# Patient Record
Sex: MALE | Race: OTHER | NOT HISPANIC OR LATINO | URBAN - METROPOLITAN AREA
[De-identification: names, ages, dates, MRNs, and addresses within clinical notes are randomized per-mention and may not be internally consistent; named-entity substitution may affect disease eponyms.]

---

## 2020-01-09 ENCOUNTER — INPATIENT (INPATIENT)
Facility: HOSPITAL | Age: 75
LOS: 11 days | Discharge: HOME | DRG: 870 | End: 2020-01-21
Attending: STUDENT IN AN ORGANIZED HEALTH CARE EDUCATION/TRAINING PROGRAM | Admitting: STUDENT IN AN ORGANIZED HEALTH CARE EDUCATION/TRAINING PROGRAM
Payer: SELF-PAY

## 2020-01-09 VITALS
SYSTOLIC BLOOD PRESSURE: 240 MMHG | TEMPERATURE: 97 F | OXYGEN SATURATION: 89 % | HEART RATE: 79 BPM | DIASTOLIC BLOOD PRESSURE: 140 MMHG | WEIGHT: 119.93 LBS | HEIGHT: 64 IN | RESPIRATION RATE: 36 BRPM

## 2020-01-09 DIAGNOSIS — R56.9 UNSPECIFIED CONVULSIONS: ICD-10-CM

## 2020-01-09 LAB
ALBUMIN SERPL ELPH-MCNC: 3.8 G/DL — SIGNIFICANT CHANGE UP (ref 3.4–5)
ALP SERPL-CCNC: 69 U/L — SIGNIFICANT CHANGE UP (ref 40–120)
ALT FLD-CCNC: 31 U/L — SIGNIFICANT CHANGE UP (ref 12–42)
ANION GAP SERPL CALC-SCNC: 17 MMOL/L — SIGNIFICANT CHANGE UP (ref 5–17)
ANION GAP SERPL CALC-SCNC: 17 MMOL/L — SIGNIFICANT CHANGE UP (ref 5–17)
ANION GAP SERPL CALC-SCNC: 6 MMOL/L — LOW (ref 9–16)
APPEARANCE UR: CLEAR — SIGNIFICANT CHANGE UP
APTT BLD: 32 SEC — SIGNIFICANT CHANGE UP (ref 27.5–36.3)
AST SERPL-CCNC: 55 U/L — HIGH (ref 15–37)
BACTERIA # UR AUTO: PRESENT /HPF
BASOPHILS # BLD AUTO: 0.03 K/UL — SIGNIFICANT CHANGE UP (ref 0–0.2)
BASOPHILS NFR BLD AUTO: 0.3 % — SIGNIFICANT CHANGE UP (ref 0–2)
BILIRUB SERPL-MCNC: 0.5 MG/DL — SIGNIFICANT CHANGE UP (ref 0.2–1.2)
BILIRUB UR-MCNC: NEGATIVE — SIGNIFICANT CHANGE UP
BUN SERPL-MCNC: 8 MG/DL — SIGNIFICANT CHANGE UP (ref 7–23)
BUN SERPL-MCNC: 9 MG/DL — SIGNIFICANT CHANGE UP (ref 7–23)
BUN SERPL-MCNC: 9 MG/DL — SIGNIFICANT CHANGE UP (ref 7–23)
CALCIUM SERPL-MCNC: 7.9 MG/DL — LOW (ref 8.4–10.5)
CALCIUM SERPL-MCNC: 8.2 MG/DL — LOW (ref 8.5–10.5)
CALCIUM SERPL-MCNC: 8.6 MG/DL — SIGNIFICANT CHANGE UP (ref 8.4–10.5)
CHLORIDE SERPL-SCNC: 91 MMOL/L — LOW (ref 96–108)
CHLORIDE SERPL-SCNC: 92 MMOL/L — LOW (ref 96–108)
CHLORIDE SERPL-SCNC: 93 MMOL/L — LOW (ref 96–108)
CK MB BLD-MCNC: 2.69 % — SIGNIFICANT CHANGE UP
CK MB CFR SERPL CALC: 8.8 NG/ML — HIGH (ref 0.5–3.6)
CK SERPL-CCNC: 327 U/L — HIGH (ref 39–308)
CO2 SERPL-SCNC: 19 MMOL/L — LOW (ref 22–31)
CO2 SERPL-SCNC: 20 MMOL/L — LOW (ref 22–31)
CO2 SERPL-SCNC: 29 MMOL/L — SIGNIFICANT CHANGE UP (ref 22–31)
COLOR SPEC: YELLOW — SIGNIFICANT CHANGE UP
CREAT SERPL-MCNC: 0.6 MG/DL — SIGNIFICANT CHANGE UP (ref 0.5–1.3)
CREAT SERPL-MCNC: 0.76 MG/DL — SIGNIFICANT CHANGE UP (ref 0.5–1.3)
CREAT SERPL-MCNC: 0.81 MG/DL — SIGNIFICANT CHANGE UP (ref 0.5–1.3)
DIFF PNL FLD: ABNORMAL
EOSINOPHIL # BLD AUTO: 0 K/UL — SIGNIFICANT CHANGE UP (ref 0–0.5)
EOSINOPHIL NFR BLD AUTO: 0 % — SIGNIFICANT CHANGE UP (ref 0–6)
FLU A RESULT: SIGNIFICANT CHANGE UP
FLU A RESULT: SIGNIFICANT CHANGE UP
FLUAV AG NPH QL: SIGNIFICANT CHANGE UP
FLUBV AG NPH QL: SIGNIFICANT CHANGE UP
GAS PNL BLDA: SIGNIFICANT CHANGE UP
GAS PNL BLDV: SIGNIFICANT CHANGE UP
GLUCOSE BLDC GLUCOMTR-MCNC: 198 MG/DL — HIGH (ref 70–99)
GLUCOSE SERPL-MCNC: 127 MG/DL — HIGH (ref 70–99)
GLUCOSE SERPL-MCNC: 198 MG/DL — HIGH (ref 70–99)
GLUCOSE SERPL-MCNC: 201 MG/DL — HIGH (ref 70–99)
GLUCOSE UR QL: 500
HCT VFR BLD CALC: 45.8 % — SIGNIFICANT CHANGE UP (ref 39–50)
HGB BLD-MCNC: 15 G/DL — SIGNIFICANT CHANGE UP (ref 13–17)
IMM GRANULOCYTES NFR BLD AUTO: 0.3 % — SIGNIFICANT CHANGE UP (ref 0–1.5)
INR BLD: 1.16 — SIGNIFICANT CHANGE UP (ref 0.88–1.16)
KETONES UR-MCNC: NEGATIVE — SIGNIFICANT CHANGE UP
LACTATE SERPL-SCNC: 1.6 MMOL/L — SIGNIFICANT CHANGE UP (ref 0.4–2)
LACTATE SERPL-SCNC: 5.6 MMOL/L — CRITICAL HIGH (ref 0.5–2)
LEUKOCYTE ESTERASE UR-ACNC: NEGATIVE — SIGNIFICANT CHANGE UP
LYMPHOCYTES # BLD AUTO: 0.58 K/UL — LOW (ref 1–3.3)
LYMPHOCYTES # BLD AUTO: 5.7 % — LOW (ref 13–44)
MCHC RBC-ENTMCNC: 29.8 PG — SIGNIFICANT CHANGE UP (ref 27–34)
MCHC RBC-ENTMCNC: 32.8 GM/DL — SIGNIFICANT CHANGE UP (ref 32–36)
MCV RBC AUTO: 91.1 FL — SIGNIFICANT CHANGE UP (ref 80–100)
MONOCYTES # BLD AUTO: 1.03 K/UL — HIGH (ref 0–0.9)
MONOCYTES NFR BLD AUTO: 10.1 % — SIGNIFICANT CHANGE UP (ref 2–14)
NEUTROPHILS # BLD AUTO: 8.5 K/UL — HIGH (ref 1.8–7.4)
NEUTROPHILS NFR BLD AUTO: 83.6 % — HIGH (ref 43–77)
NITRITE UR-MCNC: NEGATIVE — SIGNIFICANT CHANGE UP
NRBC # BLD: 0 /100 WBCS — SIGNIFICANT CHANGE UP (ref 0–0)
NT-PROBNP SERPL-SCNC: 486 PG/ML — HIGH
PCO2 BLDA: 73 MMHG — CRITICAL HIGH (ref 35–48)
PH BLDA: 7.26 — LOW (ref 7.35–7.45)
PH UR: 6.5 — SIGNIFICANT CHANGE UP (ref 5–8)
PLATELET # BLD AUTO: 171 K/UL — SIGNIFICANT CHANGE UP (ref 150–400)
PO2 BLDA: 269 MMHG — HIGH (ref 83–108)
POTASSIUM SERPL-MCNC: 3.9 MMOL/L — SIGNIFICANT CHANGE UP (ref 3.5–5.3)
POTASSIUM SERPL-MCNC: 5 MMOL/L — SIGNIFICANT CHANGE UP (ref 3.5–5.3)
POTASSIUM SERPL-MCNC: SIGNIFICANT CHANGE UP MMOL/L (ref 3.5–5.3)
POTASSIUM SERPL-SCNC: 3.9 MMOL/L — SIGNIFICANT CHANGE UP (ref 3.5–5.3)
POTASSIUM SERPL-SCNC: 5 MMOL/L — SIGNIFICANT CHANGE UP (ref 3.5–5.3)
POTASSIUM SERPL-SCNC: SIGNIFICANT CHANGE UP MMOL/L (ref 3.5–5.3)
PROT SERPL-MCNC: 8.4 G/DL — HIGH (ref 6.4–8.2)
PROT UR-MCNC: 30 MG/DL
PROTHROM AB SERPL-ACNC: 12.8 SEC — SIGNIFICANT CHANGE UP (ref 10–12.9)
RBC # BLD: 5.03 M/UL — SIGNIFICANT CHANGE UP (ref 4.2–5.8)
RBC # FLD: 13.6 % — SIGNIFICANT CHANGE UP (ref 10.3–14.5)
RBC CASTS # UR COMP ASSIST: > 10 /HPF
RSV RESULT: DETECTED
RSV RNA RESP QL NAA+PROBE: DETECTED
SAO2 % BLDA: 99 % — SIGNIFICANT CHANGE UP (ref 95–100)
SODIUM SERPL-SCNC: 126 MMOL/L — LOW (ref 132–145)
SODIUM SERPL-SCNC: 128 MMOL/L — LOW (ref 135–145)
SODIUM SERPL-SCNC: 130 MMOL/L — LOW (ref 135–145)
SP GR SPEC: 1.02 — SIGNIFICANT CHANGE UP (ref 1–1.03)
TROPONIN I SERPL-MCNC: <0.017 NG/ML — LOW (ref 0.02–0.06)
UROBILINOGEN FLD QL: 0.2 E.U./DL — SIGNIFICANT CHANGE UP
WBC # BLD: 10.17 K/UL — SIGNIFICANT CHANGE UP (ref 3.8–10.5)
WBC # FLD AUTO: 10.17 K/UL — SIGNIFICANT CHANGE UP (ref 3.8–10.5)
WBC UR QL: < 5 /HPF — SIGNIFICANT CHANGE UP

## 2020-01-09 PROCEDURE — 99291 CRITICAL CARE FIRST HOUR: CPT | Mod: 25

## 2020-01-09 PROCEDURE — 71045 X-RAY EXAM CHEST 1 VIEW: CPT | Mod: 26,77

## 2020-01-09 PROCEDURE — 93010 ELECTROCARDIOGRAM REPORT: CPT | Mod: 59

## 2020-01-09 PROCEDURE — 71045 X-RAY EXAM CHEST 1 VIEW: CPT | Mod: 26

## 2020-01-09 PROCEDURE — 99291 CRITICAL CARE FIRST HOUR: CPT

## 2020-01-09 PROCEDURE — 99292 CRITICAL CARE ADDL 30 MIN: CPT | Mod: 25

## 2020-01-09 PROCEDURE — 31500 INSERT EMERGENCY AIRWAY: CPT

## 2020-01-09 RX ORDER — PROPOFOL 10 MG/ML
50 INJECTION, EMULSION INTRAVENOUS ONCE
Refills: 0 | Status: COMPLETED | OUTPATIENT
Start: 2020-01-09 | End: 2020-01-09

## 2020-01-09 RX ORDER — PROPOFOL 10 MG/ML
5 INJECTION, EMULSION INTRAVENOUS
Qty: 1000 | Refills: 0 | Status: DISCONTINUED | OUTPATIENT
Start: 2020-01-09 | End: 2020-01-10

## 2020-01-09 RX ORDER — SODIUM CHLORIDE 9 MG/ML
1700 INJECTION INTRAMUSCULAR; INTRAVENOUS; SUBCUTANEOUS ONCE
Refills: 0 | Status: COMPLETED | OUTPATIENT
Start: 2020-01-09 | End: 2020-01-09

## 2020-01-09 RX ORDER — CHLORHEXIDINE GLUCONATE 213 G/1000ML
15 SOLUTION TOPICAL EVERY 12 HOURS
Refills: 0 | Status: DISCONTINUED | OUTPATIENT
Start: 2020-01-09 | End: 2020-01-12

## 2020-01-09 RX ORDER — SUCCINYLCHOLINE CHLORIDE 100 MG/5ML
80 SYRINGE (ML) INTRAVENOUS ONCE
Refills: 0 | Status: COMPLETED | OUTPATIENT
Start: 2020-01-09 | End: 2020-01-09

## 2020-01-09 RX ORDER — IPRATROPIUM/ALBUTEROL SULFATE 18-103MCG
3 AEROSOL WITH ADAPTER (GRAM) INHALATION EVERY 4 HOURS
Refills: 0 | Status: DISCONTINUED | OUTPATIENT
Start: 2020-01-09 | End: 2020-01-21

## 2020-01-09 RX ORDER — SODIUM CHLORIDE 9 MG/ML
1000 INJECTION INTRAMUSCULAR; INTRAVENOUS; SUBCUTANEOUS
Refills: 0 | Status: DISCONTINUED | OUTPATIENT
Start: 2020-01-09 | End: 2020-01-10

## 2020-01-09 RX ORDER — CHLORHEXIDINE GLUCONATE 213 G/1000ML
1 SOLUTION TOPICAL
Refills: 0 | Status: DISCONTINUED | OUTPATIENT
Start: 2020-01-09 | End: 2020-01-09

## 2020-01-09 RX ORDER — ETOMIDATE 2 MG/ML
20 INJECTION INTRAVENOUS ONCE
Refills: 0 | Status: COMPLETED | OUTPATIENT
Start: 2020-01-09 | End: 2020-01-09

## 2020-01-09 RX ORDER — SODIUM CHLORIDE 9 MG/ML
1000 INJECTION, SOLUTION INTRAVENOUS
Refills: 0 | Status: DISCONTINUED | OUTPATIENT
Start: 2020-01-09 | End: 2020-01-10

## 2020-01-09 RX ORDER — MIDAZOLAM HYDROCHLORIDE 1 MG/ML
0.02 INJECTION, SOLUTION INTRAMUSCULAR; INTRAVENOUS
Qty: 100 | Refills: 0 | Status: DISCONTINUED | OUTPATIENT
Start: 2020-01-09 | End: 2020-01-10

## 2020-01-09 RX ORDER — HEPARIN SODIUM 5000 [USP'U]/ML
5000 INJECTION INTRAVENOUS; SUBCUTANEOUS EVERY 12 HOURS
Refills: 0 | Status: DISCONTINUED | OUTPATIENT
Start: 2020-01-09 | End: 2020-01-10

## 2020-01-09 RX ORDER — FENTANYL CITRATE 50 UG/ML
50 INJECTION INTRAVENOUS ONCE
Refills: 0 | Status: DISCONTINUED | OUTPATIENT
Start: 2020-01-09 | End: 2020-01-09

## 2020-01-09 RX ORDER — CHLORHEXIDINE GLUCONATE 213 G/1000ML
1 SOLUTION TOPICAL DAILY
Refills: 0 | Status: DISCONTINUED | OUTPATIENT
Start: 2020-01-09 | End: 2020-01-16

## 2020-01-09 RX ORDER — MIDAZOLAM HYDROCHLORIDE 1 MG/ML
2 INJECTION, SOLUTION INTRAMUSCULAR; INTRAVENOUS ONCE
Refills: 0 | Status: DISCONTINUED | OUTPATIENT
Start: 2020-01-09 | End: 2020-01-09

## 2020-01-09 RX ORDER — FENTANYL CITRATE 50 UG/ML
1 INJECTION INTRAVENOUS
Qty: 2500 | Refills: 0 | Status: DISCONTINUED | OUTPATIENT
Start: 2020-01-09 | End: 2020-01-09

## 2020-01-09 RX ORDER — CEFTRIAXONE 500 MG/1
1000 INJECTION, POWDER, FOR SOLUTION INTRAMUSCULAR; INTRAVENOUS ONCE
Refills: 0 | Status: COMPLETED | OUTPATIENT
Start: 2020-01-09 | End: 2020-01-09

## 2020-01-09 RX ORDER — AZITHROMYCIN 500 MG/1
500 TABLET, FILM COATED ORAL ONCE
Refills: 0 | Status: COMPLETED | OUTPATIENT
Start: 2020-01-09 | End: 2020-01-09

## 2020-01-09 RX ORDER — MAGNESIUM SULFATE 500 MG/ML
2 VIAL (ML) INJECTION ONCE
Refills: 0 | Status: COMPLETED | OUTPATIENT
Start: 2020-01-09 | End: 2020-01-09

## 2020-01-09 RX ORDER — IPRATROPIUM/ALBUTEROL SULFATE 18-103MCG
3 AEROSOL WITH ADAPTER (GRAM) INHALATION
Refills: 0 | Status: COMPLETED | OUTPATIENT
Start: 2020-01-09 | End: 2020-01-09

## 2020-01-09 RX ORDER — AZITHROMYCIN 500 MG/1
500 TABLET, FILM COATED ORAL EVERY 24 HOURS
Refills: 0 | Status: DISCONTINUED | OUTPATIENT
Start: 2020-01-10 | End: 2020-01-14

## 2020-01-09 RX ADMIN — AZITHROMYCIN 255 MILLIGRAM(S): 500 TABLET, FILM COATED ORAL at 13:39

## 2020-01-09 RX ADMIN — PROPOFOL 50 MILLIGRAM(S): 10 INJECTION, EMULSION INTRAVENOUS at 13:30

## 2020-01-09 RX ADMIN — Medication 80 MILLIGRAM(S): at 13:01

## 2020-01-09 RX ADMIN — CEFTRIAXONE 100 MILLIGRAM(S): 500 INJECTION, POWDER, FOR SOLUTION INTRAMUSCULAR; INTRAVENOUS at 12:41

## 2020-01-09 RX ADMIN — PROPOFOL 50 MILLIGRAM(S): 10 INJECTION, EMULSION INTRAVENOUS at 13:13

## 2020-01-09 RX ADMIN — Medication 3 MILLILITER(S): at 12:40

## 2020-01-09 RX ADMIN — FENTANYL CITRATE 5.44 MICROGRAM(S)/KG/HR: 50 INJECTION INTRAVENOUS at 13:56

## 2020-01-09 RX ADMIN — MIDAZOLAM HYDROCHLORIDE 2 MILLIGRAM(S): 1 INJECTION, SOLUTION INTRAMUSCULAR; INTRAVENOUS at 21:32

## 2020-01-09 RX ADMIN — MIDAZOLAM HYDROCHLORIDE 1 MG/KG/HR: 1 INJECTION, SOLUTION INTRAMUSCULAR; INTRAVENOUS at 23:21

## 2020-01-09 RX ADMIN — Medication 50 GRAM(S): at 13:39

## 2020-01-09 RX ADMIN — FENTANYL CITRATE 50 MICROGRAM(S): 50 INJECTION INTRAVENOUS at 13:06

## 2020-01-09 RX ADMIN — SODIUM CHLORIDE 1700 MILLILITER(S): 9 INJECTION INTRAMUSCULAR; INTRAVENOUS; SUBCUTANEOUS at 12:40

## 2020-01-09 RX ADMIN — PROPOFOL 1.63 MICROGRAM(S)/KG/MIN: 10 INJECTION, EMULSION INTRAVENOUS at 18:40

## 2020-01-09 RX ADMIN — Medication 3 MILLILITER(S): at 12:59

## 2020-01-09 RX ADMIN — Medication 125 MILLIGRAM(S): at 12:51

## 2020-01-09 RX ADMIN — Medication 3 MILLILITER(S): at 20:25

## 2020-01-09 RX ADMIN — PROPOFOL 50 MILLIGRAM(S): 10 INJECTION, EMULSION INTRAVENOUS at 15:30

## 2020-01-09 RX ADMIN — SODIUM CHLORIDE 80 MILLILITER(S): 9 INJECTION INTRAMUSCULAR; INTRAVENOUS; SUBCUTANEOUS at 18:40

## 2020-01-09 RX ADMIN — PROPOFOL 1.63 MICROGRAM(S)/KG/MIN: 10 INJECTION, EMULSION INTRAVENOUS at 13:57

## 2020-01-09 RX ADMIN — Medication 3 MILLILITER(S): at 12:50

## 2020-01-09 RX ADMIN — PROPOFOL 50 MILLIGRAM(S): 10 INJECTION, EMULSION INTRAVENOUS at 15:50

## 2020-01-09 RX ADMIN — ETOMIDATE 20 MILLIGRAM(S): 2 INJECTION INTRAVENOUS at 13:00

## 2020-01-09 RX ADMIN — SODIUM CHLORIDE 2000 MILLILITER(S): 9 INJECTION, SOLUTION INTRAVENOUS at 22:45

## 2020-01-09 NOTE — ED PROVIDER NOTE - IMAGING STUDIES QUESTION 2 - PERFORMED INDEPENDENT VISUALIZATION
UTI precautions:  Wipe front to back and avoid constipation.  Avoid caffeine.  Drink 1 gallon of water daily  Void every 3-4 hrs  Expel urine from vagina post void  Void soon after urge arises  No dryer sheets or harsh detergents with the undergarments  No bubble baths  Void before and after intercourse  Avoid hot tub use  Avoid tight fitting clothes and panty hose  Cranberry supplement w/ 36 mg of PAC-helps to block bacteria from attaching to the bacteria wall  Probiotic- [employer] Ultra 25 Billion CFU Probiotic Complex, Multi Strain.  The Multi Strain is specifically the one that is important as the greater variety of strains is better.     Yes

## 2020-01-09 NOTE — H&P ADULT - ATTENDING COMMENTS
Sedation increased  and I time decreased and RR decreased in order to increase E time with evidence of air trapping. continue steroids and bronchodilators.

## 2020-01-09 NOTE — ED PROVIDER NOTE - CLINICAL SUMMARY MEDICAL DECISION MAKING FREE TEXT BOX
hypercapnic respiratory failure due to RSV pneumonia, trial of BIPAP with improvement in hypoxia but not ventilation, requiring intubation for impending respiratory failure, labs noted with hyponatremia, mild, likely due to volume depletion/sepsis.  Given IV abx and sedatives in ED.  Accepted for admission to MICU DR CALDWELL.

## 2020-01-09 NOTE — ED ADULT NURSE NOTE - OBJECTIVE STATEMENT
73 yo M, no pertinent PMHx, no allergies, no home medications BIBEMS c/o SOB, wheezing, difficulty breathing in the setting of flu-like symptoms x 5 days. Pt c/o cough, body aches. Per wife pt is not on anti-coagulants, beta-blockers, anti-hypertensive drugs. Pt smokes marijuana regularly. Pt states he has not seen a PMD in years. Pt's wife admits that he used a vaporizer for marijuana once recently, but has been using a vicks vaporizer since onset of symptoms.

## 2020-01-09 NOTE — ED PROVIDER NOTE - CRITICAL CARE PROVIDED
consult w/ pt's family directly relating to pts condition/consultation with other physicians/conducted a detailed discussion of DNR status/direct patient care (not related to procedure)/documentation/additional history taking

## 2020-01-09 NOTE — PATIENT PROFILE ADULT - FUNCTIONAL SCREEN CURRENT LEVEL: EATING, MLM
Eye Protection Verbiage: Before proceeding with the stage, a plastic scleral shield was inserted. The globe was anesthetized with a few drops of 1% lidocaine with 1:100,000 epinephrine. Then, an appropriate sized scleral shield was chosen and coated with lacrilube ointment. The shield was gently inserted and left in place for the duration of each stage. After the stage was completed, the shield was gently removed. 4 = completely dependent

## 2020-01-09 NOTE — H&P ADULT - HISTORY OF PRESENT ILLNESS
75 yo M, no pertinent PMHx, no allergies, no home medications BIBEMS c/o SOB, wheezing, difficulty breathing in the setting of flu-like symptoms x 5 days. Pt c/o cough, body aches. Per wife pt is not on anti-coagulants, beta-blockers, anti-hypertensive drugs. Pt smokes marijuana regularly. Pt states he has not seen a PMD in years. Pt's wife admits that he used a vaporizer for marijuana once recently, but has been using a vicks vaporizer since onset of symptoms. Patient intubated and admitted to the MICU for Hypercapnic respiratory failure due to RSV pneumonia, s/p trial of BIPAP with improvement in hypoxia but not ventilation, requiring intubation for impending respiratory failure, labs noted with hyponatremia, mild, likely due to volume depletion/sepsis. Given IV abx and sedatives in ED.

## 2020-01-09 NOTE — ED ADULT NURSE REASSESSMENT NOTE - NS ED NURSE REASSESS COMMENT FT1
Patient breathing tachypneic and labored upon arrival.  Intubation started at 1300 - 20 Of Etomidate at 1300 and 20 of Succ at 1301.  Sedation s/p intubation with 50mg propofol at 1306 1315 and 1330.  Patient BP becoming controlled now that breathing is unlabored.

## 2020-01-09 NOTE — ED ADULT TRIAGE NOTE - CHIEF COMPLAINT QUOTE
Patient arrives via EMS notification complaining of shortness of breath/difficulty breathing, with no known past medical history; as per EMS, patient was found to be hypoxic in field, 89% on room air; patient arrives to ED hypertensive and in acute respiratory distress; patient clinically upgraded, provider at bedside to applied BiPap machine; patient is awake and alert, able to follow commands

## 2020-01-09 NOTE — H&P ADULT - ASSESSMENT
73 yo M, no pertinent PMHx, no allergies, no home medications BIBEMS for shortness of beath, found to be in hypercapnic respiratory failure 2/2 RSV in context of possible COPD and chronic smoking history.      PULMONARY:  #Acute hypercapnic respiratory failure 2/2 RSV  Patient presented SOB, wheezing, RSV+ in setting of possible COPD and chronic smoker.  s/p solumedrol 125mg, NO infiltrate seen on chest xray  - c/w solumedrol 20mg IVP q12  - c/w Azithromycin 500 for antiinflammatory affects  - c/w Duonebs  - c/w Titrate vent settings to ABG    #possible COPD  Chest xray demonstrated enlarged lungs along with extensive smoking history  - c/w manangment as above    NEURO:  #Intubated  Patient biting on ET tube  - c/w Propofol drip  - c/w Midazolam drip    HEMODYNAMICS:  #hypovolemic hyponatremia  -Na now 130  -NS @ 80cc/hr    ID  #RSV positive  - management as above    #metabolic acidosis  -with rising gap from 6-->17  -lactate 5.6    CARDIOVASCULAR: DARIO    RENAL: DARIO    GI/FEN: DARIO    DISPO - continue intensive level of care in CCM/MICU service    LINES: peripheral access, holliday    PROPHYLACTIC MEASURE  F: NS 80cc/hr  E: replete K>4, Mg>2  N: NPO  VTE Prophylaxis: heparin subq  C: full code  D: MICU

## 2020-01-09 NOTE — H&P ADULT - NSHPPHYSICALEXAM_GEN_ALL_CORE
VITAL SIGNS:  T(C): 37.4 (01-09-20 @ 22:39), Max: 37.4 (01-09-20 @ 22:39)  T(F): 99.4 (01-09-20 @ 22:39), Max: 99.4 (01-09-20 @ 22:39)  HR: 72 (01-10-20 @ 00:00) (52 - 80)  BP: 124/59 (01-10-20 @ 00:00) (109/58 - 240/140)  BP(mean): 85 (01-10-20 @ 00:00) (81 - 104)  RR: 14 (01-10-20 @ 00:00) (14 - 36)  SpO2: 99% (01-10-20 @ 00:00) (89% - 100%)  Wt(kg): --    PHYSICAL EXAM:    Constitutional: WDWN thin gentleman intubated and resting comfortably in bed; NAD  Head: NC/AT  Eyes: PERRL, EOMI, anicteric sclera  ENT: no nasal discharge; uvula midline, no oropharyngeal erythema or exudates; MMM  Neck: supple; no JVD or thyromegaly  Respiratory: decreased breath sounds B/L; no W/R/R, no retractions  Cardiac: +S1/S2; RRR; no M/R/G; PMI non-displaced  Gastrointestinal: abdomen soft, NT/ND; no rebound or guarding; +BSx4  Back: spine midline, no bony tenderness or step-offs; no CVAT B/L  Extremities: WWP, no clubbing or cyanosis; no peripheral edema  Musculoskeletal: unable to assess  Vascular: 2+ radial, femoral, DP/PT pulses B/L  Dermatologic: skin warm, dry and intact; no rashes, wounds, or scars  Lymphatic: no submandibular or cervical LAD  Neurologic: intubated

## 2020-01-10 LAB
ANION GAP SERPL CALC-SCNC: 12 MMOL/L — SIGNIFICANT CHANGE UP (ref 5–17)
ANION GAP SERPL CALC-SCNC: 13 MMOL/L — SIGNIFICANT CHANGE UP (ref 5–17)
BASE EXCESS BLDA CALC-SCNC: 0.9 MMOL/L — SIGNIFICANT CHANGE UP (ref -2–3)
BASOPHILS # BLD AUTO: 0.01 K/UL — SIGNIFICANT CHANGE UP (ref 0–0.2)
BASOPHILS NFR BLD AUTO: 0.1 % — SIGNIFICANT CHANGE UP (ref 0–2)
BUN SERPL-MCNC: 10 MG/DL — SIGNIFICANT CHANGE UP (ref 7–23)
BUN SERPL-MCNC: 8 MG/DL — SIGNIFICANT CHANGE UP (ref 7–23)
CALCIUM SERPL-MCNC: 8.1 MG/DL — LOW (ref 8.4–10.5)
CALCIUM SERPL-MCNC: 8.1 MG/DL — LOW (ref 8.4–10.5)
CHLORIDE SERPL-SCNC: 98 MMOL/L — SIGNIFICANT CHANGE UP (ref 96–108)
CHLORIDE SERPL-SCNC: 98 MMOL/L — SIGNIFICANT CHANGE UP (ref 96–108)
CO2 SERPL-SCNC: 24 MMOL/L — SIGNIFICANT CHANGE UP (ref 22–31)
CO2 SERPL-SCNC: 25 MMOL/L — SIGNIFICANT CHANGE UP (ref 22–31)
CREAT SERPL-MCNC: 0.83 MG/DL — SIGNIFICANT CHANGE UP (ref 0.5–1.3)
CREAT SERPL-MCNC: 0.97 MG/DL — SIGNIFICANT CHANGE UP (ref 0.5–1.3)
EOSINOPHIL # BLD AUTO: 0 K/UL — SIGNIFICANT CHANGE UP (ref 0–0.5)
EOSINOPHIL NFR BLD AUTO: 0 % — SIGNIFICANT CHANGE UP (ref 0–6)
GLUCOSE BLDC GLUCOMTR-MCNC: 112 MG/DL — HIGH (ref 70–99)
GLUCOSE BLDC GLUCOMTR-MCNC: 113 MG/DL — HIGH (ref 70–99)
GLUCOSE BLDC GLUCOMTR-MCNC: 135 MG/DL — HIGH (ref 70–99)
GLUCOSE BLDC GLUCOMTR-MCNC: 156 MG/DL — HIGH (ref 70–99)
GLUCOSE BLDC GLUCOMTR-MCNC: 161 MG/DL — HIGH (ref 70–99)
GLUCOSE SERPL-MCNC: 116 MG/DL — HIGH (ref 70–99)
GLUCOSE SERPL-MCNC: 146 MG/DL — HIGH (ref 70–99)
HCO3 BLDA-SCNC: 26 MMOL/L — SIGNIFICANT CHANGE UP (ref 21–28)
HCT VFR BLD CALC: 38.4 % — LOW (ref 39–50)
HGB BLD-MCNC: 12.6 G/DL — LOW (ref 13–17)
IMM GRANULOCYTES NFR BLD AUTO: 0.4 % — SIGNIFICANT CHANGE UP (ref 0–1.5)
LACTATE SERPL-SCNC: 2.1 MMOL/L — HIGH (ref 0.5–2)
LACTATE SERPL-SCNC: 3.4 MMOL/L — HIGH (ref 0.5–2)
LACTATE SERPL-SCNC: 5 MMOL/L — CRITICAL HIGH (ref 0.5–2)
LYMPHOCYTES # BLD AUTO: 0.82 K/UL — LOW (ref 1–3.3)
LYMPHOCYTES # BLD AUTO: 9.7 % — LOW (ref 13–44)
MAGNESIUM SERPL-MCNC: 2.4 MG/DL — SIGNIFICANT CHANGE UP (ref 1.6–2.6)
MCHC RBC-ENTMCNC: 30.1 PG — SIGNIFICANT CHANGE UP (ref 27–34)
MCHC RBC-ENTMCNC: 32.8 GM/DL — SIGNIFICANT CHANGE UP (ref 32–36)
MCV RBC AUTO: 91.6 FL — SIGNIFICANT CHANGE UP (ref 80–100)
MONOCYTES # BLD AUTO: 1.37 K/UL — HIGH (ref 0–0.9)
MONOCYTES NFR BLD AUTO: 16.2 % — HIGH (ref 2–14)
NEUTROPHILS # BLD AUTO: 6.21 K/UL — SIGNIFICANT CHANGE UP (ref 1.8–7.4)
NEUTROPHILS NFR BLD AUTO: 73.6 % — SIGNIFICANT CHANGE UP (ref 43–77)
NRBC # BLD: 0 /100 WBCS — SIGNIFICANT CHANGE UP (ref 0–0)
PCO2 BLDA: 44 MMHG — SIGNIFICANT CHANGE UP (ref 35–48)
PH BLDA: 7.39 — SIGNIFICANT CHANGE UP (ref 7.35–7.45)
PHOSPHATE SERPL-MCNC: 3.6 MG/DL — SIGNIFICANT CHANGE UP (ref 2.5–4.5)
PLATELET # BLD AUTO: 157 K/UL — SIGNIFICANT CHANGE UP (ref 150–400)
PO2 BLDA: 131 MMHG — HIGH (ref 83–108)
POTASSIUM SERPL-MCNC: 3.9 MMOL/L — SIGNIFICANT CHANGE UP (ref 3.5–5.3)
POTASSIUM SERPL-MCNC: 3.9 MMOL/L — SIGNIFICANT CHANGE UP (ref 3.5–5.3)
POTASSIUM SERPL-SCNC: 3.9 MMOL/L — SIGNIFICANT CHANGE UP (ref 3.5–5.3)
POTASSIUM SERPL-SCNC: 3.9 MMOL/L — SIGNIFICANT CHANGE UP (ref 3.5–5.3)
RBC # BLD: 4.19 M/UL — LOW (ref 4.2–5.8)
RBC # FLD: 13.9 % — SIGNIFICANT CHANGE UP (ref 10.3–14.5)
SAO2 % BLDA: 99 % — SIGNIFICANT CHANGE UP (ref 95–100)
SODIUM SERPL-SCNC: 134 MMOL/L — LOW (ref 135–145)
SODIUM SERPL-SCNC: 136 MMOL/L — SIGNIFICANT CHANGE UP (ref 135–145)
WBC # BLD: 8.44 K/UL — SIGNIFICANT CHANGE UP (ref 3.8–10.5)
WBC # FLD AUTO: 8.44 K/UL — SIGNIFICANT CHANGE UP (ref 3.8–10.5)

## 2020-01-10 PROCEDURE — 99291 CRITICAL CARE FIRST HOUR: CPT

## 2020-01-10 PROCEDURE — 93306 TTE W/DOPPLER COMPLETE: CPT | Mod: 26

## 2020-01-10 PROCEDURE — 71045 X-RAY EXAM CHEST 1 VIEW: CPT | Mod: 26

## 2020-01-10 RX ORDER — ACETAMINOPHEN 500 MG
650 TABLET ORAL EVERY 6 HOURS
Refills: 0 | Status: DISCONTINUED | OUTPATIENT
Start: 2020-01-10 | End: 2020-01-14

## 2020-01-10 RX ORDER — FENTANYL CITRATE 50 UG/ML
50 INJECTION INTRAVENOUS ONCE
Refills: 0 | Status: DISCONTINUED | OUTPATIENT
Start: 2020-01-10 | End: 2020-01-10

## 2020-01-10 RX ORDER — FENTANYL CITRATE 50 UG/ML
1.5 INJECTION INTRAVENOUS
Qty: 2500 | Refills: 0 | Status: DISCONTINUED | OUTPATIENT
Start: 2020-01-10 | End: 2020-01-13

## 2020-01-10 RX ORDER — SODIUM CHLORIDE 9 MG/ML
1000 INJECTION, SOLUTION INTRAVENOUS
Refills: 0 | Status: DISCONTINUED | OUTPATIENT
Start: 2020-01-10 | End: 2020-01-10

## 2020-01-10 RX ORDER — DEXTROSE 50 % IN WATER 50 %
12.5 SYRINGE (ML) INTRAVENOUS ONCE
Refills: 0 | Status: DISCONTINUED | OUTPATIENT
Start: 2020-01-10 | End: 2020-01-21

## 2020-01-10 RX ORDER — PROPOFOL 10 MG/ML
5 INJECTION, EMULSION INTRAVENOUS
Qty: 1000 | Refills: 0 | Status: DISCONTINUED | OUTPATIENT
Start: 2020-01-10 | End: 2020-01-13

## 2020-01-10 RX ORDER — DEXTROSE 50 % IN WATER 50 %
15 SYRINGE (ML) INTRAVENOUS ONCE
Refills: 0 | Status: DISCONTINUED | OUTPATIENT
Start: 2020-01-10 | End: 2020-01-21

## 2020-01-10 RX ORDER — INSULIN LISPRO 100/ML
VIAL (ML) SUBCUTANEOUS EVERY 6 HOURS
Refills: 0 | Status: DISCONTINUED | OUTPATIENT
Start: 2020-01-10 | End: 2020-01-21

## 2020-01-10 RX ORDER — GLUCAGON INJECTION, SOLUTION 0.5 MG/.1ML
1 INJECTION, SOLUTION SUBCUTANEOUS ONCE
Refills: 0 | Status: DISCONTINUED | OUTPATIENT
Start: 2020-01-10 | End: 2020-01-21

## 2020-01-10 RX ORDER — ENOXAPARIN SODIUM 100 MG/ML
30 INJECTION SUBCUTANEOUS EVERY 24 HOURS
Refills: 0 | Status: DISCONTINUED | OUTPATIENT
Start: 2020-01-10 | End: 2020-01-21

## 2020-01-10 RX ORDER — PANTOPRAZOLE SODIUM 20 MG/1
40 TABLET, DELAYED RELEASE ORAL DAILY
Refills: 0 | Status: DISCONTINUED | OUTPATIENT
Start: 2020-01-10 | End: 2020-01-14

## 2020-01-10 RX ORDER — SODIUM CHLORIDE 9 MG/ML
1000 INJECTION, SOLUTION INTRAVENOUS
Refills: 0 | Status: DISCONTINUED | OUTPATIENT
Start: 2020-01-10 | End: 2020-01-21

## 2020-01-10 RX ORDER — SODIUM CHLORIDE 9 MG/ML
1000 INJECTION, SOLUTION INTRAVENOUS
Refills: 0 | Status: DISCONTINUED | OUTPATIENT
Start: 2020-01-10 | End: 2020-01-13

## 2020-01-10 RX ORDER — DEXTROSE 50 % IN WATER 50 %
25 SYRINGE (ML) INTRAVENOUS ONCE
Refills: 0 | Status: DISCONTINUED | OUTPATIENT
Start: 2020-01-10 | End: 2020-01-21

## 2020-01-10 RX ORDER — NOREPINEPHRINE BITARTRATE/D5W 8 MG/250ML
0.05 PLASTIC BAG, INJECTION (ML) INTRAVENOUS
Qty: 8 | Refills: 0 | Status: DISCONTINUED | OUTPATIENT
Start: 2020-01-10 | End: 2020-01-13

## 2020-01-10 RX ADMIN — ENOXAPARIN SODIUM 30 MILLIGRAM(S): 100 INJECTION SUBCUTANEOUS at 17:46

## 2020-01-10 RX ADMIN — Medication 3 MILLILITER(S): at 10:41

## 2020-01-10 RX ADMIN — SODIUM CHLORIDE 100 MILLILITER(S): 9 INJECTION, SOLUTION INTRAVENOUS at 09:12

## 2020-01-10 RX ADMIN — Medication 20 MILLIGRAM(S): at 05:41

## 2020-01-10 RX ADMIN — FENTANYL CITRATE 50 MICROGRAM(S): 50 INJECTION INTRAVENOUS at 08:51

## 2020-01-10 RX ADMIN — Medication 2: at 12:48

## 2020-01-10 RX ADMIN — HEPARIN SODIUM 5000 UNIT(S): 5000 INJECTION INTRAVENOUS; SUBCUTANEOUS at 05:41

## 2020-01-10 RX ADMIN — CHLORHEXIDINE GLUCONATE 15 MILLILITER(S): 213 SOLUTION TOPICAL at 17:46

## 2020-01-10 RX ADMIN — Medication 40 MILLIGRAM(S): at 17:47

## 2020-01-10 RX ADMIN — CHLORHEXIDINE GLUCONATE 15 MILLILITER(S): 213 SOLUTION TOPICAL at 05:41

## 2020-01-10 RX ADMIN — PANTOPRAZOLE SODIUM 40 MILLIGRAM(S): 20 TABLET, DELAYED RELEASE ORAL at 12:48

## 2020-01-10 RX ADMIN — Medication 3 MILLILITER(S): at 17:20

## 2020-01-10 RX ADMIN — AZITHROMYCIN 255 MILLIGRAM(S): 500 TABLET, FILM COATED ORAL at 09:03

## 2020-01-10 RX ADMIN — CHLORHEXIDINE GLUCONATE 1 APPLICATION(S): 213 SOLUTION TOPICAL at 12:48

## 2020-01-10 RX ADMIN — SODIUM CHLORIDE 80 MILLILITER(S): 9 INJECTION INTRAMUSCULAR; INTRAVENOUS; SUBCUTANEOUS at 05:43

## 2020-01-10 RX ADMIN — FENTANYL CITRATE 7.5 MICROGRAM(S)/KG/HR: 50 INJECTION INTRAVENOUS at 15:48

## 2020-01-10 RX ADMIN — FENTANYL CITRATE 50 MICROGRAM(S): 50 INJECTION INTRAVENOUS at 12:38

## 2020-01-10 RX ADMIN — Medication 20 MILLIGRAM(S): at 09:03

## 2020-01-10 RX ADMIN — SODIUM CHLORIDE 50 MILLILITER(S): 9 INJECTION, SOLUTION INTRAVENOUS at 17:47

## 2020-01-10 RX ADMIN — FENTANYL CITRATE 50 MICROGRAM(S): 50 INJECTION INTRAVENOUS at 11:23

## 2020-01-10 RX ADMIN — PROPOFOL 1.63 MICROGRAM(S)/KG/MIN: 10 INJECTION, EMULSION INTRAVENOUS at 09:34

## 2020-01-10 RX ADMIN — Medication 3 MILLILITER(S): at 21:27

## 2020-01-10 RX ADMIN — FENTANYL CITRATE 50 MICROGRAM(S): 50 INJECTION INTRAVENOUS at 12:43

## 2020-01-10 RX ADMIN — Medication 3 MILLILITER(S): at 01:19

## 2020-01-10 RX ADMIN — FENTANYL CITRATE 50 MICROGRAM(S): 50 INJECTION INTRAVENOUS at 12:42

## 2020-01-10 RX ADMIN — Medication 3 MILLILITER(S): at 06:06

## 2020-01-10 RX ADMIN — Medication 3 MILLILITER(S): at 13:35

## 2020-01-10 RX ADMIN — PROPOFOL 1.63 MICROGRAM(S)/KG/MIN: 10 INJECTION, EMULSION INTRAVENOUS at 00:55

## 2020-01-10 NOTE — PROGRESS NOTE ADULT - ASSESSMENT
75 yo M, no pertinent PMHx, no allergies, no home medications BIBEMS for shortness of beath, found to be in hypercapnic respiratory failure 2/2 RSV in context of possible COPD and chronic smoking history.      PULMONARY:  #Acute hypercapnic respiratory failure 2/2 RSV  Patient presented SOB, wheezing, RSV+ in setting of possible COPD and chronic smoker.  s/p solumedrol 125mg, NO infiltrate seen on chest xray  - c/w solumedrol 40mg IVP q12  - c/w Azithromycin 500 for antiinflammatory affects  - c/w Duonebs  - c/w Titrate vent settings to ABG    #possible COPD  Chest xray demonstrated enlarged lungs along with extensive smoking history  - c/w manangment as above    NEURO:  #Intubated  Patient biting on ET tube  - c/w Propofol drip  - c/w fentanyl drip    HEMODYNAMICS:  #hypovolemic hyponatremia  -Na now 130  -LR @ 50cc/hr    ID:  #RSV positive  - management as above    #metabolic acidosis  -with rising gap from 6-->17  -lactate 5.6    CARDIOVASCULAR: DARIO    RENAL: DARIO    GI/FEN:  -Jevitty 1.2 @ 45ml/hr  -prostat    DISPO - continue intensive level of care in CCM/MICU service    LINES: peripheral access, holliday    PROPHYLACTIC MEASURE  F: NS 80cc/hr  E: replete K>4, Mg>2  N: NPO  VTE Prophylaxis: Lovenox 30mg  C: full code  D: MICU

## 2020-01-10 NOTE — DIETITIAN INITIAL EVALUATION ADULT. - ENTERAL
At current rate of propofol, recommend starting Jevity 1.2 Yohan @ 34mL/hr x 24hrs plus ProStat BID (200 kcal, 30g protein) via NGT. Provides: 816ml TV, 1443kcal, 75g pro, 659mL free H2O, 82% RDI, 1.5g/kg ABW protein, 22.9 npc/kg ABW. Recommend starting at 20mL/hr and advancing by [id number] to goal as tolerated. Additional free H2O per team. Monitor for s/s intolerance; maintain aspiration precautions at all times. Please follow VBF protocol.

## 2020-01-10 NOTE — DIETITIAN INITIAL EVALUATION ADULT. - ENERGY NEEDS
Height 64"; .2#; #; 85%IBW  BMI 18.9  ABW used to estimate needs as pt is <IBW. Needs adjusted for ventilator, RSV PNA

## 2020-01-10 NOTE — DIETITIAN INITIAL EVALUATION ADULT. - OTHER INFO
75 yo/male with no significant PMHx presented with SOB and wheezing. Patient intubated and admitted to the MICU for hypercapnic respiratory failure due to RSV pneumonia. Pt seen in room and discussed during MICU rounds. Pt intubated on VC/AC mode, sedated on propofol @ 10mL/hr (264kcal/day from lipids). Also sedated on versed, though plan to change this to fentanyl per team. MAP 78- not on pressors. NPO with plan to place dobhoff and start TF. Pt's wife at bedside, who endorsed that he is usually a very picky eater and has not had a great appetite as of late. He has lost ~10-15# over past few months. NFPE significant for moderate protein-calorie malnutrition. NKFA. Marvin 13, skin intact pressure-wise. Will continue to follow per RD protocol.

## 2020-01-10 NOTE — DIETITIAN INITIAL EVALUATION ADULT. - FACTORS AFF FOOD INTAKE
increased work of breathing/other (specify)/persistent lack of appetite/[place]az[place]ism/ethnic/cultural/personal food preferences

## 2020-01-10 NOTE — DIETITIAN INITIAL EVALUATION ADULT. - ADD RECOMMEND
1. Please see EN recs above *discussed w/team 2. Monitor lytes and replete prn. POC BG q6hrs 3. Pain and bowel regimens per team 4. Recommend MVI

## 2020-01-10 NOTE — PROGRESS NOTE ADULT - SUBJECTIVE AND OBJECTIVE BOX
INTERVAL HPI/OVERNIGHT EVENTS:    SUBJECTIVE: Patient seen and examined at bedside.     CONSTITUTIONAL: No weakness, fevers or chills  EYES/ENT: No visual changes;  No vertigo or throat pain   NECK: No pain or stiffness  RESPIRATORY: No cough, wheezing, hemoptysis; No shortness of breath  CARDIOVASCULAR: No chest pain or palpitations  GASTROINTESTINAL: No abdominal or epigastric pain. No nausea, vomiting, or hematemesis; No diarrhea or constipation. No melena or hematochezia.  GENITOURINARY: No dysuria, frequency or hematuria  NEUROLOGICAL: No numbness or weakness  SKIN: No itching, rashes    OBJECTIVE:    VITAL SIGNS:  ICU Vital Signs Last 24 Hrs  T(C): 37.2 (10 Nawaf 2020 01:02), Max: 37.4 (2020 22:39)  T(F): 98.9 (10 Nawaf 2020 01:02), Max: 99.4 (2020 22:39)  HR: 74 (10 Nawaf 2020 06:02) (52 - 80)  BP: 123/62 (10 Nawaf 2020 05:00) (109/58 - 240/140)  BP(mean): 92 (10 Nawaf 2020 05:00) (81 - 104)  ABP: --  ABP(mean): --  RR: 16 (10 Nawaf 2020 05:00) (14 - 36)  SpO2: 92% (10 Nawaf 2020 06:02) (89% - 100%)    Mode: AC/ CMV (Assist Control/ Continuous Mandatory Ventilation), RR (machine): 14, TV (machine): 500, FiO2: 30, PEEP: 5, ITime: 0.8, MAP: 13, PIP: 34     @ 07:01  -  10 @ 06:46  --------------------------------------------------------  IN: 2149 mL / OUT: 1550 mL / NET: 599 mL      CAPILLARY BLOOD GLUCOSE      POCT Blood Glucose.: 113 mg/dL (10 Nawaf 2020 06:19)      PHYSICAL EXAM:    General: NAD  HEENT: NC/AT; PERRL, clear conjunctiva  Neck: supple  Respiratory: CTA b/l  Cardiovascular: +S1/S2; RRR  Abdomen: soft, NT/ND; +BS x4  Extremities: WWP, 2+ peripheral pulses b/l; no LE edema  Skin: normal color and turgor; no rash  Neurological:    MEDICATIONS:  MEDICATIONS  (STANDING):  albuterol/ipratropium for Nebulization 3 milliLiter(s) Nebulizer every 4 hours  azithromycin  IVPB 500 milliGRAM(s) IV Intermittent every 24 hours  chlorhexidine 0.12% Liquid 15 milliLiter(s) Oral Mucosa every 12 hours  chlorhexidine 2% Cloths 1 Application(s) Topical daily  heparin  Injectable 5000 Unit(s) SubCutaneous every 12 hours  lactated ringers. 1000 milliLiter(s) (2000 mL/Hr) IV Continuous <Continuous>  methylPREDNISolone sodium succinate Injectable 20 milliGRAM(s) IV Push every 12 hours  midazolam Infusion 0.02 mG/kG/Hr (1 mL/Hr) IV Continuous <Continuous>  propofol Infusion 5 MICROgram(s)/kG/Min (1.632 mL/Hr) IV Continuous <Continuous>  sodium chloride 0.9%. 1000 milliLiter(s) (80 mL/Hr) IV Continuous <Continuous>    MEDICATIONS  (PRN):      ALLERGIES:  Allergies    No Known Allergies    Intolerances        LABS:                        15.0   10.17 )-----------( 171      ( 2020 12:42 )             45.8     01-09    130<L>  |  93<L>  |  9   ----------------------------<  127<H>  3.9   |  20<L>  |  0.76    Ca    8.6      2020 21:44    TPro  8.4<H>  /  Alb  3.8  /  TBili  0.5  /  DBili  x   /  AST  55<H>  /  ALT  31  /  AlkPhos  69  01-09    PT/INR - ( 2020 12:42 )   PT: 12.8 sec;   INR: 1.16          PTT - ( 2020 12:42 )  PTT:32.0 sec  Urinalysis Basic - ( 2020 13:13 )    Color: Yellow / Appearance: Clear / S.025 / pH: x  Gluc: x / Ketone: NEGATIVE  / Bili: NEGATIVE / Urobili: 0.2 E.U./dL   Blood: x / Protein: 30 mg/dL / Nitrite: NEGATIVE   Leuk Esterase: NEGATIVE / RBC: > 10 /HPF / WBC < 5 /HPF   Sq Epi: x / Non Sq Epi: x / Bacteria: Present /HPF        RADIOLOGY & ADDITIONAL TESTS: Reviewed. INTERVAL HPI/OVERNIGHT EVENTS: .Increased steroids to 40 q12.  Added fentanyl to sync with vent.  Subsequently hypotensive, started levophed.  Goal to improve ventilation, OK with using peripheral pressors.  Exchanged sump for dobhoff and started feeds.  ABG WNL.  Lactate resolved.  LR @ 50cc/hr.  TTE hyperdynamic with Grade I diastolic dysfunction. Jevity 1.2 @45ml/hr +1prostat and fluids at rate 50cc/hr of LR    SUBJECTIVE: Patient seen and examined at bedside.     OBJECTIVE:    VITAL SIGNS:  ICU Vital Signs Last 24 Hrs  T(C): 37.2 (10 Nawaf 2020 01:02), Max: 37.4 (2020 22:39)  T(F): 98.9 (10 Nawaf 2020 01:02), Max: 99.4 (2020 22:39)  HR: 74 (10 Nawaf 2020 06:02) (52 - 80)  BP: 123/62 (10 Nawaf 2020 05:00) (109/58 - 240/140)  BP(mean): 92 (10 Nawaf 2020 05:00) (81 - 104)  ABP: --  ABP(mean): --  RR: 16 (10 Nawaf 2020 05:00) (14 - 36)  SpO2: 92% (10 Nawaf 2020 06:02) (89% - 100%)    Mode: AC/ CMV (Assist Control/ Continuous Mandatory Ventilation), RR (machine): 14, TV (machine): 500, FiO2: 30, PEEP: 5, ITime: 0.8, MAP: 13, PIP: 34     @ 07:01  -  -10 @ 06:46  --------------------------------------------------------  IN: 2149 mL / OUT: 1550 mL / NET: 599 mL      CAPILLARY BLOOD GLUCOSE      POCT Blood Glucose.: 113 mg/dL (10 Nawaf 2020 06:19)      PHYSICAL EXAM:    	Constitutional: WDWN thin gentleman intubated and resting comfortably in bed; NAD  	Head: NC/AT  	Eyes: PERRL, EOMI, anicteric sclera  	ENT: no nasal discharge; uvula midline, no oropharyngeal erythema or exudates; MMM  	Neck: supple; no JVD or thyromegaly  	Respiratory: decreased breath sounds B/L; no W/R/R, no retractions  	Cardiac: +S1/S2; RRR; no M/R/G; PMI non-displaced  	Gastrointestinal: abdomen soft, NT/ND; no rebound or guarding; +BSx4  	Back: spine midline, no bony tenderness or step-offs; no CVAT B/L  	Extremities: WWP, no clubbing or cyanosis; no peripheral edema  	Musculoskeletal: unable to assess  	Vascular: 2+ radial, femoral, DP/PT pulses B/L  	Dermatologic: skin warm, dry and intact; no rashes, wounds, or scars  	Lymphatic: no submandibular or cervical LAD  Neurologic:     MEDICATIONS:  MEDICATIONS  (STANDING):  albuterol/ipratropium for Nebulization 3 milliLiter(s) Nebulizer every 4 hours  azithromycin  IVPB 500 milliGRAM(s) IV Intermittent every 24 hours  chlorhexidine 0.12% Liquid 15 milliLiter(s) Oral Mucosa every 12 hours  chlorhexidine 2% Cloths 1 Application(s) Topical daily  heparin  Injectable 5000 Unit(s) SubCutaneous every 12 hours  lactated ringers. 1000 milliLiter(s) (2000 mL/Hr) IV Continuous <Continuous>  methylPREDNISolone sodium succinate Injectable 20 milliGRAM(s) IV Push every 12 hours  midazolam Infusion 0.02 mG/kG/Hr (1 mL/Hr) IV Continuous <Continuous>  propofol Infusion 5 MICROgram(s)/kG/Min (1.632 mL/Hr) IV Continuous <Continuous>  sodium chloride 0.9%. 1000 milliLiter(s) (80 mL/Hr) IV Continuous <Continuous>    MEDICATIONS  (PRN):      ALLERGIES:  Allergies    No Known Allergies    Intolerances        LABS:                        15.0   10.17 )-----------( 171      ( 2020 12:42 )             45.8     01-09    130<L>  |  93<L>  |  9   ----------------------------<  127<H>  3.9   |  20<L>  |  0.76    Ca    8.6      2020 21:44    TPro  8.4<H>  /  Alb  3.8  /  TBili  0.5  /  DBili  x   /  AST  55<H>  /  ALT  31  /  AlkPhos  69  01-09    PT/INR - ( 2020 12:42 )   PT: 12.8 sec;   INR: 1.16          PTT - ( 2020 12:42 )  PTT:32.0 sec  Urinalysis Basic - ( 2020 13:13 )    Color: Yellow / Appearance: Clear / S.025 / pH: x  Gluc: x / Ketone: NEGATIVE  / Bili: NEGATIVE / Urobili: 0.2 E.U./dL   Blood: x / Protein: 30 mg/dL / Nitrite: NEGATIVE   Leuk Esterase: NEGATIVE / RBC: > 10 /HPF / WBC < 5 /HPF   Sq Epi: x / Non Sq Epi: x / Bacteria: Present /HPF        RADIOLOGY & ADDITIONAL TESTS: Reviewed.

## 2020-01-11 LAB
ANION GAP SERPL CALC-SCNC: 13 MMOL/L — SIGNIFICANT CHANGE UP (ref 5–17)
BUN SERPL-MCNC: 13 MG/DL — SIGNIFICANT CHANGE UP (ref 7–23)
CALCIUM SERPL-MCNC: 8.4 MG/DL — SIGNIFICANT CHANGE UP (ref 8.4–10.5)
CHLORIDE SERPL-SCNC: 100 MMOL/L — SIGNIFICANT CHANGE UP (ref 96–108)
CO2 SERPL-SCNC: 24 MMOL/L — SIGNIFICANT CHANGE UP (ref 22–31)
CREAT SERPL-MCNC: 0.87 MG/DL — SIGNIFICANT CHANGE UP (ref 0.5–1.3)
CULTURE RESULTS: NO GROWTH — SIGNIFICANT CHANGE UP
GLUCOSE BLDC GLUCOMTR-MCNC: 127 MG/DL — HIGH (ref 70–99)
GLUCOSE BLDC GLUCOMTR-MCNC: 133 MG/DL — HIGH (ref 70–99)
GLUCOSE BLDC GLUCOMTR-MCNC: 165 MG/DL — HIGH (ref 70–99)
GLUCOSE BLDC GLUCOMTR-MCNC: 88 MG/DL — SIGNIFICANT CHANGE UP (ref 70–99)
GLUCOSE SERPL-MCNC: 150 MG/DL — HIGH (ref 70–99)
HCT VFR BLD CALC: 37.9 % — LOW (ref 39–50)
HGB BLD-MCNC: 12.3 G/DL — LOW (ref 13–17)
MAGNESIUM SERPL-MCNC: 2.8 MG/DL — HIGH (ref 1.6–2.6)
MCHC RBC-ENTMCNC: 30.1 PG — SIGNIFICANT CHANGE UP (ref 27–34)
MCHC RBC-ENTMCNC: 32.5 GM/DL — SIGNIFICANT CHANGE UP (ref 32–36)
MCV RBC AUTO: 92.7 FL — SIGNIFICANT CHANGE UP (ref 80–100)
NRBC # BLD: 0 /100 WBCS — SIGNIFICANT CHANGE UP (ref 0–0)
PLATELET # BLD AUTO: 161 K/UL — SIGNIFICANT CHANGE UP (ref 150–400)
POTASSIUM SERPL-MCNC: 3.9 MMOL/L — SIGNIFICANT CHANGE UP (ref 3.5–5.3)
POTASSIUM SERPL-SCNC: 3.9 MMOL/L — SIGNIFICANT CHANGE UP (ref 3.5–5.3)
RBC # BLD: 4.09 M/UL — LOW (ref 4.2–5.8)
RBC # FLD: 14.4 % — SIGNIFICANT CHANGE UP (ref 10.3–14.5)
SODIUM SERPL-SCNC: 137 MMOL/L — SIGNIFICANT CHANGE UP (ref 135–145)
SPECIMEN SOURCE: SIGNIFICANT CHANGE UP
WBC # BLD: 9 K/UL — SIGNIFICANT CHANGE UP (ref 3.8–10.5)
WBC # FLD AUTO: 9 K/UL — SIGNIFICANT CHANGE UP (ref 3.8–10.5)

## 2020-01-11 PROCEDURE — 99291 CRITICAL CARE FIRST HOUR: CPT

## 2020-01-11 RX ORDER — POLYETHYLENE GLYCOL 3350 17 G/17G
17 POWDER, FOR SOLUTION ORAL DAILY
Refills: 0 | Status: DISCONTINUED | OUTPATIENT
Start: 2020-01-11 | End: 2020-01-16

## 2020-01-11 RX ADMIN — SODIUM CHLORIDE 50 MILLILITER(S): 9 INJECTION, SOLUTION INTRAVENOUS at 13:00

## 2020-01-11 RX ADMIN — FENTANYL CITRATE 7.5 MICROGRAM(S)/KG/HR: 50 INJECTION INTRAVENOUS at 22:23

## 2020-01-11 RX ADMIN — Medication 3 MILLILITER(S): at 09:31

## 2020-01-11 RX ADMIN — PROPOFOL 1.5 MICROGRAM(S)/KG/MIN: 10 INJECTION, EMULSION INTRAVENOUS at 13:00

## 2020-01-11 RX ADMIN — POLYETHYLENE GLYCOL 3350 17 GRAM(S): 17 POWDER, FOR SOLUTION ORAL at 17:41

## 2020-01-11 RX ADMIN — AZITHROMYCIN 255 MILLIGRAM(S): 500 TABLET, FILM COATED ORAL at 12:17

## 2020-01-11 RX ADMIN — Medication 2: at 12:16

## 2020-01-11 RX ADMIN — Medication 40 MILLIGRAM(S): at 07:03

## 2020-01-11 RX ADMIN — Medication 3 MILLILITER(S): at 21:12

## 2020-01-11 RX ADMIN — Medication 2: at 00:55

## 2020-01-11 RX ADMIN — PANTOPRAZOLE SODIUM 40 MILLIGRAM(S): 20 TABLET, DELAYED RELEASE ORAL at 12:16

## 2020-01-11 RX ADMIN — Medication 3 MILLILITER(S): at 06:09

## 2020-01-11 RX ADMIN — Medication 3 MILLILITER(S): at 17:56

## 2020-01-11 RX ADMIN — CHLORHEXIDINE GLUCONATE 1 APPLICATION(S): 213 SOLUTION TOPICAL at 12:08

## 2020-01-11 RX ADMIN — PROPOFOL 1.5 MICROGRAM(S)/KG/MIN: 10 INJECTION, EMULSION INTRAVENOUS at 19:54

## 2020-01-11 RX ADMIN — CHLORHEXIDINE GLUCONATE 15 MILLILITER(S): 213 SOLUTION TOPICAL at 07:03

## 2020-01-11 RX ADMIN — ENOXAPARIN SODIUM 30 MILLIGRAM(S): 100 INJECTION SUBCUTANEOUS at 17:41

## 2020-01-11 RX ADMIN — CHLORHEXIDINE GLUCONATE 15 MILLILITER(S): 213 SOLUTION TOPICAL at 17:41

## 2020-01-11 RX ADMIN — Medication 3 MILLILITER(S): at 14:08

## 2020-01-11 NOTE — PROGRESS NOTE ADULT - ASSESSMENT
73 yo M, no pertinent PMHx, no allergies, no home medications BIBEMS for shortness of beath, found to be in hypercapnic respiratory failure 2/2 RSV in context of possible COPD and chronic smoking history.      PULMONARY:  #Acute hypercapnic respiratory failure 2/2 RSV  Patient presented SOB, wheezing, RSV+ in setting of possible COPD and chronic smoker.  s/p solumedrol 125mg, NO infiltrate seen on chest xray  - c/w solumedrol 40mg IVP qd  - c/w Azithromycin 500 for antiinflammatory affects  - c/w Duonebs  - c/w Titrate vent settings to ABG    #possible COPD  Chest xray demonstrated enlarged lungs along with extensive smoking history  - c/w manangment as above    NEURO:  #Intubated  Patient biting on ET tube  - c/w Propofol drip  - c/w fentanyl drip  - c/w levophed for pressure support    HEMODYNAMICS:  #hypovolemic hyponatremia  -Na now 130  -LR @ 50cc/hr    ID:  #RSV positive  - management as above    #metabolic acidosis  -with rising gap from 6-->17  -lactate 5.6    CARDIOVASCULAR: DARIO    RENAL: DARIO    GI/FEN:  -Jevitty 1.2 @ 45ml/hr  -prostat    DISPO - continue intensive level of care in CCM/MICU service    LINES: peripheral access, holliday    PROPHYLACTIC MEASURE  F: NS 80cc/hr  E: replete K>4, Mg>2  N: NPO  VTE Prophylaxis: Lovenox 30mg  C: full code  D: MICU

## 2020-01-11 NOTE — PROGRESS NOTE ADULT - SUBJECTIVE AND OBJECTIVE BOX
INTERVAL HPI/OVERNIGHT EVENTS: stable, no acute events  1/11: Failed SBT.  Steroids decreased to qd. Goal to improve ventilation with propofol and fentanyl synergistically, Aida OK with using peripheral pressors to keep this sedation.     SUBJECTIVE: Patient seen and examined at bedside. Intubated unable to participate in interview.    OBJECTIVE:    VITAL SIGNS:  ICU Vital Signs Last 24 Hrs  T(C): 36.6 (11 Jan 2020 19:02), Max: 37.4 (11 Jan 2020 14:17)  T(F): 97.9 (11 Jan 2020 19:02), Max: 99.3 (11 Jan 2020 14:17)  HR: 49 (11 Jan 2020 20:23) (48 - 104)  BP: 106/54 (11 Jan 2020 20:00) (82/48 - 173/110)  BP(mean): 72 (11 Jan 2020 20:00) (58 - 150)  ABP: --  ABP(mean): --  RR: 26 (11 Jan 2020 20:00) (11 - 35)  SpO2: 100% (11 Jan 2020 20:23) (92% - 100%)    Mode: AC/ CMV (Assist Control/ Continuous Mandatory Ventilation), RR (machine): 12, TV (machine): 480, FiO2: 40, PEEP: 5, ITime: 0.8, MAP: 9.5, PIP: 32    01-10 @ 07:01 - 01-11 @ 07:00  --------------------------------------------------------  IN: 2395.5 mL / OUT: 1510 mL / NET: 885.5 mL    01-11 @ 07:01 - 01-11 @ 22:02  --------------------------------------------------------  IN: 1518 mL / OUT: 1280 mL / NET: 238 mL      CAPILLARY BLOOD GLUCOSE      POCT Blood Glucose.: 88 mg/dL (11 Jan 2020 16:09)      PHYSICAL EXAM:    Constitutional: WDWN thin gentleman intubated and resting comfortably in bed; NAD  	Head: NC/AT  	Eyes: PERRL, EOMI, anicteric sclera  	ENT: no nasal discharge; uvula midline, no oropharyngeal erythema or exudates; MMM  	Neck: supple; no JVD or thyromegaly  	Respiratory: decreased breath sounds B/L; no W/R/R, no retractions  	Cardiac: +S1/S2; RRR; no M/R/G; PMI non-displaced  	Gastrointestinal: abdomen soft, NT/ND; no rebound or guarding; +BSx4  	Back: spine midline, no bony tenderness or step-offs; no CVAT B/L  	Extremities: WWP, no clubbing or cyanosis; no peripheral edema  	Musculoskeletal: unable to assess  	Vascular: 2+ radial, femoral, DP/PT pulses B/L  	Dermatologic: skin warm, dry and intact; no rashes, wounds, or scars  Lymphatic: no submandibular or cervical LAD  Neurologic: Patient     MEDICATIONS:  MEDICATIONS  (STANDING):  albuterol/ipratropium for Nebulization 3 milliLiter(s) Nebulizer every 4 hours  azithromycin  IVPB 500 milliGRAM(s) IV Intermittent every 24 hours  chlorhexidine 0.12% Liquid 15 milliLiter(s) Oral Mucosa every 12 hours  chlorhexidine 2% Cloths 1 Application(s) Topical daily  dextrose 5%. 1000 milliLiter(s) (50 mL/Hr) IV Continuous <Continuous>  dextrose 50% Injectable 12.5 Gram(s) IV Push once  dextrose 50% Injectable 25 Gram(s) IV Push once  dextrose 50% Injectable 25 Gram(s) IV Push once  enoxaparin Injectable 30 milliGRAM(s) SubCutaneous every 24 hours  fentaNYL   Infusion. 1.5 MICROgram(s)/kG/Hr (7.5 mL/Hr) IV Continuous <Continuous>  insulin lispro (HumaLOG) corrective regimen sliding scale   SubCutaneous every 6 hours  lactated ringers. 1000 milliLiter(s) (50 mL/Hr) IV Continuous <Continuous>  norepinephrine Infusion 0.05 MICROgram(s)/kG/Min (4.688 mL/Hr) IV Continuous <Continuous>  pantoprazole  Injectable 40 milliGRAM(s) IV Push daily  polyethylene glycol 3350 17 Gram(s) Oral daily  propofol Infusion 5 MICROgram(s)/kG/Min (1.5 mL/Hr) IV Continuous <Continuous>    MEDICATIONS  (PRN):  acetaminophen  Suppository .. 650 milliGRAM(s) Rectal every 6 hours PRN Temp greater or equal to 38C (100.4F)  dextrose 40% Gel 15 Gram(s) Oral once PRN Blood Glucose LESS THAN 70 milliGRAM(s)/deciliter  glucagon  Injectable 1 milliGRAM(s) IntraMuscular once PRN Glucose LESS THAN 70 milligrams/deciliter      ALLERGIES:  Allergies    No Known Allergies    Intolerances        LABS:                        12.3   9.00  )-----------( 161      ( 11 Jan 2020 07:00 )             37.9     01-11    137  |  100  |  13  ----------------------------<  150<H>  3.9   |  24  |  0.87    Ca    8.4      11 Jan 2020 07:00  Phos  3.6     01-10  Mg     2.8     01-11            RADIOLOGY & ADDITIONAL TESTS: Reviewed. INTERVAL HPI/OVERNIGHT EVENTS: stable, no acute events  1/11: Failed SBT.  Steroids decreased to qd. Goal to improve ventilation with propofol and fentanyl synergistically, Aida OK with using peripheral pressors to keep this sedation.     SUBJECTIVE: Patient seen and examined at bedside. Intubated unable to participate in interview.    OBJECTIVE:    VITAL SIGNS:  ICU Vital Signs Last 24 Hrs  T(C): 36.6 (11 Jan 2020 19:02), Max: 37.4 (11 Jan 2020 14:17)  T(F): 97.9 (11 Jan 2020 19:02), Max: 99.3 (11 Jan 2020 14:17)  HR: 49 (11 Jan 2020 20:23) (48 - 104)  BP: 106/54 (11 Jan 2020 20:00) (82/48 - 173/110)  BP(mean): 72 (11 Jan 2020 20:00) (58 - 150)  ABP: --  ABP(mean): --  RR: 26 (11 Jan 2020 20:00) (11 - 35)  SpO2: 100% (11 Jan 2020 20:23) (92% - 100%)    Mode: AC/ CMV (Assist Control/ Continuous Mandatory Ventilation), RR (machine): 12, TV (machine): 480, FiO2: 40, PEEP: 5, ITime: 0.8, MAP: 9.5, PIP: 32    01-10 @ 07:01 - 01-11 @ 07:00  --------------------------------------------------------  IN: 2395.5 mL / OUT: 1510 mL / NET: 885.5 mL    01-11 @ 07:01 - 01-11 @ 22:02  --------------------------------------------------------  IN: 1518 mL / OUT: 1280 mL / NET: 238 mL      CAPILLARY BLOOD GLUCOSE      POCT Blood Glucose.: 88 mg/dL (11 Jan 2020 16:09)      PHYSICAL EXAM:    Constitutional: WDWN thin gentleman intubated and resting comfortably in bed; NAD  	Head: NC/AT  	Eyes: PERRL, EOMI, anicteric sclera  	ENT: no nasal discharge; uvula midline, no oropharyngeal erythema or exudates; MMM  	Neck: supple; no JVD or thyromegaly  	Respiratory: decreased breath sounds B/L; no W/R/R, no retractions  	Cardiac: +S1/S2; RRR; no M/R/G; PMI non-displaced  	Gastrointestinal: abdomen soft, NT/ND; no rebound or guarding; +BSx4  	Back: spine midline, no bony tenderness or step-offs; no CVAT B/L  	Extremities: WWP, no clubbing or cyanosis; no peripheral edema  	Musculoskeletal: unable to assess  	Vascular: 2+ radial, femoral, DP/PT pulses B/L  	Dermatologic: skin warm, dry and intact; no rashes, wounds, or scars  Lymphatic: no submandibular or cervical LAD  Neurologic: Patient intubated    MEDICATIONS:  MEDICATIONS  (STANDING):  albuterol/ipratropium for Nebulization 3 milliLiter(s) Nebulizer every 4 hours  azithromycin  IVPB 500 milliGRAM(s) IV Intermittent every 24 hours  chlorhexidine 0.12% Liquid 15 milliLiter(s) Oral Mucosa every 12 hours  chlorhexidine 2% Cloths 1 Application(s) Topical daily  dextrose 5%. 1000 milliLiter(s) (50 mL/Hr) IV Continuous <Continuous>  dextrose 50% Injectable 12.5 Gram(s) IV Push once  dextrose 50% Injectable 25 Gram(s) IV Push once  dextrose 50% Injectable 25 Gram(s) IV Push once  enoxaparin Injectable 30 milliGRAM(s) SubCutaneous every 24 hours  fentaNYL   Infusion. 1.5 MICROgram(s)/kG/Hr (7.5 mL/Hr) IV Continuous <Continuous>  insulin lispro (HumaLOG) corrective regimen sliding scale   SubCutaneous every 6 hours  lactated ringers. 1000 milliLiter(s) (50 mL/Hr) IV Continuous <Continuous>  norepinephrine Infusion 0.05 MICROgram(s)/kG/Min (4.688 mL/Hr) IV Continuous <Continuous>  pantoprazole  Injectable 40 milliGRAM(s) IV Push daily  polyethylene glycol 3350 17 Gram(s) Oral daily  propofol Infusion 5 MICROgram(s)/kG/Min (1.5 mL/Hr) IV Continuous <Continuous>    MEDICATIONS  (PRN):  acetaminophen  Suppository .. 650 milliGRAM(s) Rectal every 6 hours PRN Temp greater or equal to 38C (100.4F)  dextrose 40% Gel 15 Gram(s) Oral once PRN Blood Glucose LESS THAN 70 milliGRAM(s)/deciliter  glucagon  Injectable 1 milliGRAM(s) IntraMuscular once PRN Glucose LESS THAN 70 milligrams/deciliter      ALLERGIES:  Allergies    No Known Allergies    Intolerances        LABS:                        12.3   9.00  )-----------( 161      ( 11 Jan 2020 07:00 )             37.9     01-11    137  |  100  |  13  ----------------------------<  150<H>  3.9   |  24  |  0.87    Ca    8.4      11 Jan 2020 07:00  Phos  3.6     01-10  Mg     2.8     01-11            RADIOLOGY & ADDITIONAL TESTS: Reviewed.

## 2020-01-12 LAB
ANION GAP SERPL CALC-SCNC: 9 MMOL/L — SIGNIFICANT CHANGE UP (ref 5–17)
BASOPHILS # BLD AUTO: 0 K/UL — SIGNIFICANT CHANGE UP (ref 0–0.2)
BASOPHILS NFR BLD AUTO: 0 % — SIGNIFICANT CHANGE UP (ref 0–2)
BUN SERPL-MCNC: 17 MG/DL — SIGNIFICANT CHANGE UP (ref 7–23)
CALCIUM SERPL-MCNC: 8.6 MG/DL — SIGNIFICANT CHANGE UP (ref 8.4–10.5)
CHLORIDE SERPL-SCNC: 100 MMOL/L — SIGNIFICANT CHANGE UP (ref 96–108)
CO2 SERPL-SCNC: 30 MMOL/L — SIGNIFICANT CHANGE UP (ref 22–31)
CREAT SERPL-MCNC: 0.84 MG/DL — SIGNIFICANT CHANGE UP (ref 0.5–1.3)
EOSINOPHIL # BLD AUTO: 0 K/UL — SIGNIFICANT CHANGE UP (ref 0–0.5)
EOSINOPHIL NFR BLD AUTO: 0 % — SIGNIFICANT CHANGE UP (ref 0–6)
GLUCOSE BLDC GLUCOMTR-MCNC: 139 MG/DL — HIGH (ref 70–99)
GLUCOSE BLDC GLUCOMTR-MCNC: 253 MG/DL — HIGH (ref 70–99)
GLUCOSE BLDC GLUCOMTR-MCNC: 78 MG/DL — SIGNIFICANT CHANGE UP (ref 70–99)
GLUCOSE BLDC GLUCOMTR-MCNC: 99 MG/DL — SIGNIFICANT CHANGE UP (ref 70–99)
GLUCOSE SERPL-MCNC: 159 MG/DL — HIGH (ref 70–99)
HCT VFR BLD CALC: 34.2 % — LOW (ref 39–50)
HGB BLD-MCNC: 11.1 G/DL — LOW (ref 13–17)
IMM GRANULOCYTES NFR BLD AUTO: 0.5 % — SIGNIFICANT CHANGE UP (ref 0–1.5)
LYMPHOCYTES # BLD AUTO: 1.22 K/UL — SIGNIFICANT CHANGE UP (ref 1–3.3)
LYMPHOCYTES # BLD AUTO: 16.5 % — SIGNIFICANT CHANGE UP (ref 13–44)
MAGNESIUM SERPL-MCNC: 2.7 MG/DL — HIGH (ref 1.6–2.6)
MCHC RBC-ENTMCNC: 30.1 PG — SIGNIFICANT CHANGE UP (ref 27–34)
MCHC RBC-ENTMCNC: 32.5 GM/DL — SIGNIFICANT CHANGE UP (ref 32–36)
MCV RBC AUTO: 92.7 FL — SIGNIFICANT CHANGE UP (ref 80–100)
MONOCYTES # BLD AUTO: 0.56 K/UL — SIGNIFICANT CHANGE UP (ref 0–0.9)
MONOCYTES NFR BLD AUTO: 7.6 % — SIGNIFICANT CHANGE UP (ref 2–14)
NEUTROPHILS # BLD AUTO: 5.59 K/UL — SIGNIFICANT CHANGE UP (ref 1.8–7.4)
NEUTROPHILS NFR BLD AUTO: 75.4 % — SIGNIFICANT CHANGE UP (ref 43–77)
NRBC # BLD: 0 /100 WBCS — SIGNIFICANT CHANGE UP (ref 0–0)
PLATELET # BLD AUTO: 157 K/UL — SIGNIFICANT CHANGE UP (ref 150–400)
POTASSIUM SERPL-MCNC: 4.1 MMOL/L — SIGNIFICANT CHANGE UP (ref 3.5–5.3)
POTASSIUM SERPL-SCNC: 4.1 MMOL/L — SIGNIFICANT CHANGE UP (ref 3.5–5.3)
RBC # BLD: 3.69 M/UL — LOW (ref 4.2–5.8)
RBC # FLD: 14.6 % — HIGH (ref 10.3–14.5)
SODIUM SERPL-SCNC: 139 MMOL/L — SIGNIFICANT CHANGE UP (ref 135–145)
WBC # BLD: 7.41 K/UL — SIGNIFICANT CHANGE UP (ref 3.8–10.5)
WBC # FLD AUTO: 7.41 K/UL — SIGNIFICANT CHANGE UP (ref 3.8–10.5)

## 2020-01-12 PROCEDURE — 99291 CRITICAL CARE FIRST HOUR: CPT

## 2020-01-12 PROCEDURE — 70450 CT HEAD/BRAIN W/O DYE: CPT | Mod: 26

## 2020-01-12 PROCEDURE — 71045 X-RAY EXAM CHEST 1 VIEW: CPT | Mod: 26

## 2020-01-12 RX ORDER — CHLORHEXIDINE GLUCONATE 213 G/1000ML
15 SOLUTION TOPICAL EVERY 12 HOURS
Refills: 0 | Status: DISCONTINUED | OUTPATIENT
Start: 2020-01-12 | End: 2020-01-15

## 2020-01-12 RX ORDER — METOCLOPRAMIDE HCL 10 MG
5 TABLET ORAL EVERY 8 HOURS
Refills: 0 | Status: DISCONTINUED | OUTPATIENT
Start: 2020-01-12 | End: 2020-01-15

## 2020-01-12 RX ADMIN — Medication 3 MILLILITER(S): at 05:46

## 2020-01-12 RX ADMIN — PANTOPRAZOLE SODIUM 40 MILLIGRAM(S): 20 TABLET, DELAYED RELEASE ORAL at 12:04

## 2020-01-12 RX ADMIN — PROPOFOL 1.5 MICROGRAM(S)/KG/MIN: 10 INJECTION, EMULSION INTRAVENOUS at 06:32

## 2020-01-12 RX ADMIN — Medication 4.69 MICROGRAM(S)/KG/MIN: at 02:32

## 2020-01-12 RX ADMIN — SODIUM CHLORIDE 50 MILLILITER(S): 9 INJECTION, SOLUTION INTRAVENOUS at 17:16

## 2020-01-12 RX ADMIN — CHLORHEXIDINE GLUCONATE 15 MILLILITER(S): 213 SOLUTION TOPICAL at 17:14

## 2020-01-12 RX ADMIN — ENOXAPARIN SODIUM 30 MILLIGRAM(S): 100 INJECTION SUBCUTANEOUS at 17:15

## 2020-01-12 RX ADMIN — CHLORHEXIDINE GLUCONATE 15 MILLILITER(S): 213 SOLUTION TOPICAL at 06:33

## 2020-01-12 RX ADMIN — POLYETHYLENE GLYCOL 3350 17 GRAM(S): 17 POWDER, FOR SOLUTION ORAL at 12:04

## 2020-01-12 RX ADMIN — Medication 3 MILLILITER(S): at 13:02

## 2020-01-12 RX ADMIN — PROPOFOL 1.5 MICROGRAM(S)/KG/MIN: 10 INJECTION, EMULSION INTRAVENOUS at 02:32

## 2020-01-12 RX ADMIN — SODIUM CHLORIDE 50 MILLILITER(S): 9 INJECTION, SOLUTION INTRAVENOUS at 02:32

## 2020-01-12 RX ADMIN — Medication 3 MILLILITER(S): at 21:52

## 2020-01-12 RX ADMIN — CHLORHEXIDINE GLUCONATE 1 APPLICATION(S): 213 SOLUTION TOPICAL at 12:13

## 2020-01-12 RX ADMIN — Medication 40 MILLIGRAM(S): at 06:32

## 2020-01-12 RX ADMIN — Medication 6: at 12:12

## 2020-01-12 RX ADMIN — PROPOFOL 1.5 MICROGRAM(S)/KG/MIN: 10 INJECTION, EMULSION INTRAVENOUS at 22:37

## 2020-01-12 RX ADMIN — AZITHROMYCIN 255 MILLIGRAM(S): 500 TABLET, FILM COATED ORAL at 10:27

## 2020-01-12 RX ADMIN — Medication 3 MILLILITER(S): at 01:56

## 2020-01-12 RX ADMIN — Medication 3 MILLILITER(S): at 17:27

## 2020-01-12 RX ADMIN — Medication 3 MILLILITER(S): at 09:31

## 2020-01-12 NOTE — PROGRESS NOTE ADULT - ASSESSMENT
73 yo M, no pertinent PMHx, no allergies, no home medications BIBEMS for shortness of beath, found to be in hypercapnic respiratory failure 2/2 RSV in context of possible COPD and chronic smoking history.      PULMONARY:  #Acute hypercapnic respiratory failure 2/2 RSV  Patient presented SOB, wheezing, RSV+ in setting of possible COPD and chronic smoker.  s/p solumedrol 125mg, NO infiltrate seen on chest xray  - c/w solumedrol 40mg IVP qd  - c/w Azithromycin 500 for antiinflammatory affects  - c/w Duonebs  - c/w Titrate vent settings to ABG    #possible COPD  Chest xray demonstrated enlarged lungs along with extensive smoking history  - c/w manangment as above    NEURO:  #Intubated  Patient biting on ET tube  - c/w Propofol drip  - c/w fentanyl drip  - c/w levophed for pressure support    HEMODYNAMICS:  #hypovolemic hyponatremia  -Na now 130  -LR @ 50cc/hr    ID:  #RSV positive  - management as above    #metabolic acidosis  -with rising gap from 6-->17  -lactate 5.6    CARDIOVASCULAR: DARIO    RENAL: DARIO    GI/FEN:  -Jevitty 1.2 @ 45ml/hr  -prostat    DISPO - continue intensive level of care in CCM/MICU service    LINES: peripheral access, holliday    PROPHYLACTIC MEASURE  F: NS 80cc/hr  E: replete K>4, Mg>2  N: NPO  VTE Prophylaxis: Lovenox 30mg  C: full code  D: MICU 75 yo M, no pertinent PMHx, no allergies, no home medications BIBEMS for shortness of beath, found to be in hypercapnic respiratory failure 2/2 RSV in context of possible COPD and chronic smoking history.      PULMONARY:  #Acute hypercapnic respiratory failure 2/2 RSV  Patient presented SOB, wheezing, RSV+ in setting of possible COPD and chronic smoker.  s/p solumedrol 125mg, NO infiltrate seen on chest xray  - c/w solumedrol 40mg IVP qd  - c/w Azithromycin 500 for antiinflammatory affects  - c/w Duonebs  - c/w Titrate vent settings to ABG    #possible COPD  Chest xray demonstrated enlarged lungs along with extensive smoking history  - c/w manangment as above    NEURO:  #Intubated  Patient biting on ET tube  - c/w Propofol drip  - c/w fentanyl drip  - c/w levophed for pressure support    #AMS- despite sedation holiday, pt remains confused and have vertical gaze preference suspicious for brain stem pathology  -f/u CT head  -f/u VEEG      #R/o stroke- see plan above     HEMODYNAMICS:  #hypovolemic hyponatremia  -Na now 130  -LR @ 50cc/hr    ID:  #RSV positive  - management as above    #Metabolic acidosis  -with rising gap from 6-->17  -lactate 5.6    CARDIOVASCULAR: DARIO    RENAL: DARIO    GI/FEN:  -Jevitty 1.2 @ 45ml/hr  -prostat    DISPO - continue intensive level of care in CCM/MICU service    LINES: peripheral access, holliday    PROPHYLACTIC MEASURE  F: NS 80cc/hr  E: replete K>4, Mg>2  N: NPO  VTE Prophylaxis: Lovenox 30mg  C: full code  D: MICU 73 yo M, no pertinent PMHx, no allergies, no home medications BIBEMS for shortness of beath, found to be in hypercapnic respiratory failure 2/2 RSV in context of possible COPD and chronic smoking history.      PULMONARY:  #Acute hypercapnic respiratory failure 2/2 RSV  Patient presented SOB, wheezing, RSV+ in setting of possible COPD and chronic smoker.  s/p solumedrol 125mg, NO infiltrate seen on chest xray  - c/w solumedrol 40mg IVP qd  - c/w Azithromycin 500 for antiinflammatory affects  - c/w Duonebs  - c/w Titrate vent settings to ABG    #possible COPD  Chest xray demonstrated enlarged lungs along with extensive smoking history  - c/w manangment as above    NEURO:  #Intubated  Patient biting on ET tube  - c/w Propofol drip  - c/w fentanyl drip  - c/w levophed for pressure support    #AMS- despite sedation holiday, pt remains confused and has vertical gaze preference suspicious for brain stem pathology  -f/u CT head  -f/u VEEG      #R/o stroke- see plan above     HEMODYNAMICS:  #hypovolemic hyponatremia  -Na now 130  -LR @ 50cc/hr    ID:  #RSV positive  - management as above    #Metabolic acidosis  -with rising gap from 6-->17  -lactate 5.6    CARDIOVASCULAR: DARIO    RENAL: DARIO    GI/FEN:  -Jevitty 1.2 @ 45ml/hr  -prostat    DISPO - continue intensive level of care in CCM/MICU service    LINES: peripheral access, holliday    PROPHYLACTIC MEASURE  F: NS 80cc/hr  E: replete K>4, Mg>2  N: NPO  VTE Prophylaxis: Lovenox 30mg  C: full code  D: MICU

## 2020-01-12 NOTE — PROGRESS NOTE ADULT - SUBJECTIVE AND OBJECTIVE BOX
**Incomplete Note**  INTERVAL HPI/OVERNIGHT EVENTS:  Patient was seen and examined at bedside. As per nurse and patient, no o/n events, patient resting comfortably. No complaints at this time. Patient denies: fever, chills, dizziness, weakness, HA, Changes in vision, CP, palpitations, SOB, cough, N/V/D/C, dysuria, changes in bowel movements, LE edema. ROS otherwise negative.    VITAL SIGNS:  T(F): 98.4 (01-12-20 @ 06:50)  HR: 42 (01-12-20 @ 06:00)  BP: 112/56 (01-12-20 @ 06:00)  RR: 23 (01-12-20 @ 06:00)  SpO2: 100% (01-12-20 @ 06:00)  Wt(kg): --      01-11-20 @ 07:01  -  01-12-20 @ 07:00  --------------------------------------------------------  IN: 2593.5 mL / OUT: 2215 mL / NET: 378.5 mL        PHYSICAL EXAM:    Constitutional: WDWN resting comfortably in bed; NAD  HEENT: NC/AT, PERRL, EOMI, anicteric sclera, no nasal discharge; uvula midline, no oropharyngeal erythema or exudates; MMM  Neck: supple; no JVD or thyromegaly  Respiratory: CTA B/L; no W/R/R, no retractions  Cardiac: +S1/S2; RRR; no M/R/G; PMI non-displaced  Gastrointestinal: soft, NT/ND; no rebound or guarding; +BSx4  Genitourinary: normal external genitalia  Back: spine midline, no bony tenderness or step-offs; no CVAT B/L  Extremities: WWP, no clubbing or cyanosis; no peripheral edema  Musculoskeletal: NROM x4; no joint swelling, tenderness or erythema  Vascular: 2+ radial, DP/PT pulses B/L  Dermatologic: skin warm, dry and intact; no rashes, wounds, or scars  Lymphatic: no submandibular or cervical LAD  Neurologic: AAOx3; CNII-XII grossly intact; no focal deficits  Psychiatric: affect and characteristics of appearance, verbalizations, behaviors are appropriate, denies SI/HI/AH/VH    MEDICATIONS  (STANDING):  albuterol/ipratropium for Nebulization 3 milliLiter(s) Nebulizer every 4 hours  azithromycin  IVPB 500 milliGRAM(s) IV Intermittent every 24 hours  chlorhexidine 0.12% Liquid 15 milliLiter(s) Oral Mucosa every 12 hours  chlorhexidine 2% Cloths 1 Application(s) Topical daily  dextrose 5%. 1000 milliLiter(s) (50 mL/Hr) IV Continuous <Continuous>  dextrose 50% Injectable 12.5 Gram(s) IV Push once  dextrose 50% Injectable 25 Gram(s) IV Push once  dextrose 50% Injectable 25 Gram(s) IV Push once  enoxaparin Injectable 30 milliGRAM(s) SubCutaneous every 24 hours  fentaNYL   Infusion. 1.5 MICROgram(s)/kG/Hr (7.5 mL/Hr) IV Continuous <Continuous>  insulin lispro (HumaLOG) corrective regimen sliding scale   SubCutaneous every 6 hours  lactated ringers. 1000 milliLiter(s) (50 mL/Hr) IV Continuous <Continuous>  methylPREDNISolone sodium succinate Injectable 40 milliGRAM(s) IV Push daily  norepinephrine Infusion 0.05 MICROgram(s)/kG/Min (4.688 mL/Hr) IV Continuous <Continuous>  pantoprazole  Injectable 40 milliGRAM(s) IV Push daily  polyethylene glycol 3350 17 Gram(s) Oral daily  propofol Infusion 5 MICROgram(s)/kG/Min (1.5 mL/Hr) IV Continuous <Continuous>    MEDICATIONS  (PRN):  acetaminophen  Suppository .. 650 milliGRAM(s) Rectal every 6 hours PRN Temp greater or equal to 38C (100.4F)  dextrose 40% Gel 15 Gram(s) Oral once PRN Blood Glucose LESS THAN 70 milliGRAM(s)/deciliter  glucagon  Injectable 1 milliGRAM(s) IntraMuscular once PRN Glucose LESS THAN 70 milligrams/deciliter      Allergies    No Known Allergies    Intolerances        LABS:                        11.1   7.41  )-----------( 157      ( 12 Jan 2020 06:41 )             34.2     01-11    137  |  100  |  13  ----------------------------<  150<H>  3.9   |  24  |  0.87    Ca    8.4      11 Jan 2020 07:00  Mg     2.8     01-11            RADIOLOGY & ADDITIONAL TESTS:  Reviewed INTERVAL HPI/OVERNIGHT EVENTS:  Pt seen and examined at bedside, as per nurse no over night events. Cannot obtain ROS d/t intubation/neurological status of pt      VITAL SIGNS:  T(F): 98.4 (01-12-20 @ 06:50)  HR: 42 (01-12-20 @ 06:00)  BP: 112/56 (01-12-20 @ 06:00)  RR: 23 (01-12-20 @ 06:00)  SpO2: 100% (01-12-20 @ 06:00)  Wt(kg): --      01-11-20 @ 07:01  -  01-12-20 @ 07:00  --------------------------------------------------------  IN: 2593.5 mL / OUT: 2215 mL / NET: 378.5 mL        PHYSICAL EXAM:    Constitutional: WDWN thin gentleman intubated and resting comfortably in bed; NAD  Head: NC/AT  Eyes: PERRL, EOMI, anicteric sclera  ENT: no nasal discharge; uvula midline, no oropharyngeal erythema or exudates; MMM  Neck: supple; no JVD or thyromegaly  Respiratory: decreased breath sounds B/L; no W/R/R, no retractions  Cardiac: +S1/S2; RRR; no M/R/G; PMI non-displaced  Gastrointestinal: abdomen soft, NT/ND; no rebound or guarding; +BSx4  Extremities: WWP, no clubbing or cyanosis; no peripheral edema  Musculoskeletal: unable to assess	  Vascular: 2+ radial, femoral, DP/PT pulses B/L  Dermatologic: skin warm, dry and intact; no rashes, wounds, or scars  Lymphatic: no submandibular or cervical LAD  Neurologic: Patient intubated    MEDICATIONS  (STANDING):  albuterol/ipratropium for Nebulization 3 milliLiter(s) Nebulizer every 4 hours  azithromycin  IVPB 500 milliGRAM(s) IV Intermittent every 24 hours  chlorhexidine 0.12% Liquid 15 milliLiter(s) Oral Mucosa every 12 hours  chlorhexidine 2% Cloths 1 Application(s) Topical daily  dextrose 5%. 1000 milliLiter(s) (50 mL/Hr) IV Continuous <Continuous>  dextrose 50% Injectable 12.5 Gram(s) IV Push once  dextrose 50% Injectable 25 Gram(s) IV Push once  dextrose 50% Injectable 25 Gram(s) IV Push once  enoxaparin Injectable 30 milliGRAM(s) SubCutaneous every 24 hours  fentaNYL   Infusion. 1.5 MICROgram(s)/kG/Hr (7.5 mL/Hr) IV Continuous <Continuous>  insulin lispro (HumaLOG) corrective regimen sliding scale   SubCutaneous every 6 hours  lactated ringers. 1000 milliLiter(s) (50 mL/Hr) IV Continuous <Continuous>  methylPREDNISolone sodium succinate Injectable 40 milliGRAM(s) IV Push daily  norepinephrine Infusion 0.05 MICROgram(s)/kG/Min (4.688 mL/Hr) IV Continuous <Continuous>  pantoprazole  Injectable 40 milliGRAM(s) IV Push daily  polyethylene glycol 3350 17 Gram(s) Oral daily  propofol Infusion 5 MICROgram(s)/kG/Min (1.5 mL/Hr) IV Continuous <Continuous>    MEDICATIONS  (PRN):  acetaminophen  Suppository .. 650 milliGRAM(s) Rectal every 6 hours PRN Temp greater or equal to 38C (100.4F)  dextrose 40% Gel 15 Gram(s) Oral once PRN Blood Glucose LESS THAN 70 milliGRAM(s)/deciliter  glucagon  Injectable 1 milliGRAM(s) IntraMuscular once PRN Glucose LESS THAN 70 milligrams/deciliter      Allergies    No Known Allergies    Intolerances        LABS:                        11.1   7.41  )-----------( 157      ( 12 Jan 2020 06:41 )             34.2     01-11    137  |  100  |  13  ----------------------------<  150<H>  3.9   |  24  |  0.87    Ca    8.4      11 Jan 2020 07:00  Mg     2.8     01-11            RADIOLOGY & ADDITIONAL TESTS:  Reviewed

## 2020-01-13 DIAGNOSIS — J44.9 CHRONIC OBSTRUCTIVE PULMONARY DISEASE, UNSPECIFIED: ICD-10-CM

## 2020-01-13 LAB
ANION GAP SERPL CALC-SCNC: 10 MMOL/L — SIGNIFICANT CHANGE UP (ref 5–17)
BUN SERPL-MCNC: 18 MG/DL — SIGNIFICANT CHANGE UP (ref 7–23)
CALCIUM SERPL-MCNC: 8.5 MG/DL — SIGNIFICANT CHANGE UP (ref 8.4–10.5)
CHLORIDE SERPL-SCNC: 100 MMOL/L — SIGNIFICANT CHANGE UP (ref 96–108)
CO2 SERPL-SCNC: 32 MMOL/L — HIGH (ref 22–31)
CREAT SERPL-MCNC: 0.85 MG/DL — SIGNIFICANT CHANGE UP (ref 0.5–1.3)
GLUCOSE BLDC GLUCOMTR-MCNC: 101 MG/DL — HIGH (ref 70–99)
GLUCOSE BLDC GLUCOMTR-MCNC: 112 MG/DL — HIGH (ref 70–99)
GLUCOSE BLDC GLUCOMTR-MCNC: 113 MG/DL — HIGH (ref 70–99)
GLUCOSE BLDC GLUCOMTR-MCNC: 89 MG/DL — SIGNIFICANT CHANGE UP (ref 70–99)
GLUCOSE SERPL-MCNC: 95 MG/DL — SIGNIFICANT CHANGE UP (ref 70–99)
GRAM STN FLD: SIGNIFICANT CHANGE UP
HCT VFR BLD CALC: 32.7 % — LOW (ref 39–50)
HCT VFR BLD CALC: 33.9 % — LOW (ref 39–50)
HGB BLD-MCNC: 10.3 G/DL — LOW (ref 13–17)
HGB BLD-MCNC: 10.6 G/DL — LOW (ref 13–17)
MAGNESIUM SERPL-MCNC: 2.6 MG/DL — SIGNIFICANT CHANGE UP (ref 1.6–2.6)
MCHC RBC-ENTMCNC: 29.7 PG — SIGNIFICANT CHANGE UP (ref 27–34)
MCHC RBC-ENTMCNC: 29.8 PG — SIGNIFICANT CHANGE UP (ref 27–34)
MCHC RBC-ENTMCNC: 31.3 GM/DL — LOW (ref 32–36)
MCHC RBC-ENTMCNC: 31.5 GM/DL — LOW (ref 32–36)
MCV RBC AUTO: 94.5 FL — SIGNIFICANT CHANGE UP (ref 80–100)
MCV RBC AUTO: 95 FL — SIGNIFICANT CHANGE UP (ref 80–100)
NRBC # BLD: 0 /100 WBCS — SIGNIFICANT CHANGE UP (ref 0–0)
NRBC # BLD: 0 /100 WBCS — SIGNIFICANT CHANGE UP (ref 0–0)
PHOSPHATE SERPL-MCNC: 2.9 MG/DL — SIGNIFICANT CHANGE UP (ref 2.5–4.5)
PLATELET # BLD AUTO: 157 K/UL — SIGNIFICANT CHANGE UP (ref 150–400)
PLATELET # BLD AUTO: 164 K/UL — SIGNIFICANT CHANGE UP (ref 150–400)
POTASSIUM SERPL-MCNC: 3.9 MMOL/L — SIGNIFICANT CHANGE UP (ref 3.5–5.3)
POTASSIUM SERPL-SCNC: 3.9 MMOL/L — SIGNIFICANT CHANGE UP (ref 3.5–5.3)
RBC # BLD: 3.46 M/UL — LOW (ref 4.2–5.8)
RBC # BLD: 3.57 M/UL — LOW (ref 4.2–5.8)
RBC # FLD: 14.4 % — SIGNIFICANT CHANGE UP (ref 10.3–14.5)
RBC # FLD: 14.6 % — HIGH (ref 10.3–14.5)
SODIUM SERPL-SCNC: 142 MMOL/L — SIGNIFICANT CHANGE UP (ref 135–145)
SPECIMEN SOURCE: SIGNIFICANT CHANGE UP
WBC # BLD: 7.03 K/UL — SIGNIFICANT CHANGE UP (ref 3.8–10.5)
WBC # BLD: 7.4 K/UL — SIGNIFICANT CHANGE UP (ref 3.8–10.5)
WBC # FLD AUTO: 7.03 K/UL — SIGNIFICANT CHANGE UP (ref 3.8–10.5)
WBC # FLD AUTO: 7.4 K/UL — SIGNIFICANT CHANGE UP (ref 3.8–10.5)

## 2020-01-13 PROCEDURE — 99291 CRITICAL CARE FIRST HOUR: CPT

## 2020-01-13 PROCEDURE — 99223 1ST HOSP IP/OBS HIGH 75: CPT | Mod: GC

## 2020-01-13 PROCEDURE — 71045 X-RAY EXAM CHEST 1 VIEW: CPT | Mod: 26,76

## 2020-01-13 RX ORDER — PROPOFOL 10 MG/ML
5 INJECTION, EMULSION INTRAVENOUS
Qty: 1000 | Refills: 0 | Status: DISCONTINUED | OUTPATIENT
Start: 2020-01-13 | End: 2020-01-14

## 2020-01-13 RX ORDER — DEXMEDETOMIDINE HYDROCHLORIDE IN 0.9% SODIUM CHLORIDE 4 UG/ML
0.2 INJECTION INTRAVENOUS
Qty: 200 | Refills: 0 | Status: DISCONTINUED | OUTPATIENT
Start: 2020-01-13 | End: 2020-01-13

## 2020-01-13 RX ORDER — FENTANYL CITRATE 50 UG/ML
1.5 INJECTION INTRAVENOUS
Qty: 2500 | Refills: 0 | Status: DISCONTINUED | OUTPATIENT
Start: 2020-01-13 | End: 2020-01-13

## 2020-01-13 RX ORDER — SODIUM CHLORIDE 9 MG/ML
500 INJECTION, SOLUTION INTRAVENOUS ONCE
Refills: 0 | Status: COMPLETED | OUTPATIENT
Start: 2020-01-13 | End: 2020-01-13

## 2020-01-13 RX ORDER — MIDAZOLAM HYDROCHLORIDE 1 MG/ML
5 INJECTION, SOLUTION INTRAMUSCULAR; INTRAVENOUS ONCE
Refills: 0 | Status: DISCONTINUED | OUTPATIENT
Start: 2020-01-13 | End: 2020-01-13

## 2020-01-13 RX ORDER — DEXMEDETOMIDINE HYDROCHLORIDE IN 0.9% SODIUM CHLORIDE 4 UG/ML
0.2 INJECTION INTRAVENOUS
Qty: 200 | Refills: 0 | Status: DISCONTINUED | OUTPATIENT
Start: 2020-01-13 | End: 2020-01-14

## 2020-01-13 RX ADMIN — PANTOPRAZOLE SODIUM 40 MILLIGRAM(S): 20 TABLET, DELAYED RELEASE ORAL at 12:04

## 2020-01-13 RX ADMIN — Medication 3 MILLILITER(S): at 15:44

## 2020-01-13 RX ADMIN — Medication 1 APPLICATION(S): at 22:44

## 2020-01-13 RX ADMIN — Medication 40 MILLIGRAM(S): at 03:13

## 2020-01-13 RX ADMIN — Medication 3 MILLILITER(S): at 10:08

## 2020-01-13 RX ADMIN — DEXMEDETOMIDINE HYDROCHLORIDE IN 0.9% SODIUM CHLORIDE 2.5 MICROGRAM(S)/KG/HR: 4 INJECTION INTRAVENOUS at 18:06

## 2020-01-13 RX ADMIN — Medication 3 MILLILITER(S): at 18:05

## 2020-01-13 RX ADMIN — Medication 3 MILLILITER(S): at 22:50

## 2020-01-13 RX ADMIN — Medication 3 MILLILITER(S): at 05:43

## 2020-01-13 RX ADMIN — Medication 3 MILLILITER(S): at 01:26

## 2020-01-13 RX ADMIN — CHLORHEXIDINE GLUCONATE 15 MILLILITER(S): 213 SOLUTION TOPICAL at 22:44

## 2020-01-13 RX ADMIN — PROPOFOL 1.5 MICROGRAM(S)/KG/MIN: 10 INJECTION, EMULSION INTRAVENOUS at 18:04

## 2020-01-13 RX ADMIN — Medication 1 APPLICATION(S): at 14:34

## 2020-01-13 RX ADMIN — AZITHROMYCIN 255 MILLIGRAM(S): 500 TABLET, FILM COATED ORAL at 09:24

## 2020-01-13 RX ADMIN — Medication 5 MILLIGRAM(S): at 01:23

## 2020-01-13 RX ADMIN — DEXMEDETOMIDINE HYDROCHLORIDE IN 0.9% SODIUM CHLORIDE 2.5 MICROGRAM(S)/KG/HR: 4 INJECTION INTRAVENOUS at 10:10

## 2020-01-13 RX ADMIN — DEXMEDETOMIDINE HYDROCHLORIDE IN 0.9% SODIUM CHLORIDE 2.5 MICROGRAM(S)/KG/HR: 4 INJECTION INTRAVENOUS at 14:35

## 2020-01-13 RX ADMIN — CHLORHEXIDINE GLUCONATE 15 MILLILITER(S): 213 SOLUTION TOPICAL at 10:10

## 2020-01-13 RX ADMIN — Medication 5 MILLIGRAM(S): at 19:53

## 2020-01-13 RX ADMIN — SODIUM CHLORIDE 2000 MILLILITER(S): 9 INJECTION, SOLUTION INTRAVENOUS at 18:05

## 2020-01-13 RX ADMIN — POLYETHYLENE GLYCOL 3350 17 GRAM(S): 17 POWDER, FOR SOLUTION ORAL at 12:04

## 2020-01-13 RX ADMIN — ENOXAPARIN SODIUM 30 MILLIGRAM(S): 100 INJECTION SUBCUTANEOUS at 18:06

## 2020-01-13 RX ADMIN — Medication 5 MILLIGRAM(S): at 10:22

## 2020-01-13 NOTE — CONSULT NOTE ADULT - ATTENDING COMMENTS
No wheezing on exam, still pooling nice volumes on minimal PS 8/5 with good saturation, but on and off anxious.  Would continue PST and check ABG 30 min later. Would start anxiolytics to increase comfort level.  Extubation should be to NIV.

## 2020-01-13 NOTE — PROGRESS NOTE ADULT - ASSESSMENT
73 yo M, no pertinent PMHx, no allergies, no home medications BIBEMS for shortness of beath, found to be in hypercapnic respiratory failure 2/2 RSV in context of possible COPD and chronic smoking history.      PULMONARY:  #Acute hypercapnic respiratory failure 2/2 RSV  Patient presented SOB, wheezing, RSV+ in setting of possible COPD and chronic smoker.  s/p solumedrol 125mg, NO infiltrate seen on chest xray  - c/w solumedrol 40mg IVP qd  - c/w Azithromycin 500 for antiinflammatory affects  - c/w Duonebs  - c/w Titrate vent settings to ABG    #possible COPD  Chest xray demonstrated enlarged lungs along with extensive smoking history  - c/w manangment as above    NEURO:  #Intubated  Patient biting on ET tube  - c/w Propofol drip  - c/w fentanyl drip  - c/w precedex    #AMS- despite sedation holiday, pt remains confused and has vertical gaze preference suspicious for brain stem pathology  -f/u CT head  -f/u VEEG      #R/o stroke- see plan above     HEMODYNAMICS:  #hypovolemic hyponatremia  -Na now 130  -LR @ 50cc/hr    ID:  #RSV positive  - management as above    #Metabolic acidosis  -with rising gap from 6-->17  -lactate 5.6    CARDIOVASCULAR: DARIO    RENAL: DARIO    GI/FEN:  -Jevitty 1.2 @ 45ml/hr  -prostat    DISPO - continue intensive level of care in CCM/MICU service    LINES: peripheral access, holliday    PROPHYLACTIC MEASURE  F: NS 80cc/hr  E: replete K>4, Mg>2  N: NPO  VTE Prophylaxis: Lovenox 30mg  C: full code  D: MICU

## 2020-01-13 NOTE — CONSULT NOTE ADULT - PROBLEM SELECTOR RECOMMENDATION 9
Unclear history, but patient likely has obstructive lung disease - most likely COPD. Agree that RSV contributed to his exacerbation, and to continue with his current treatment of systemic steroids and bronchodilators. On pressure support at bedside patient was able to follow commands (even with sedation), have good tidal volume, and was not tachypneic. On examination, he had adequate breath sounds with only some wheezing. Course of intubation may be somewhat prolonged due to nature of viral infection on diseased lung, but suspect that he is near extubation. For now, can continue with current dose of steroids and duonebs at same frequency.    Recommend  - Daily CPAP trial - would try in conjunction with gradual titration of sedation (rather than abrupt cessation) since he was able to follow commands at bedside with current dose of propofol and precedex.  - Duonebs q4 standing  - Solumedrol daily at current dose  - If extubating, will need to be extubated to BiPAP

## 2020-01-13 NOTE — PROCEDURE NOTE - NSINFORMCONSENT_GEN_A_CORE
Benefits, risks, and possible complications of procedure explained to patient/caregiver who verbalized understanding and gave verbal consent. family/patient

## 2020-01-13 NOTE — CONSULT NOTE ADULT - ASSESSMENT
73 yo M with no pertinent medical history presenting for possible obstructive lung disease exacerbation in the setting of RSV infection. Consult for further pulmonary pathology management

## 2020-01-13 NOTE — EEG REPORT - NS EEG TEXT BOX
[facility] Department of Neurology  Inpatient Continuous video-Electroencephalography Report    Patient Name:	JACIEL MAYO    :	1945  MRN:	[id number]    Study Start Date/Time:  2020, 3:17:45 AM  Study End Date/Time:	    Referred by: Inge Carreno MD    Brief Clinical History:  JACIEL MAYO is a 74 year old Male w/ AMS    Diagnosis Code:   R56.9 convulsions/seizure  CPT: 64110 EEG with video 12-26h    Pertinent Medications on Initiation  Propofol and fentanyl drip     Acquisition Details:  Electroencephalography was acquired using a minimum of 21 channels on an [employer] system v 8.5.1 with electrode placement according to the standard International 10-20 system following ACNS (American Clinical Neurophysiology Society) guidelines for Long-Term Video EEG monitoring.  Anterior temporal T1 and T2 electrodes were utilized whenever possible.   The XLTEK automated spike & seizure detections were all reviewed in detail, in addition to extensive portions of raw EEG.    Day 1: 2020 @ 3:17:45 AM to morning @ 07:00 am  Background:  continuous, with predominantly delta>theta.frequencies.  Symmetry:  No persistent asymmetries of voltage or frequency.  Posterior Dominant Rhythm:  No clear PDR   Organization: Rudimentary.  Voltage:  Normal (20+ uV)  Variability: Yes. 						  Reactivity: Yes.  N2 sleep: Rudimentary but likely N2 transients present.  Spontaneous Activity:  No epileptiform discharges.  Periodic/rhythmic activity:  None.  Events:  No electrographic seizures or significant clinical events.  Provocations:  Hyperventilation and Photic stimulation: was not performed.    Daily Summary:    Moderate generalized  slowing suggestive of a similar degree of diffuse or multifocal  dysfunction.  No epileptiform activity and no significant clinical events occurred.    Read by:  Celia Squires MD

## 2020-01-13 NOTE — PROGRESS NOTE ADULT - SUBJECTIVE AND OBJECTIVE BOX
INTERVAL HPI/OVERNIGHT EVENTS: O/N: called EEG, Irvin tech to place EEG leads overnight. Per attending started [id number] TID for >300cc tube feed residuals. This morning tube feeds were resumed but patient pulled NGT, NGT was replaced pending xray confirmation. failed CPAP trial; On prop and precedex for sedation. Gave 500 cc bolus LR for decreased UOP. VEEG on.      SUBJECTIVE: Patient seen and examined at bedside. Patient sucking on tube, poor respiratory effort. Unable to participate in exam as patient is intubated.    OBJECTIVE:    VITAL SIGNS:  ICU Vital Signs Last 24 Hrs  T(C): 37.3 (13 Jan 2020 13:00), Max: 37.4 (13 Jan 2020 09:19)  T(F): 99.2 (13 Jan 2020 13:00), Max: 99.3 (13 Jan 2020 09:19)  HR: 71 (13 Jan 2020 16:48) (46 - 102)  BP: 91/53 (13 Jan 2020 16:00) (87/51 - 198/74)  BP(mean): 65 (13 Jan 2020 16:00) (59 - 118)  ABP: --  ABP(mean): --  RR: 25 (13 Jan 2020 16:48) (10 - 33)  SpO2: 98% (13 Jan 2020 16:48) (94% - 100%)    Mode: CPAP with PS, FiO2: 40, PEEP: 5, PS: 8, MAP: 8, PIP: 14    01-12 @ 07:01 - 01-13 @ 07:00  --------------------------------------------------------  IN: 2232 mL / OUT: 1355 mL / NET: 877 mL    01-13 @ 07:01 - 01-13 @ 17:43  --------------------------------------------------------  IN: 692.8 mL / OUT: 280 mL / NET: 412.8 mL      CAPILLARY BLOOD GLUCOSE      POCT Blood Glucose.: 112 mg/dL (13 Jan 2020 16:50)      PHYSICAL EXAM:  Constitutional: WDWN thin gentleman intubated and resting comfortably in bed; NAD  Head: NC/AT  Eyes: PERRL, EOMI, anicteric sclera  ENT: no nasal discharge; uvula midline, no oropharyngeal erythema or exudates; MMM  Neck: supple; no JVD or thyromegaly  Respiratory: decreased breath sounds B/L; no W/R/R, no retractions  Cardiac: +S1/S2; RRR; no M/R/G; PMI non-displaced  Gastrointestinal: abdomen soft, NT/ND; no rebound or guarding; +BSx4  Extremities: WWP, no clubbing or cyanosis; no peripheral edema  Musculoskeletal: unable to assess	  Vascular: 2+ radial, femoral, DP/PT pulses B/L  Dermatologic: skin warm, dry and intact; no rashes, wounds, or scars  Lymphatic: no submandibular or cervical LAD  Neurologic: Patient intubated    MEDICATIONS:  MEDICATIONS  (STANDING):  albuterol/ipratropium for Nebulization 3 milliLiter(s) Nebulizer every 4 hours  azithromycin  IVPB 500 milliGRAM(s) IV Intermittent every 24 hours  chlorhexidine 0.12% Liquid 15 milliLiter(s) Oral Mucosa every 12 hours  chlorhexidine 2% Cloths 1 Application(s) Topical daily  dexMEDEtomidine Infusion 0.2 MICROgram(s)/kG/Hr (2.5 mL/Hr) IV Continuous <Continuous>  dextrose 5%. 1000 milliLiter(s) (50 mL/Hr) IV Continuous <Continuous>  dextrose 50% Injectable 12.5 Gram(s) IV Push once  dextrose 50% Injectable 25 Gram(s) IV Push once  dextrose 50% Injectable 25 Gram(s) IV Push once  enoxaparin Injectable 30 milliGRAM(s) SubCutaneous every 24 hours  insulin lispro (HumaLOG) corrective regimen sliding scale   SubCutaneous every 6 hours  lactated ringers Bolus 500 milliLiter(s) IV Bolus once  methylPREDNISolone sodium succinate Injectable 40 milliGRAM(s) IV Push daily  metoclopramide 5 milliGRAM(s) Oral every 8 hours  pantoprazole  Injectable 40 milliGRAM(s) IV Push daily  petrolatum Ophthalmic Ointment 1 Application(s) Both EYES three times a day  polyethylene glycol 3350 17 Gram(s) Oral daily  propofol Infusion 5 MICROgram(s)/kG/Min (1.5 mL/Hr) IV Continuous <Continuous>    MEDICATIONS  (PRN):  acetaminophen  Suppository .. 650 milliGRAM(s) Rectal every 6 hours PRN Temp greater or equal to 38C (100.4F)  dextrose 40% Gel 15 Gram(s) Oral once PRN Blood Glucose LESS THAN 70 milliGRAM(s)/deciliter  glucagon  Injectable 1 milliGRAM(s) IntraMuscular once PRN Glucose LESS THAN 70 milligrams/deciliter      ALLERGIES:  Allergies    No Known Allergies    Intolerances        LABS:                        10.6   7.40  )-----------( 157      ( 13 Jan 2020 06:34 )             33.9     01-13    142  |  100  |  18  ----------------------------<  95  3.9   |  32<H>  |  0.85    Ca    8.5      13 Jan 2020 06:34  Phos  2.9     01-13  Mg     2.6     01-13            RADIOLOGY & ADDITIONAL TESTS: Reviewed.

## 2020-01-14 LAB
ANION GAP SERPL CALC-SCNC: 8 MMOL/L — SIGNIFICANT CHANGE UP (ref 5–17)
BASOPHILS # BLD AUTO: 0.02 K/UL — SIGNIFICANT CHANGE UP (ref 0–0.2)
BASOPHILS NFR BLD AUTO: 0.3 % — SIGNIFICANT CHANGE UP (ref 0–2)
BUN SERPL-MCNC: 20 MG/DL — SIGNIFICANT CHANGE UP (ref 7–23)
CALCIUM SERPL-MCNC: 8.6 MG/DL — SIGNIFICANT CHANGE UP (ref 8.4–10.5)
CHLORIDE SERPL-SCNC: 98 MMOL/L — SIGNIFICANT CHANGE UP (ref 96–108)
CO2 SERPL-SCNC: 32 MMOL/L — HIGH (ref 22–31)
CREAT SERPL-MCNC: 0.82 MG/DL — SIGNIFICANT CHANGE UP (ref 0.5–1.3)
CULTURE RESULTS: SIGNIFICANT CHANGE UP
CULTURE RESULTS: SIGNIFICANT CHANGE UP
EOSINOPHIL # BLD AUTO: 0.16 K/UL — SIGNIFICANT CHANGE UP (ref 0–0.5)
EOSINOPHIL NFR BLD AUTO: 2.5 % — SIGNIFICANT CHANGE UP (ref 0–6)
GLUCOSE BLDC GLUCOMTR-MCNC: 120 MG/DL — HIGH (ref 70–99)
GLUCOSE BLDC GLUCOMTR-MCNC: 131 MG/DL — HIGH (ref 70–99)
GLUCOSE BLDC GLUCOMTR-MCNC: 132 MG/DL — HIGH (ref 70–99)
GLUCOSE BLDC GLUCOMTR-MCNC: 132 MG/DL — HIGH (ref 70–99)
GLUCOSE SERPL-MCNC: 157 MG/DL — HIGH (ref 70–99)
HCT VFR BLD CALC: 32.7 % — LOW (ref 39–50)
HGB BLD-MCNC: 10.4 G/DL — LOW (ref 13–17)
IMM GRANULOCYTES NFR BLD AUTO: 0.9 % — SIGNIFICANT CHANGE UP (ref 0–1.5)
LYMPHOCYTES # BLD AUTO: 1.39 K/UL — SIGNIFICANT CHANGE UP (ref 1–3.3)
LYMPHOCYTES # BLD AUTO: 21.5 % — SIGNIFICANT CHANGE UP (ref 13–44)
MAGNESIUM SERPL-MCNC: 2.2 MG/DL — SIGNIFICANT CHANGE UP (ref 1.6–2.6)
MCHC RBC-ENTMCNC: 30.2 PG — SIGNIFICANT CHANGE UP (ref 27–34)
MCHC RBC-ENTMCNC: 31.8 GM/DL — LOW (ref 32–36)
MCV RBC AUTO: 95.1 FL — SIGNIFICANT CHANGE UP (ref 80–100)
MONOCYTES # BLD AUTO: 1.03 K/UL — HIGH (ref 0–0.9)
MONOCYTES NFR BLD AUTO: 15.9 % — HIGH (ref 2–14)
NEUTROPHILS # BLD AUTO: 3.82 K/UL — SIGNIFICANT CHANGE UP (ref 1.8–7.4)
NEUTROPHILS NFR BLD AUTO: 58.9 % — SIGNIFICANT CHANGE UP (ref 43–77)
NRBC # BLD: 0 /100 WBCS — SIGNIFICANT CHANGE UP (ref 0–0)
PHOSPHATE SERPL-MCNC: 3.9 MG/DL — SIGNIFICANT CHANGE UP (ref 2.5–4.5)
PLATELET # BLD AUTO: 164 K/UL — SIGNIFICANT CHANGE UP (ref 150–400)
POTASSIUM SERPL-MCNC: 3.9 MMOL/L — SIGNIFICANT CHANGE UP (ref 3.5–5.3)
POTASSIUM SERPL-SCNC: 3.9 MMOL/L — SIGNIFICANT CHANGE UP (ref 3.5–5.3)
RBC # BLD: 3.44 M/UL — LOW (ref 4.2–5.8)
RBC # FLD: 14.4 % — SIGNIFICANT CHANGE UP (ref 10.3–14.5)
SODIUM SERPL-SCNC: 138 MMOL/L — SIGNIFICANT CHANGE UP (ref 135–145)
SPECIMEN SOURCE: SIGNIFICANT CHANGE UP
SPECIMEN SOURCE: SIGNIFICANT CHANGE UP
TRIGL SERPL-MCNC: 150 MG/DL — HIGH (ref 10–149)
WBC # BLD: 6.48 K/UL — SIGNIFICANT CHANGE UP (ref 3.8–10.5)
WBC # FLD AUTO: 6.48 K/UL — SIGNIFICANT CHANGE UP (ref 3.8–10.5)

## 2020-01-14 PROCEDURE — 99233 SBSQ HOSP IP/OBS HIGH 50: CPT | Mod: GC

## 2020-01-14 PROCEDURE — 71045 X-RAY EXAM CHEST 1 VIEW: CPT | Mod: 26

## 2020-01-14 PROCEDURE — 95720 EEG PHY/QHP EA INCR W/VEEG: CPT

## 2020-01-14 PROCEDURE — 99232 SBSQ HOSP IP/OBS MODERATE 35: CPT | Mod: GC

## 2020-01-14 RX ORDER — PANTOPRAZOLE SODIUM 20 MG/1
40 TABLET, DELAYED RELEASE ORAL DAILY
Refills: 0 | Status: DISCONTINUED | OUTPATIENT
Start: 2020-01-14 | End: 2020-01-16

## 2020-01-14 RX ORDER — SODIUM CHLORIDE 9 MG/ML
500 INJECTION, SOLUTION INTRAVENOUS
Refills: 0 | Status: DISCONTINUED | OUTPATIENT
Start: 2020-01-14 | End: 2020-01-14

## 2020-01-14 RX ORDER — PROPOFOL 10 MG/ML
5 INJECTION, EMULSION INTRAVENOUS
Qty: 1000 | Refills: 0 | Status: DISCONTINUED | OUTPATIENT
Start: 2020-01-14 | End: 2020-01-15

## 2020-01-14 RX ORDER — DEXMEDETOMIDINE HYDROCHLORIDE IN 0.9% SODIUM CHLORIDE 4 UG/ML
0.2 INJECTION INTRAVENOUS
Qty: 200 | Refills: 0 | Status: DISCONTINUED | OUTPATIENT
Start: 2020-01-14 | End: 2020-01-15

## 2020-01-14 RX ORDER — AZITHROMYCIN 500 MG/1
250 TABLET, FILM COATED ORAL DAILY
Refills: 0 | Status: DISCONTINUED | OUTPATIENT
Start: 2020-01-15 | End: 2020-01-16

## 2020-01-14 RX ORDER — BUDESONIDE, MICRONIZED 100 %
0.5 POWDER (GRAM) MISCELLANEOUS EVERY 12 HOURS
Refills: 0 | Status: DISCONTINUED | OUTPATIENT
Start: 2020-01-14 | End: 2020-01-21

## 2020-01-14 RX ORDER — IPRATROPIUM/ALBUTEROL SULFATE 18-103MCG
3 AEROSOL WITH ADAPTER (GRAM) INHALATION ONCE
Refills: 0 | Status: COMPLETED | OUTPATIENT
Start: 2020-01-14 | End: 2020-01-14

## 2020-01-14 RX ADMIN — ENOXAPARIN SODIUM 30 MILLIGRAM(S): 100 INJECTION SUBCUTANEOUS at 17:04

## 2020-01-14 RX ADMIN — Medication 1 APPLICATION(S): at 22:50

## 2020-01-14 RX ADMIN — AZITHROMYCIN 255 MILLIGRAM(S): 500 TABLET, FILM COATED ORAL at 09:39

## 2020-01-14 RX ADMIN — Medication 1 APPLICATION(S): at 15:21

## 2020-01-14 RX ADMIN — PANTOPRAZOLE SODIUM 40 MILLIGRAM(S): 20 TABLET, DELAYED RELEASE ORAL at 15:24

## 2020-01-14 RX ADMIN — Medication 3 MILLILITER(S): at 23:24

## 2020-01-14 RX ADMIN — Medication 5 MILLIGRAM(S): at 17:04

## 2020-01-14 RX ADMIN — Medication 3 MILLILITER(S): at 17:00

## 2020-01-14 RX ADMIN — Medication 3 MILLILITER(S): at 10:30

## 2020-01-14 RX ADMIN — Medication 1 APPLICATION(S): at 06:14

## 2020-01-14 RX ADMIN — CHLORHEXIDINE GLUCONATE 15 MILLILITER(S): 213 SOLUTION TOPICAL at 22:50

## 2020-01-14 RX ADMIN — Medication 40 MILLIGRAM(S): at 06:40

## 2020-01-14 RX ADMIN — CHLORHEXIDINE GLUCONATE 1 APPLICATION(S): 213 SOLUTION TOPICAL at 05:23

## 2020-01-14 RX ADMIN — Medication 3 MILLILITER(S): at 09:13

## 2020-01-14 RX ADMIN — Medication 0.5 MILLIGRAM(S): at 10:30

## 2020-01-14 RX ADMIN — Medication 5 MILLIGRAM(S): at 01:19

## 2020-01-14 RX ADMIN — Medication 3 MILLILITER(S): at 02:34

## 2020-01-14 RX ADMIN — Medication 3 MILLILITER(S): at 13:00

## 2020-01-14 RX ADMIN — Medication 5 MILLIGRAM(S): at 09:39

## 2020-01-14 RX ADMIN — CHLORHEXIDINE GLUCONATE 15 MILLILITER(S): 213 SOLUTION TOPICAL at 09:39

## 2020-01-14 RX ADMIN — POLYETHYLENE GLYCOL 3350 17 GRAM(S): 17 POWDER, FOR SOLUTION ORAL at 15:24

## 2020-01-14 RX ADMIN — Medication 3 MILLILITER(S): at 06:10

## 2020-01-14 NOTE — EEG REPORT - NS EEG TEXT BOX
[facility] Department of Neurology  Inpatient Continuous video-Electroencephalography Report    Patient Name:	JACIEL MAYO    :	1945  MRN:	[id number]    Study Start Date/Time:  2020, 3:17:45 AM  Study End Date/Time:	    Referred by: Inge Carreno MD    Brief Clinical History:  JACIEL MAYO is a 74 year old Male w/ AMS    Diagnosis Code:   R56.9 convulsions/seizure  CPT: 05865 EEG with video 12-26h    Pertinent Medications on Initiation  Propofol and fentanyl drip     Acquisition Details:  Electroencephalography was acquired using a minimum of 21 channels on an [employer] system v 8.5.1 with electrode placement according to the standard International 10-20 system following ACNS (American Clinical Neurophysiology Society) guidelines for Long-Term Video EEG monitoring.  Anterior temporal T1 and T2 electrodes were utilized whenever possible.   The XLTEK automated spike & seizure detections were all reviewed in detail, in addition to extensive portions of raw EEG.    Day 1: 2020 @ 3:17:45 AM to morning @ 07:00 am  Background:  continuous, with predominantly delta>theta.frequencies.  Symmetry:  No persistent asymmetries of voltage or frequency.  Posterior Dominant Rhythm:  No clear PDR   Organization: Rudimentary.  Voltage:  Normal (20+ uV)  Variability: Yes. 						  Reactivity: Yes.  N2 sleep: Rudimentary but likely N2 transients present.  Spontaneous Activity:  No epileptiform discharges.  Periodic/rhythmic activity:  None.  Events:  No electrographic seizures or significant clinical events.  Provocations:  Hyperventilation and Photic stimulation: was not performed.  Daily Summary:    Moderate generalized  slowing suggestive of a similar degree of diffuse or multifocal  dysfunction.  No epileptiform activity and no significant clinical events occurred.    Read by:  Celia Squires MD        Daily Updates (from 07:00 am until 07:00 am):  Day 2  -2020  Pertinent medications: Propofol drip    Background unchanged.    Moderate generalized  slowing suggestive of a similar degree of diffuse or multifocal  dysfunction.    No epileptiform discharges, significant clinical or electrographic events.     Daily Summary:  No change from prior day, no events occurred.    Read by:  Celia Squires MD

## 2020-01-14 NOTE — PROGRESS NOTE ADULT - ASSESSMENT
73 yo M, no pertinent PMHx, no allergies, no home medications BIBEMS for shortness of beath, found to be in hypercapnic respiratory failure 2/2 RSV in context of possible COPD and chronic smoking history.      PULMONARY:  #Acute hypercapnic respiratory failure 2/2 RSV  Patient presented SOB, wheezing, RSV+ in setting of possible COPD and chronic smoker.  s/p solumedrol 125mg, NO infiltrate seen on chest xray  - c/w solumedrol 40mg IVP qd  - c/w Azithromycin 250 for antiinflammatory affects  - c/w Duonebs  - c/w Titrate vent settings to ABG    #possible COPD  Chest xray demonstrated enlarged lungs along with extensive smoking history  - c/w manangment as above    NEURO:  #Intubated  Patient biting on ET tube  - c/w Propofol drip  - c/w precedex    #AMS- despite sedation holiday, pt remains confused and has vertical gaze preference suspicious for brain stem pathology  -f/u CT head  -VEEG negative.     #R/o stroke- see plan above     HEMODYNAMICS:  #hypovolemic hyponatremia  -Na now 130  -LR @ 50cc/hr    ID:  #RSV positive  - management as above    #Metabolic acidosis  -with rising gap from 6-->17  -lactate 5.6--> 2.1    CARDIOVASCULAR: DARIO    RENAL: DARIO    GI/FEN:  -Jevitty 1.5 @ 45ml/hr  -prostat    DISPO - continue intensive level of care in CCM/MICU service    LINES: peripheral access, holliday    PROPHYLACTIC MEASURE  F: NS 80cc/hr  E: replete K>4, Mg>2  N: NPO  VTE Prophylaxis: Lovenox 30mg  C: full code  D: MICU

## 2020-01-14 NOTE — PROGRESS NOTE ADULT - SUBJECTIVE AND OBJECTIVE BOX
INTERVAL HPI/OVERNIGHT EVENTS:    SUBJECTIVE: Patient seen and examined at bedside.     CONSTITUTIONAL: No weakness, fevers or chills  EYES/ENT: No visual changes;  No vertigo or throat pain   NECK: No pain or stiffness  RESPIRATORY: No cough, wheezing, hemoptysis; No shortness of breath  CARDIOVASCULAR: No chest pain or palpitations  GASTROINTESTINAL: No abdominal or epigastric pain. No nausea, vomiting, or hematemesis; No diarrhea or constipation. No melena or hematochezia.  GENITOURINARY: No dysuria, frequency or hematuria  NEUROLOGICAL: No numbness or weakness  SKIN: No itching, rashes    OBJECTIVE:    VITAL SIGNS:  ICU Vital Signs Last 24 Hrs  T(C): 36.6 (14 Jan 2020 01:06), Max: 37.6 (13 Jan 2020 18:00)  T(F): 97.8 (14 Jan 2020 01:06), Max: 99.7 (13 Jan 2020 18:00)  HR: 70 (14 Jan 2020 06:00) (48 - 102)  BP: 113/57 (14 Jan 2020 06:00) (87/46 - 198/74)  BP(mean): 80 (14 Jan 2020 06:00) (61 - 118)  ABP: --  ABP(mean): --  RR: 19 (14 Jan 2020 06:00) (9 - 33)  SpO2: 99% (14 Jan 2020 06:00) (94% - 100%)    Mode: AC/ CMV (Assist Control/ Continuous Mandatory Ventilation), RR (machine): 10, TV (machine): 480, FiO2: 40, PEEP: 5, ITime: 0.8, MAP: 10, PIP: 28    01-12 @ 07:01 - 01-13 @ 07:00  --------------------------------------------------------  IN: 2232 mL / OUT: 1355 mL / NET: 877 mL    01-13 @ 07:01 - 01-14 @ 06:43  --------------------------------------------------------  IN: 2572.8 mL / OUT: 640 mL / NET: 1932.8 mL      CAPILLARY BLOOD GLUCOSE      POCT Blood Glucose.: 120 mg/dL (14 Jan 2020 06:27)      PHYSICAL EXAM:    General: NAD  HEENT: NC/AT; PERRL, clear conjunctiva  Neck: supple  Respiratory: CTA b/l  Cardiovascular: +S1/S2; RRR  Abdomen: soft, NT/ND; +BS x4  Extremities: WWP, 2+ peripheral pulses b/l; no LE edema  Skin: normal color and turgor; no rash  Neurological:    MEDICATIONS:  MEDICATIONS  (STANDING):  albuterol/ipratropium for Nebulization 3 milliLiter(s) Nebulizer every 4 hours  azithromycin  IVPB 500 milliGRAM(s) IV Intermittent every 24 hours  chlorhexidine 0.12% Liquid 15 milliLiter(s) Oral Mucosa every 12 hours  chlorhexidine 2% Cloths 1 Application(s) Topical daily  dexMEDEtomidine Infusion 0.2 MICROgram(s)/kG/Hr (2.5 mL/Hr) IV Continuous <Continuous>  dextrose 5%. 1000 milliLiter(s) (50 mL/Hr) IV Continuous <Continuous>  dextrose 50% Injectable 12.5 Gram(s) IV Push once  dextrose 50% Injectable 25 Gram(s) IV Push once  dextrose 50% Injectable 25 Gram(s) IV Push once  enoxaparin Injectable 30 milliGRAM(s) SubCutaneous every 24 hours  insulin lispro (HumaLOG) corrective regimen sliding scale   SubCutaneous every 6 hours  lactated ringers. 500 milliLiter(s) (500 mL/Hr) IV Continuous <Continuous>  methylPREDNISolone sodium succinate Injectable 40 milliGRAM(s) IV Push daily  metoclopramide 5 milliGRAM(s) Oral every 8 hours  pantoprazole  Injectable 40 milliGRAM(s) IV Push daily  petrolatum Ophthalmic Ointment 1 Application(s) Both EYES three times a day  polyethylene glycol 3350 17 Gram(s) Oral daily  propofol Infusion 5 MICROgram(s)/kG/Min (1.5 mL/Hr) IV Continuous <Continuous>    MEDICATIONS  (PRN):  acetaminophen  Suppository .. 650 milliGRAM(s) Rectal every 6 hours PRN Temp greater or equal to 38C (100.4F)  dextrose 40% Gel 15 Gram(s) Oral once PRN Blood Glucose LESS THAN 70 milliGRAM(s)/deciliter  glucagon  Injectable 1 milliGRAM(s) IntraMuscular once PRN Glucose LESS THAN 70 milligrams/deciliter      ALLERGIES:  Allergies    No Known Allergies    Intolerances        LABS:                        10.4   6.48  )-----------( 164      ( 14 Jan 2020 06:15 )             32.7     01-13    142  |  100  |  18  ----------------------------<  95  3.9   |  32<H>  |  0.85    Ca    8.5      13 Jan 2020 06:34  Phos  2.9     01-13  Mg     2.6     01-13            RADIOLOGY & ADDITIONAL TESTS: Reviewed. INTERVAL HPI/OVERNIGHT EVENTS: ON: gave 500 cc LR over 1 hr for decreasing UOP. This morning discontinued vEEG, as there were no epileptiform discharges, significant clinical or electrographic events. Attempted to extubate to bipap but patient in severe respiratory distress on CPAP. Replaced NGT and restarted tube feeds. Decreased azithro to COPD maintenance dose.     SUBJECTIVE: Patient seen and examined at bedside. Patient is intubated and sedated.     OBJECTIVE:    VITAL SIGNS:  ICU Vital Signs Last 24 Hrs  T(C): 36.6 (14 Jan 2020 01:06), Max: 37.6 (13 Jan 2020 18:00)  T(F): 97.8 (14 Jan 2020 01:06), Max: 99.7 (13 Jan 2020 18:00)  HR: 70 (14 Jan 2020 06:00) (48 - 102)  BP: 113/57 (14 Jan 2020 06:00) (87/46 - 198/74)  BP(mean): 80 (14 Jan 2020 06:00) (61 - 118)  ABP: --  ABP(mean): --  RR: 19 (14 Jan 2020 06:00) (9 - 33)  SpO2: 99% (14 Jan 2020 06:00) (94% - 100%)    Mode: AC/ CMV (Assist Control/ Continuous Mandatory Ventilation), RR (machine): 10, TV (machine): 480, FiO2: 40, PEEP: 5, ITime: 0.8, MAP: 10, PIP: 28    01-12 @ 07:01 - 01-13 @ 07:00  --------------------------------------------------------  IN: 2232 mL / OUT: 1355 mL / NET: 877 mL    01-13 @ 07:01  - 01-14 @ 06:43  --------------------------------------------------------  IN: 2572.8 mL / OUT: 640 mL / NET: 1932.8 mL      CAPILLARY BLOOD GLUCOSE      POCT Blood Glucose.: 120 mg/dL (14 Jan 2020 06:27)      PHYSICAL EXAM:    Constitutional:  Thin gentleman intubated and resting comfortably in bed; NAD  Head: NC/AT  Eyes: PERRL, EOMI, anicteric sclera  ENT: no nasal discharge; uvula midline, no oropharyngeal erythema or exudates; MMM  Neck: supple; no JVD or thyromegaly  Respiratory: decreased breath sounds, poor respiratory effort B/L; no W/R/R, no retractions  Cardiac: +S1/S2; RRR; no M/R/G; PMI non-displaced  Gastrointestinal: abdomen soft, NT/ND; no rebound or guarding; +BSx4  Extremities: WWP, no clubbing or cyanosis; no peripheral edema  Musculoskeletal: unable to assess	  Vascular: 2+ radial, femoral, DP/PT pulses B/L  Dermatologic: skin warm, dry and intact; no rashes, wounds, or scars  Lymphatic: no submandibular or cervical LAD  Neurologic: Patient intubated, sedated.    MEDICATIONS:  MEDICATIONS  (STANDING):  albuterol/ipratropium for Nebulization 3 milliLiter(s) Nebulizer every 4 hours  azithromycin  IVPB 500 milliGRAM(s) IV Intermittent every 24 hours  chlorhexidine 0.12% Liquid 15 milliLiter(s) Oral Mucosa every 12 hours  chlorhexidine 2% Cloths 1 Application(s) Topical daily  dexMEDEtomidine Infusion 0.2 MICROgram(s)/kG/Hr (2.5 mL/Hr) IV Continuous <Continuous>  dextrose 5%. 1000 milliLiter(s) (50 mL/Hr) IV Continuous <Continuous>  dextrose 50% Injectable 12.5 Gram(s) IV Push once  dextrose 50% Injectable 25 Gram(s) IV Push once  dextrose 50% Injectable 25 Gram(s) IV Push once  enoxaparin Injectable 30 milliGRAM(s) SubCutaneous every 24 hours  insulin lispro (HumaLOG) corrective regimen sliding scale   SubCutaneous every 6 hours  lactated ringers. 500 milliLiter(s) (500 mL/Hr) IV Continuous <Continuous>  methylPREDNISolone sodium succinate Injectable 40 milliGRAM(s) IV Push daily  metoclopramide 5 milliGRAM(s) Oral every 8 hours  pantoprazole  Injectable 40 milliGRAM(s) IV Push daily  petrolatum Ophthalmic Ointment 1 Application(s) Both EYES three times a day  polyethylene glycol 3350 17 Gram(s) Oral daily  propofol Infusion 5 MICROgram(s)/kG/Min (1.5 mL/Hr) IV Continuous <Continuous>    MEDICATIONS  (PRN):  acetaminophen  Suppository .. 650 milliGRAM(s) Rectal every 6 hours PRN Temp greater or equal to 38C (100.4F)  dextrose 40% Gel 15 Gram(s) Oral once PRN Blood Glucose LESS THAN 70 milliGRAM(s)/deciliter  glucagon  Injectable 1 milliGRAM(s) IntraMuscular once PRN Glucose LESS THAN 70 milligrams/deciliter      ALLERGIES:  Allergies    No Known Allergies    Intolerances        LABS:                        10.4   6.48  )-----------( 164      ( 14 Jan 2020 06:15 )             32.7     01-13    142  |  100  |  18  ----------------------------<  95  3.9   |  32<H>  |  0.85    Ca    8.5      13 Jan 2020 06:34  Phos  2.9     01-13  Mg     2.6     01-13            RADIOLOGY & ADDITIONAL TESTS: Reviewed.

## 2020-01-15 LAB
ALBUMIN SERPL ELPH-MCNC: 3 G/DL — LOW (ref 3.3–5)
ALP SERPL-CCNC: 40 U/L — SIGNIFICANT CHANGE UP (ref 40–120)
ALT FLD-CCNC: 22 U/L — SIGNIFICANT CHANGE UP (ref 10–45)
ANION GAP SERPL CALC-SCNC: 10 MMOL/L — SIGNIFICANT CHANGE UP (ref 5–17)
AST SERPL-CCNC: 23 U/L — SIGNIFICANT CHANGE UP (ref 10–40)
BILIRUB SERPL-MCNC: 0.2 MG/DL — SIGNIFICANT CHANGE UP (ref 0.2–1.2)
BUN SERPL-MCNC: 18 MG/DL — SIGNIFICANT CHANGE UP (ref 7–23)
CALCIUM SERPL-MCNC: 8 MG/DL — LOW (ref 8.4–10.5)
CHLORIDE SERPL-SCNC: 98 MMOL/L — SIGNIFICANT CHANGE UP (ref 96–108)
CO2 SERPL-SCNC: 33 MMOL/L — HIGH (ref 22–31)
CREAT SERPL-MCNC: 0.83 MG/DL — SIGNIFICANT CHANGE UP (ref 0.5–1.3)
CULTURE RESULTS: SIGNIFICANT CHANGE UP
GAS PNL BLDA: SIGNIFICANT CHANGE UP
GLUCOSE BLDC GLUCOMTR-MCNC: 119 MG/DL — HIGH (ref 70–99)
GLUCOSE BLDC GLUCOMTR-MCNC: 125 MG/DL — HIGH (ref 70–99)
GLUCOSE BLDC GLUCOMTR-MCNC: 136 MG/DL — HIGH (ref 70–99)
GLUCOSE SERPL-MCNC: 132 MG/DL — HIGH (ref 70–99)
HCT VFR BLD CALC: 29.2 % — LOW (ref 39–50)
HGB BLD-MCNC: 9.3 G/DL — LOW (ref 13–17)
MAGNESIUM SERPL-MCNC: 2.3 MG/DL — SIGNIFICANT CHANGE UP (ref 1.6–2.6)
MCHC RBC-ENTMCNC: 29.8 PG — SIGNIFICANT CHANGE UP (ref 27–34)
MCHC RBC-ENTMCNC: 31.8 GM/DL — LOW (ref 32–36)
MCV RBC AUTO: 93.6 FL — SIGNIFICANT CHANGE UP (ref 80–100)
NRBC # BLD: 0 /100 WBCS — SIGNIFICANT CHANGE UP (ref 0–0)
PHOSPHATE SERPL-MCNC: 3.2 MG/DL — SIGNIFICANT CHANGE UP (ref 2.5–4.5)
PLATELET # BLD AUTO: 172 K/UL — SIGNIFICANT CHANGE UP (ref 150–400)
POTASSIUM SERPL-MCNC: 4.2 MMOL/L — SIGNIFICANT CHANGE UP (ref 3.5–5.3)
POTASSIUM SERPL-SCNC: 4.2 MMOL/L — SIGNIFICANT CHANGE UP (ref 3.5–5.3)
PROT SERPL-MCNC: 5.6 G/DL — LOW (ref 6–8.3)
RBC # BLD: 3.12 M/UL — LOW (ref 4.2–5.8)
RBC # FLD: 14.3 % — SIGNIFICANT CHANGE UP (ref 10.3–14.5)
SODIUM SERPL-SCNC: 141 MMOL/L — SIGNIFICANT CHANGE UP (ref 135–145)
SPECIMEN SOURCE: SIGNIFICANT CHANGE UP
WBC # BLD: 6.97 K/UL — SIGNIFICANT CHANGE UP (ref 3.8–10.5)
WBC # FLD AUTO: 6.97 K/UL — SIGNIFICANT CHANGE UP (ref 3.8–10.5)

## 2020-01-15 PROCEDURE — 71045 X-RAY EXAM CHEST 1 VIEW: CPT | Mod: 26

## 2020-01-15 PROCEDURE — 99233 SBSQ HOSP IP/OBS HIGH 50: CPT | Mod: GC

## 2020-01-15 PROCEDURE — 95720 EEG PHY/QHP EA INCR W/VEEG: CPT

## 2020-01-15 RX ORDER — DEXMEDETOMIDINE HYDROCHLORIDE IN 0.9% SODIUM CHLORIDE 4 UG/ML
0.2 INJECTION INTRAVENOUS
Qty: 200 | Refills: 0 | Status: DISCONTINUED | OUTPATIENT
Start: 2020-01-15 | End: 2020-01-16

## 2020-01-15 RX ORDER — ONDANSETRON 8 MG/1
4 TABLET, FILM COATED ORAL ONCE
Refills: 0 | Status: COMPLETED | OUTPATIENT
Start: 2020-01-15 | End: 2020-01-15

## 2020-01-15 RX ORDER — ONDANSETRON 8 MG/1
4 TABLET, FILM COATED ORAL EVERY 6 HOURS
Refills: 0 | Status: DISCONTINUED | OUTPATIENT
Start: 2020-01-15 | End: 2020-01-16

## 2020-01-15 RX ORDER — IPRATROPIUM/ALBUTEROL SULFATE 18-103MCG
3 AEROSOL WITH ADAPTER (GRAM) INHALATION ONCE
Refills: 0 | Status: COMPLETED | OUTPATIENT
Start: 2020-01-15 | End: 2020-01-15

## 2020-01-15 RX ADMIN — Medication 1 APPLICATION(S): at 05:36

## 2020-01-15 RX ADMIN — Medication 0.5 MILLIGRAM(S): at 09:33

## 2020-01-15 RX ADMIN — ENOXAPARIN SODIUM 30 MILLIGRAM(S): 100 INJECTION SUBCUTANEOUS at 17:22

## 2020-01-15 RX ADMIN — DEXMEDETOMIDINE HYDROCHLORIDE IN 0.9% SODIUM CHLORIDE 2.5 MICROGRAM(S)/KG/HR: 4 INJECTION INTRAVENOUS at 11:14

## 2020-01-15 RX ADMIN — AZITHROMYCIN 250 MILLIGRAM(S): 500 TABLET, FILM COATED ORAL at 17:21

## 2020-01-15 RX ADMIN — Medication 3 MILLILITER(S): at 17:43

## 2020-01-15 RX ADMIN — Medication 3 MILLILITER(S): at 13:06

## 2020-01-15 RX ADMIN — Medication 3 MILLILITER(S): at 09:00

## 2020-01-15 RX ADMIN — Medication 40 MILLIGRAM(S): at 17:21

## 2020-01-15 RX ADMIN — Medication 3 MILLILITER(S): at 21:32

## 2020-01-15 RX ADMIN — Medication 0.5 MILLIGRAM(S): at 23:47

## 2020-01-15 RX ADMIN — Medication 1 APPLICATION(S): at 22:06

## 2020-01-15 RX ADMIN — Medication 3 MILLILITER(S): at 03:12

## 2020-01-15 RX ADMIN — ONDANSETRON 4 MILLIGRAM(S): 8 TABLET, FILM COATED ORAL at 09:30

## 2020-01-15 RX ADMIN — Medication 40 MILLIGRAM(S): at 05:35

## 2020-01-15 RX ADMIN — Medication 5 MILLIGRAM(S): at 02:23

## 2020-01-15 RX ADMIN — Medication 0.5 MILLIGRAM(S): at 06:42

## 2020-01-15 RX ADMIN — PANTOPRAZOLE SODIUM 40 MILLIGRAM(S): 20 TABLET, DELAYED RELEASE ORAL at 17:22

## 2020-01-15 RX ADMIN — Medication 3 MILLILITER(S): at 09:06

## 2020-01-15 RX ADMIN — Medication 0.5 MILLIGRAM(S): at 17:43

## 2020-01-15 RX ADMIN — Medication 1 APPLICATION(S): at 14:00

## 2020-01-15 RX ADMIN — CHLORHEXIDINE GLUCONATE 1 APPLICATION(S): 213 SOLUTION TOPICAL at 05:36

## 2020-01-15 RX ADMIN — Medication 3 MILLILITER(S): at 06:43

## 2020-01-15 NOTE — EEG REPORT - NS EEG TEXT BOX
[facility] Department of Neurology  Inpatient Continuous video-Electroencephalography Report    Patient Name:	JACIEL MAYO    :	1945  MRN:	[id number]    Study Start Date/Time:  2020, 3:17:45 AM  Study End Date/Time:	    Referred by: Inge Carreno MD    Brief Clinical History:  JACIEL MAYO is a 74 year old Male w/ AMS    Diagnosis Code:   R56.9 convulsions/seizure  CPT: 64178 EEG with video 12-26h    Pertinent Medications on Initiation  Propofol and fentanyl drip     Acquisition Details:  Electroencephalography was acquired using a minimum of 21 channels on an [employer] system v 8.5.1 with electrode placement according to the standard International 10-20 system following ACNS (American Clinical Neurophysiology Society) guidelines for Long-Term Video EEG monitoring.  Anterior temporal T1 and T2 electrodes were utilized whenever possible.   The [employer] automated spike & seizure detections were all reviewed in detail, in addition to extensive portions of raw EEG.    Day 1: 2020 @ 3:17:45 AM to morning @ 07:00 am  Background:  continuous, with predominantly delta>theta.frequencies.  Symmetry:  No persistent asymmetries of voltage or frequency.  Posterior Dominant Rhythm:  No clear PDR   Organization: Rudimentary.  Voltage:  Normal (20+ uV)  Variability: Yes. 						  Reactivity: Yes.  N2 sleep: Rudimentary but likely N2 transients present.  Spontaneous Activity:  No epileptiform discharges.  Periodic/rhythmic activity:  None.  Events:  No electrographic seizures or significant clinical events.  Provocations:  Hyperventilation and Photic stimulation: was not performed.  Daily Summary:    Moderate generalized  slowing suggestive of a similar degree of diffuse or multifocal  dysfunction.  No epileptiform activity and no significant clinical events occurred.    Read by:  Celia Squires MD        Daily Updates (from 07:00 am until 07:00 am):  Day 2  -2020  Pertinent medications: Propofol drip    Background unchanged.    Moderate generalized  slowing suggestive of a similar degree of diffuse or multifocal  dysfunction.    No epileptiform discharges, significant clinical or electrographic events.     Daily Summary:  No change from prior day, no events occurred.    Read by:  Celia Squires MD        Day 3  -1/15/2020  Pertinent medications: Propofol drip (tapered)    Background improving.    Background:  continuous, with predominantly theta>delta.frequencies.  Symmetry:  No persistent asymmetries of voltage or frequency.  Posterior Dominant Rhythm:  7 Hz fragmented  Organization: Rudimentary.  Voltage:  Normal (20+ uV)  Variability: Yes. 						  Reactivity: Yes.  N2 sleep: Rudimentary but likely N2 transients present.  Spontaneous Activity:  No epileptiform discharges.  Periodic/rhythmic activity:  None.  Events:  No electrographic seizures or significant clinical events.     Daily Summary:   Some improvement In background to mild to moderate generalized  slowing suggestive of a similar degree of diffuse or multifocal  dysfunction.  No epileptiform activity and no significant clinical events occurred.    Read by:  Celia Squires MD

## 2020-01-15 NOTE — PROGRESS NOTE ADULT - SUBJECTIVE AND OBJECTIVE BOX
INTERVAL HPI/OVERNIGHT EVENTS:    SUBJECTIVE: Patient seen and examined at bedside.     ROS:  CONSTITUTIONAL: No weakness, fevers or chills  EYES/ENT: No visual changes;  No vertigo or throat pain   NECK: No pain or stiffness  RESPIRATORY: No cough, wheezing, hemoptysis; No shortness of breath  CARDIOVASCULAR: No chest pain or palpitations  GASTROINTESTINAL: No abdominal or epigastric pain. No nausea, vomiting, or hematemesis; No diarrhea or constipation. No melena or hematochezia.  GENITOURINARY: No dysuria, frequency or hematuria  NEUROLOGICAL: No numbness or weakness  SKIN: No itching, rashes    OBJECTIVE:    VITAL SIGNS:  ICU Vital Signs Last 24 Hrs  T(C): 37.7 (15 Nawaf 2020 00:00), Max: 37.9 (14 Jan 2020 09:30)  T(F): 99.9 (15 Nawaf 2020 00:00), Max: 100.3 (14 Jan 2020 09:30)  HR: 54 (15 Nawaf 2020 06:00) (54 - 86)  BP: 96/52 (15 Nawaf 2020 06:00) (96/52 - 156/69)  BP(mean): 62 (15 Nawaf 2020 06:00) (62 - 122)  ABP: --  ABP(mean): --  RR: 11 (15 Nawaf 2020 06:00) (10 - 35)  SpO2: 100% (15 Nawaf 2020 06:00) (98% - 100%)    Mode: AC/ CMV (Assist Control/ Continuous Mandatory Ventilation), RR (machine): 10, TV (machine): 470, FiO2: 40, PEEP: 5, ITime: 0.8, MAP: 8, PIP: 27    01-13 @ 07:01 - 01-14 @ 07:00  --------------------------------------------------------  IN: 2630.6 mL / OUT: 710 mL / NET: 1920.6 mL    01-14 @ 07:01 - 01-15 @ 06:40  --------------------------------------------------------  IN: 1499.8 mL / OUT: 930 mL / NET: 569.8 mL      CAPILLARY BLOOD GLUCOSE      POCT Blood Glucose.: 125 mg/dL (15 Nawaf 2020 05:57)      PHYSICAL EXAM:    General: NAD  HEENT: NC/AT; PERRL, clear conjunctiva  Neck: supple  Respiratory: CTA b/l  Cardiovascular: +S1/S2; RRR  Abdomen: soft, NT/ND; +BS x4  Extremities: WWP, 2+ peripheral pulses b/l; no LE edema  Skin: normal color and turgor; no rash  Neurological:     MEDICATIONS:  MEDICATIONS  (STANDING):  albuterol/ipratropium for Nebulization 3 milliLiter(s) Nebulizer every 4 hours  azithromycin   Tablet 250 milliGRAM(s) Oral daily  buDESOnide    Inhalation Suspension 0.5 milliGRAM(s) Inhalation every 12 hours  chlorhexidine 0.12% Liquid 15 milliLiter(s) Oral Mucosa every 12 hours  chlorhexidine 2% Cloths 1 Application(s) Topical daily  dexMEDEtomidine Infusion 0.2 MICROgram(s)/kG/Hr (2.5 mL/Hr) IV Continuous <Continuous>  dextrose 5%. 1000 milliLiter(s) (50 mL/Hr) IV Continuous <Continuous>  dextrose 50% Injectable 12.5 Gram(s) IV Push once  dextrose 50% Injectable 25 Gram(s) IV Push once  dextrose 50% Injectable 25 Gram(s) IV Push once  enoxaparin Injectable 30 milliGRAM(s) SubCutaneous every 24 hours  insulin lispro (HumaLOG) corrective regimen sliding scale   SubCutaneous every 6 hours  methylPREDNISolone sodium succinate Injectable 40 milliGRAM(s) IV Push daily  metoclopramide 5 milliGRAM(s) Oral every 8 hours  pantoprazole   Suspension 40 milliGRAM(s) Enteral Tube daily  petrolatum Ophthalmic Ointment 1 Application(s) Both EYES three times a day  polyethylene glycol 3350 17 Gram(s) Oral daily  propofol Infusion 5 MICROgram(s)/kG/Min (1.5 mL/Hr) IV Continuous <Continuous>    MEDICATIONS  (PRN):  dextrose 40% Gel 15 Gram(s) Oral once PRN Blood Glucose LESS THAN 70 milliGRAM(s)/deciliter  glucagon  Injectable 1 milliGRAM(s) IntraMuscular once PRN Glucose LESS THAN 70 milligrams/deciliter      ALLERGIES:  Allergies    No Known Allergies    Intolerances        LABS:                        10.4   6.48  )-----------( 164      ( 14 Jan 2020 06:15 )             32.7     01-14    138  |  98  |  20  ----------------------------<  157<H>  3.9   |  32<H>  |  0.82    Ca    8.6      14 Jan 2020 06:15  Phos  3.9     01-14  Mg     2.2     01-14            RADIOLOGY & ADDITIONAL TESTS: Reviewed. INTERVAL HPI/OVERNIGHT EVENTS: No events overnight. This morning patient was given a SBT, and we decided to extubate. Patient initially did not tolerate BIPAP, received some zofran for nausea, and is now calm, comfortable and tolerating BIPAP. Will send off ABG. Increased steroids to BID.     SUBJECTIVE: Patient seen and examined at bedside. On Bipap.     OBJECTIVE:    VITAL SIGNS:  ICU Vital Signs Last 24 Hrs  T(C): 37.7 (15 Nawaf 2020 00:00), Max: 37.9 (14 Jan 2020 09:30)  T(F): 99.9 (15 Nawaf 2020 00:00), Max: 100.3 (14 Jan 2020 09:30)  HR: 54 (15 Nawaf 2020 06:00) (54 - 86)  BP: 96/52 (15 Nawaf 2020 06:00) (96/52 - 156/69)  BP(mean): 62 (15 Nawaf 2020 06:00) (62 - 122)  ABP: --  ABP(mean): --  RR: 11 (15 Nawaf 2020 06:00) (10 - 35)  SpO2: 100% (15 Nawaf 2020 06:00) (98% - 100%)    Mode: AC/ CMV (Assist Control/ Continuous Mandatory Ventilation), RR (machine): 10, TV (machine): 470, FiO2: 40, PEEP: 5, ITime: 0.8, MAP: 8, PIP: 27    01-13 @ 07:01 - 01-14 @ 07:00  --------------------------------------------------------  IN: 2630.6 mL / OUT: 710 mL / NET: 1920.6 mL    01-14 @ 07:01 - 01-15 @ 06:40  --------------------------------------------------------  IN: 1499.8 mL / OUT: 930 mL / NET: 569.8 mL      CAPILLARY BLOOD GLUCOSE      POCT Blood Glucose.: 125 mg/dL (15 Nawaf 2020 05:57)      PHYSICAL EXAM:  Constitutional:  Thin gentleman on BIPAP resting comfortably in bed; NAD  Head: NC/AT  Eyes: PERRL, EOMI, anicteric sclera  ENT: no nasal discharge; uvula midline, no oropharyngeal erythema or exudates; MMM  Neck: supple; no JVD or thyromegaly  Respiratory: decreased breath sounds, poor respiratory effort B/L; no W/R/R, no retractions  Cardiac: +S1/S2; RRR; no M/R/G; PMI non-displaced  Gastrointestinal: abdomen soft, NT/ND; no rebound or guarding; +BSx4  Extremities: WWP, no clubbing or cyanosis; no peripheral edema  Musculoskeletal: unable to assess	  Vascular: 2+ radial, femoral, DP/PT pulses B/L  Dermatologic: skin warm, dry and intact; no rashes, wounds, or scars  Lymphatic: no submandibular or cervical LAD  Neurologic: Patient is soft spoken, gives thumbs up and responds with nodding of head.     MEDICATIONS:  MEDICATIONS  (STANDING):  albuterol/ipratropium for Nebulization 3 milliLiter(s) Nebulizer every 4 hours  azithromycin   Tablet 250 milliGRAM(s) Oral daily  buDESOnide    Inhalation Suspension 0.5 milliGRAM(s) Inhalation every 12 hours  chlorhexidine 0.12% Liquid 15 milliLiter(s) Oral Mucosa every 12 hours  chlorhexidine 2% Cloths 1 Application(s) Topical daily  dexMEDEtomidine Infusion 0.2 MICROgram(s)/kG/Hr (2.5 mL/Hr) IV Continuous <Continuous>  dextrose 5%. 1000 milliLiter(s) (50 mL/Hr) IV Continuous <Continuous>  dextrose 50% Injectable 12.5 Gram(s) IV Push once  dextrose 50% Injectable 25 Gram(s) IV Push once  dextrose 50% Injectable 25 Gram(s) IV Push once  enoxaparin Injectable 30 milliGRAM(s) SubCutaneous every 24 hours  insulin lispro (HumaLOG) corrective regimen sliding scale   SubCutaneous every 6 hours  methylPREDNISolone sodium succinate Injectable 40 milliGRAM(s) IV Push daily  metoclopramide 5 milliGRAM(s) Oral every 8 hours  pantoprazole   Suspension 40 milliGRAM(s) Enteral Tube daily  petrolatum Ophthalmic Ointment 1 Application(s) Both EYES three times a day  polyethylene glycol 3350 17 Gram(s) Oral daily  propofol Infusion 5 MICROgram(s)/kG/Min (1.5 mL/Hr) IV Continuous <Continuous>    MEDICATIONS  (PRN):  dextrose 40% Gel 15 Gram(s) Oral once PRN Blood Glucose LESS THAN 70 milliGRAM(s)/deciliter  glucagon  Injectable 1 milliGRAM(s) IntraMuscular once PRN Glucose LESS THAN 70 milligrams/deciliter      ALLERGIES:  Allergies    No Known Allergies    Intolerances        LABS:                        10.4   6.48  )-----------( 164      ( 14 Jan 2020 06:15 )             32.7     01-14    138  |  98  |  20  ----------------------------<  157<H>  3.9   |  32<H>  |  0.82    Ca    8.6      14 Jan 2020 06:15  Phos  3.9     01-14  Mg     2.2     01-14            RADIOLOGY & ADDITIONAL TESTS: Reviewed.

## 2020-01-15 NOTE — PROGRESS NOTE ADULT - ASSESSMENT
75 yo M, no pertinent PMHx, no allergies, no home medications BIBEMS for shortness of beath, found to be in hypercapnic respiratory failure 2/2 RSV in context of possible COPD and chronic smoking history.      PULMONARY:  #Acute hypercapnic respiratory failure 2/2 RSV  Patient presented SOB, wheezing, RSV+ in setting of possible COPD and chronic smoker.  s/p solumedrol 125mg, NO infiltrate seen on chest xray  - Patient has been extubated   - c/w solumedrol 40mg IVP to BID  - completed Azithromycin 250 for antiinflammatory affects  - c/w Duonebs    #possible COPD  Chest xray demonstrated enlarged lungs along with extensive smoking history  - c/w management as above    NEURO:  #Intubated  Patient biting on ET tube  - PATIENT HAS BEEN EXTUBATED  - c/w precedex    #AMS- despite sedation holiday, pt remains confused and has vertical gaze preference suspicious for brain stem pathology  -f/u CT head  -VEEG negative.   - Returning to baseline mental status    #R/o stroke- see plan above     HEMODYNAMICS:  #hypovolemic hyponatremia  -Na now 130  -LR @ 50cc/hr    ID:  #RSV positive  - management as above    #Metabolic acidosis  -with rising gap from 6-->17, since resolved  -lactate 5.6--> 2.1    CARDIOVASCULAR: DARIO    RENAL: DARIO    GI/FEN:  -Jevitty 1.5 @ 45ml/hr  -prostat    DISPO - continue intensive level of care in CCM/MICU service    LINES: peripheral access, holliday    PROPHYLACTIC MEASURE  F: NS 80cc/hr  E: replete K>4, Mg>2  N: NPO  VTE Prophylaxis: Lovenox 30mg  C: full code  D: MICU

## 2020-01-16 DIAGNOSIS — Z91.89 OTHER SPECIFIED PERSONAL RISK FACTORS, NOT ELSEWHERE CLASSIFIED: ICD-10-CM

## 2020-01-16 DIAGNOSIS — J96.02 ACUTE RESPIRATORY FAILURE WITH HYPERCAPNIA: ICD-10-CM

## 2020-01-16 DIAGNOSIS — D64.9 ANEMIA, UNSPECIFIED: ICD-10-CM

## 2020-01-16 DIAGNOSIS — R63.8 OTHER SYMPTOMS AND SIGNS CONCERNING FOOD AND FLUID INTAKE: ICD-10-CM

## 2020-01-16 DIAGNOSIS — J12.1 RESPIRATORY SYNCYTIAL VIRUS PNEUMONIA: ICD-10-CM

## 2020-01-16 LAB
ALBUMIN SERPL ELPH-MCNC: 3 G/DL — LOW (ref 3.3–5)
ALP SERPL-CCNC: 46 U/L — SIGNIFICANT CHANGE UP (ref 40–120)
ALT FLD-CCNC: 38 U/L — SIGNIFICANT CHANGE UP (ref 10–45)
ANION GAP SERPL CALC-SCNC: 10 MMOL/L — SIGNIFICANT CHANGE UP (ref 5–17)
AST SERPL-CCNC: 32 U/L — SIGNIFICANT CHANGE UP (ref 10–40)
BILIRUB SERPL-MCNC: 0.4 MG/DL — SIGNIFICANT CHANGE UP (ref 0.2–1.2)
BUN SERPL-MCNC: 20 MG/DL — SIGNIFICANT CHANGE UP (ref 7–23)
CALCIUM SERPL-MCNC: 8.9 MG/DL — SIGNIFICANT CHANGE UP (ref 8.4–10.5)
CHLORIDE SERPL-SCNC: 100 MMOL/L — SIGNIFICANT CHANGE UP (ref 96–108)
CO2 SERPL-SCNC: 30 MMOL/L — SIGNIFICANT CHANGE UP (ref 22–31)
CREAT SERPL-MCNC: 0.81 MG/DL — SIGNIFICANT CHANGE UP (ref 0.5–1.3)
GLUCOSE BLDC GLUCOMTR-MCNC: 102 MG/DL — HIGH (ref 70–99)
GLUCOSE BLDC GLUCOMTR-MCNC: 111 MG/DL — HIGH (ref 70–99)
GLUCOSE BLDC GLUCOMTR-MCNC: 162 MG/DL — HIGH (ref 70–99)
GLUCOSE BLDC GLUCOMTR-MCNC: 96 MG/DL — SIGNIFICANT CHANGE UP (ref 70–99)
GLUCOSE SERPL-MCNC: 107 MG/DL — HIGH (ref 70–99)
HCT VFR BLD CALC: 32.6 % — LOW (ref 39–50)
HGB BLD-MCNC: 10.4 G/DL — LOW (ref 13–17)
MCHC RBC-ENTMCNC: 30 PG — SIGNIFICANT CHANGE UP (ref 27–34)
MCHC RBC-ENTMCNC: 31.9 GM/DL — LOW (ref 32–36)
MCV RBC AUTO: 93.9 FL — SIGNIFICANT CHANGE UP (ref 80–100)
NRBC # BLD: 0 /100 WBCS — SIGNIFICANT CHANGE UP (ref 0–0)
PLATELET # BLD AUTO: 216 K/UL — SIGNIFICANT CHANGE UP (ref 150–400)
POTASSIUM SERPL-MCNC: 4.9 MMOL/L — SIGNIFICANT CHANGE UP (ref 3.5–5.3)
POTASSIUM SERPL-SCNC: 4.9 MMOL/L — SIGNIFICANT CHANGE UP (ref 3.5–5.3)
PROT SERPL-MCNC: 6 G/DL — SIGNIFICANT CHANGE UP (ref 6–8.3)
RBC # BLD: 3.47 M/UL — LOW (ref 4.2–5.8)
RBC # FLD: 14 % — SIGNIFICANT CHANGE UP (ref 10.3–14.5)
SODIUM SERPL-SCNC: 140 MMOL/L — SIGNIFICANT CHANGE UP (ref 135–145)
WBC # BLD: 8.29 K/UL — SIGNIFICANT CHANGE UP (ref 3.8–10.5)
WBC # FLD AUTO: 8.29 K/UL — SIGNIFICANT CHANGE UP (ref 3.8–10.5)

## 2020-01-16 PROCEDURE — 99233 SBSQ HOSP IP/OBS HIGH 50: CPT | Mod: GC

## 2020-01-16 RX ORDER — IPRATROPIUM/ALBUTEROL SULFATE 18-103MCG
3 AEROSOL WITH ADAPTER (GRAM) INHALATION ONCE
Refills: 0 | Status: COMPLETED | OUTPATIENT
Start: 2020-01-16 | End: 2020-01-16

## 2020-01-16 RX ORDER — PANTOPRAZOLE SODIUM 20 MG/1
40 TABLET, DELAYED RELEASE ORAL DAILY
Refills: 0 | Status: DISCONTINUED | OUTPATIENT
Start: 2020-01-17 | End: 2020-01-17

## 2020-01-16 RX ORDER — AMLODIPINE BESYLATE 2.5 MG/1
5 TABLET ORAL DAILY
Refills: 0 | Status: DISCONTINUED | OUTPATIENT
Start: 2020-01-16 | End: 2020-01-17

## 2020-01-16 RX ADMIN — Medication 0.5 MILLIGRAM(S): at 05:07

## 2020-01-16 RX ADMIN — Medication 3 MILLILITER(S): at 22:53

## 2020-01-16 RX ADMIN — DEXMEDETOMIDINE HYDROCHLORIDE IN 0.9% SODIUM CHLORIDE 2.5 MICROGRAM(S)/KG/HR: 4 INJECTION INTRAVENOUS at 02:26

## 2020-01-16 RX ADMIN — Medication 100 MILLIGRAM(S): at 18:54

## 2020-01-16 RX ADMIN — AMLODIPINE BESYLATE 5 MILLIGRAM(S): 2.5 TABLET ORAL at 18:54

## 2020-01-16 RX ADMIN — Medication 3 MILLILITER(S): at 13:30

## 2020-01-16 RX ADMIN — ENOXAPARIN SODIUM 30 MILLIGRAM(S): 100 INJECTION SUBCUTANEOUS at 18:27

## 2020-01-16 RX ADMIN — Medication 2: at 18:27

## 2020-01-16 RX ADMIN — Medication 3 MILLILITER(S): at 10:46

## 2020-01-16 RX ADMIN — Medication 40 MILLIGRAM(S): at 06:37

## 2020-01-16 RX ADMIN — Medication 0.5 MILLIGRAM(S): at 18:27

## 2020-01-16 RX ADMIN — Medication 3 MILLILITER(S): at 19:15

## 2020-01-16 RX ADMIN — CHLORHEXIDINE GLUCONATE 1 APPLICATION(S): 213 SOLUTION TOPICAL at 06:20

## 2020-01-16 RX ADMIN — Medication 1 APPLICATION(S): at 06:20

## 2020-01-16 RX ADMIN — Medication 0.5 MILLIGRAM(S): at 19:15

## 2020-01-16 RX ADMIN — Medication 0.5 MILLIGRAM(S): at 14:40

## 2020-01-16 RX ADMIN — Medication 1 APPLICATION(S): at 22:53

## 2020-01-16 RX ADMIN — Medication 3 MILLILITER(S): at 03:18

## 2020-01-16 RX ADMIN — Medication 3 MILLILITER(S): at 17:43

## 2020-01-16 RX ADMIN — Medication 3 MILLILITER(S): at 05:06

## 2020-01-16 RX ADMIN — Medication 1 APPLICATION(S): at 18:24

## 2020-01-16 NOTE — PROGRESS NOTE ADULT - ASSESSMENT
73 yo M, no pertinent PMHx, no allergies, no home medications BIBEMS for shortness of beath, found to be in hypercapnic respiratory failure 2/2 RSV in context of possible COPD and chronic smoking history.

## 2020-01-16 NOTE — PROVIDER CONTACT NOTE (CHANGE IN STATUS NOTIFICATION) - SITUATION
patient increasingly SOB, just received duoneb and budesonide treatment, hot and clammy, c/o feeling ot, restless in bed, showing work of breathing, saturating 85 on 4L NC. patient increasingly SOB, just received duoneb and budesonide treatment, hot and clammy, c/o feeling hot, restless in bed, showing work of breathing, saturating 85% on 4L NC.

## 2020-01-16 NOTE — PROVIDER CONTACT NOTE (CHANGE IN STATUS NOTIFICATION) - ASSESSMENT
patient restless in bed, wheezing on exam, showing work of breathing and accessory muscle use, endorsing feeling very hot, rectal temp 100.3, RR 30s, HR 120s, /144.

## 2020-01-16 NOTE — PROVIDER CONTACT NOTE (CHANGE IN STATUS NOTIFICATION) - BACKGROUND
patient admitted with resp distres,, int/ext, was on HFNC, currently on 4L NC humidifed, stepped down from MICU today. has received ativan IVP in the past for anxiety and resp distress.

## 2020-01-16 NOTE — PROGRESS NOTE ADULT - SUBJECTIVE AND OBJECTIVE BOX
INTERVAL HPI/OVERNIGHT EVENTS:    SUBJECTIVE: Patient seen and examined at bedside.     ROS:  CONSTITUTIONAL: No weakness, fevers or chills  EYES/ENT: No visual changes;  No vertigo or throat pain   NECK: No pain or stiffness  RESPIRATORY: No cough, wheezing, hemoptysis; No shortness of breath  CARDIOVASCULAR: No chest pain or palpitations  GASTROINTESTINAL: No abdominal or epigastric pain. No nausea, vomiting, or hematemesis; No diarrhea or constipation. No melena or hematochezia.  GENITOURINARY: No dysuria, frequency or hematuria  NEUROLOGICAL: No numbness or weakness  SKIN: No itching, rashes    OBJECTIVE:    VITAL SIGNS:  ICU Vital Signs Last 24 Hrs  T(C): 36.9 (16 Jan 2020 09:20), Max: 37.3 (15 Nawaf 2020 18:12)  T(F): 98.4 (16 Jan 2020 09:20), Max: 99.1 (15 Nawaf 2020 18:12)  HR: 60 (16 Jan 2020 10:00) (57 - 76)  BP: 143/72 (16 Jan 2020 10:00) (126/70 - 190/100)  BP(mean): 103 (16 Jan 2020 10:00) (89 - 137)  ABP: --  ABP(mean): --  RR: 19 (16 Jan 2020 10:00) (9 - 37)  SpO2: 98% (16 Jan 2020 10:00) (97% - 100%)        01-15 @ 07:01 - 01-16 @ 07:00  --------------------------------------------------------  IN: 152.1 mL / OUT: 635 mL / NET: -482.9 mL    01-16 @ 07:01  -  01-16 @ 12:56  --------------------------------------------------------  IN: 7.5 mL / OUT: 0 mL / NET: 7.5 mL      CAPILLARY BLOOD GLUCOSE      POCT Blood Glucose.: 96 mg/dL (16 Jan 2020 11:35)      PHYSICAL EXAM:    General: NAD  HEENT: NC/AT; PERRL, clear conjunctiva  Neck: supple  Respiratory: CTA b/l  Cardiovascular: +S1/S2; RRR  Abdomen: soft, NT/ND; +BS x4  Extremities: WWP, 2+ peripheral pulses b/l; no LE edema  Skin: normal color and turgor; no rash  Neurological:     MEDICATIONS:  MEDICATIONS  (STANDING):  albuterol/ipratropium for Nebulization 3 milliLiter(s) Nebulizer every 4 hours  buDESOnide    Inhalation Suspension 0.5 milliGRAM(s) Inhalation every 12 hours  chlorhexidine 2% Cloths 1 Application(s) Topical daily  dextrose 5%. 1000 milliLiter(s) (50 mL/Hr) IV Continuous <Continuous>  dextrose 50% Injectable 12.5 Gram(s) IV Push once  dextrose 50% Injectable 25 Gram(s) IV Push once  dextrose 50% Injectable 25 Gram(s) IV Push once  enoxaparin Injectable 30 milliGRAM(s) SubCutaneous every 24 hours  insulin lispro (HumaLOG) corrective regimen sliding scale   SubCutaneous every 6 hours  petrolatum Ophthalmic Ointment 1 Application(s) Both EYES three times a day    MEDICATIONS  (PRN):  dextrose 40% Gel 15 Gram(s) Oral once PRN Blood Glucose LESS THAN 70 milliGRAM(s)/deciliter  glucagon  Injectable 1 milliGRAM(s) IntraMuscular once PRN Glucose LESS THAN 70 milligrams/deciliter      ALLERGIES:  Allergies    No Known Allergies    Intolerances        LABS:                        10.4   8.29  )-----------( 216      ( 16 Jan 2020 07:14 )             32.6     01-16    140  |  100  |  20  ----------------------------<  107<H>  4.9   |  30  |  0.81    Ca    8.9      16 Jan 2020 07:14  Phos  3.2     01-15  Mg     2.3     01-15    TPro  6.0  /  Alb  3.0<L>  /  TBili  0.4  /  DBili  x   /  AST  32  /  ALT  38  /  AlkPhos  46  01-16          RADIOLOGY & ADDITIONAL TESTS: Reviewed. INTERVAL HPI/OVERNIGHT EVENTS: ON: passed TOV. Pt found to be taking deep breaths with inc RR. Ativan given and switched to bipap. Nurse noted that pt was gagging, so was switched back to HFNC. Pt comfortable and speaking with staff. 1/16: Discontinued Precedex will use PRN ativan for anxiety.  Stoped azithromycin. Pending Speech and swallow eval. Then switch meds to PO. COPD protocol for PT ordered. Sat goal of >88%. Step down to 7 La.    SUBJECTIVE: Patient seen and examined at bedside. Patient is speaking and breathing more comfortably, still with some accessory muscle use.     OBJECTIVE:    VITAL SIGNS:  ICU Vital Signs Last 24 Hrs  T(C): 36.9 (16 Jan 2020 09:20), Max: 37.3 (15 Nawaf 2020 18:12)  T(F): 98.4 (16 Jan 2020 09:20), Max: 99.1 (15 Nawaf 2020 18:12)  HR: 60 (16 Jan 2020 10:00) (57 - 76)  BP: 143/72 (16 Jan 2020 10:00) (126/70 - 190/100)  BP(mean): 103 (16 Jan 2020 10:00) (89 - 137)  ABP: --  ABP(mean): --  RR: 19 (16 Jan 2020 10:00) (9 - 37)  SpO2: 98% (16 Jan 2020 10:00) (97% - 100%)        01-15 @ 07:01 - 01-16 @ 07:00  --------------------------------------------------------  IN: 152.1 mL / OUT: 635 mL / NET: -482.9 mL    01-16 @ 07:01  -  01-16 @ 12:56  --------------------------------------------------------  IN: 7.5 mL / OUT: 0 mL / NET: 7.5 mL      CAPILLARY BLOOD GLUCOSE      POCT Blood Glucose.: 96 mg/dL (16 Jan 2020 11:35)      PHYSICAL EXAM:    Constitutional:  Thin gentleman on high flow resting comfortably in bed; NAD  Head: NC/AT  Eyes: PERRL, EOMI, anicteric sclera  ENT: no nasal discharge; uvula midline, no oropharyngeal erythema or exudates; MMM  Neck: supple; no JVD or thyromegaly  Respiratory: decreased breath sounds, poor respiratory effort B/L; no W/R/R, no retractions  Cardiac: +S1/S2; RRR; no M/R/G; PMI non-displaced  Gastrointestinal: abdomen soft, NT/ND; no rebound or guarding; +BSx4  Extremities: WWP, no clubbing or cyanosis; no peripheral edema  Musculoskeletal: unable to assess	  Vascular: 2+ radial, femoral, DP/PT pulses B/L  Dermatologic: skin warm, dry and intact; no rashes, wounds, or scars  Lymphatic: no submandibular or cervical LAD  Neurologic: Patient is soft spoken, AAOx3       MEDICATIONS:  MEDICATIONS  (STANDING):  albuterol/ipratropium for Nebulization 3 milliLiter(s) Nebulizer every 4 hours  buDESOnide    Inhalation Suspension 0.5 milliGRAM(s) Inhalation every 12 hours  chlorhexidine 2% Cloths 1 Application(s) Topical daily  dextrose 5%. 1000 milliLiter(s) (50 mL/Hr) IV Continuous <Continuous>  dextrose 50% Injectable 12.5 Gram(s) IV Push once  dextrose 50% Injectable 25 Gram(s) IV Push once  dextrose 50% Injectable 25 Gram(s) IV Push once  enoxaparin Injectable 30 milliGRAM(s) SubCutaneous every 24 hours  insulin lispro (HumaLOG) corrective regimen sliding scale   SubCutaneous every 6 hours  petrolatum Ophthalmic Ointment 1 Application(s) Both EYES three times a day    MEDICATIONS  (PRN):  dextrose 40% Gel 15 Gram(s) Oral once PRN Blood Glucose LESS THAN 70 milliGRAM(s)/deciliter  glucagon  Injectable 1 milliGRAM(s) IntraMuscular once PRN Glucose LESS THAN 70 milligrams/deciliter      ALLERGIES:  Allergies    No Known Allergies    Intolerances        LABS:                        10.4   8.29  )-----------( 216      ( 16 Jan 2020 07:14 )             32.6     01-16    140  |  100  |  20  ----------------------------<  107<H>  4.9   |  30  |  0.81    Ca    8.9      16 Jan 2020 07:14  Phos  3.2     01-15  Mg     2.3     01-15    TPro  6.0  /  Alb  3.0<L>  /  TBili  0.4  /  DBili  x   /  AST  32  /  ALT  38  /  AlkPhos  46  01-16          RADIOLOGY & ADDITIONAL TESTS: Reviewed. ******Transfer [facility] to [facility]:    Hospital course:  75 yo M, no pertinent PMHx, no allergies, no home medications BIBEMS c/o SOB, wheezing, difficulty breathing in the setting of flu-like symptoms x 5 days. Patient found to have hypercapnic respiratory failure 2/2 RSV in context of possible hx of COPD and chronic smoking history. Pt c/o cough, body aches. Per wife pt is not on anti-coagulants, beta-blockers, anti-hypertensive drugs. Pt smokes marijuana regularly. Pt states he has not seen a PMD in years. Pt's wife admits that he used a vaporizer for marijuana once recently, but has been using a vicks vaporizer since onset of symptoms. The patient was intubated and sedated and placed on azithromycin for antiinflammatory effects, solumedrol BID, and duonebs. Patient failed multiple SBT but was eventually extubated on 1/15. Patient was initially tachypneic and using significant accessory muscles while on BIPAP, but was slowly weaned to high flow nasal cannula, Patient received multiple doses of ativan PRN to help with anxiety regarding breathing, which helped patient with his breathing. Patient passed trial of void and is hemodynamically stable for step down to 7 lach.       INTERVAL HPI/OVERNIGHT EVENTS: ON: passed TOV. Pt found to be taking deep breaths with inc RR. Ativan given and switched to bipap. Nurse noted that pt was gagging, so was switched back to HFNC. Pt comfortable and speaking with staff. 1/16: Discontinued Precedex will use PRN ativan for anxiety.  Stoped azithromycin. Pending Speech and swallow eval. Then switch meds to PO. COPD protocol for PT ordered. Sat goal of >88%. Step down to 7 La.    SUBJECTIVE: Patient seen and examined at bedside. Patient is speaking and breathing more comfortably, still with some accessory muscle use.     OBJECTIVE:    VITAL SIGNS:  ICU Vital Signs Last 24 Hrs  T(C): 36.9 (16 Jan 2020 09:20), Max: 37.3 (15 Nawaf 2020 18:12)  T(F): 98.4 (16 Jan 2020 09:20), Max: 99.1 (15 Nawaf 2020 18:12)  HR: 60 (16 Jan 2020 10:00) (57 - 76)  BP: 143/72 (16 Jan 2020 10:00) (126/70 - 190/100)  BP(mean): 103 (16 Jan 2020 10:00) (89 - 137)  ABP: --  ABP(mean): --  RR: 19 (16 Jan 2020 10:00) (9 - 37)  SpO2: 98% (16 Jan 2020 10:00) (97% - 100%)        01-15 @ 07:01  -  01-16 @ 07:00  --------------------------------------------------------  IN: 152.1 mL / OUT: 635 mL / NET: -482.9 mL    01-16 @ 07:01  -  01-16 @ 12:56  --------------------------------------------------------  IN: 7.5 mL / OUT: 0 mL / NET: 7.5 mL      CAPILLARY BLOOD GLUCOSE      POCT Blood Glucose.: 96 mg/dL (16 Jan 2020 11:35)      PHYSICAL EXAM:    Constitutional:  Thin gentleman on high flow resting comfortably in bed; NAD  Head: NC/AT  Eyes: PERRL, EOMI, anicteric sclera  ENT: no nasal discharge; uvula midline, no oropharyngeal erythema or exudates; MMM  Neck: supple; no JVD or thyromegaly  Respiratory: decreased breath sounds, poor respiratory effort B/L; no W/R/R, no retractions  Cardiac: +S1/S2; RRR; no M/R/G; PMI non-displaced  Gastrointestinal: abdomen soft, NT/ND; no rebound or guarding; +BSx4  Extremities: WWP, no clubbing or cyanosis; no peripheral edema  Musculoskeletal: unable to assess	  Vascular: 2+ radial, femoral, DP/PT pulses B/L  Dermatologic: skin warm, dry and intact; no rashes, wounds, or scars  Lymphatic: no submandibular or cervical LAD  Neurologic: Patient is soft spoken, AAOx3       MEDICATIONS:  MEDICATIONS  (STANDING):  albuterol/ipratropium for Nebulization 3 milliLiter(s) Nebulizer every 4 hours  buDESOnide    Inhalation Suspension 0.5 milliGRAM(s) Inhalation every 12 hours  chlorhexidine 2% Cloths 1 Application(s) Topical daily  dextrose 5%. 1000 milliLiter(s) (50 mL/Hr) IV Continuous <Continuous>  dextrose 50% Injectable 12.5 Gram(s) IV Push once  dextrose 50% Injectable 25 Gram(s) IV Push once  dextrose 50% Injectable 25 Gram(s) IV Push once  enoxaparin Injectable 30 milliGRAM(s) SubCutaneous every 24 hours  insulin lispro (HumaLOG) corrective regimen sliding scale   SubCutaneous every 6 hours  petrolatum Ophthalmic Ointment 1 Application(s) Both EYES three times a day    MEDICATIONS  (PRN):  dextrose 40% Gel 15 Gram(s) Oral once PRN Blood Glucose LESS THAN 70 milliGRAM(s)/deciliter  glucagon  Injectable 1 milliGRAM(s) IntraMuscular once PRN Glucose LESS THAN 70 milligrams/deciliter      ALLERGIES:  Allergies    No Known Allergies    Intolerances        LABS:                        10.4   8.29  )-----------( 216      ( 16 Jan 2020 07:14 )             32.6     01-16    140  |  100  |  20  ----------------------------<  107<H>  4.9   |  30  |  0.81    Ca    8.9      16 Jan 2020 07:14  Phos  3.2     01-15  Mg     2.3     01-15    TPro  6.0  /  Alb  3.0<L>  /  TBili  0.4  /  DBili  x   /  AST  32  /  ALT  38  /  AlkPhos  46  01-16          RADIOLOGY & ADDITIONAL TESTS: Reviewed.

## 2020-01-16 NOTE — PROGRESS NOTE ADULT - SUBJECTIVE AND OBJECTIVE BOX
TRANSFER FROM MICU TO [address] [place] [address] COURSE: TRANSFER FROM MICU TO [address] [place] [address] COURSE:  73 yo M, no pertinent PMHx, no allergies, no home medications BIBEMS c/o SOB, wheezing, difficulty breathing in the setting of flu-like symptoms, cough and body aches x 5 days. Patient found to have hypercapnic respiratory failure 2/2 RSV in context of possible hx of COPD and chronic tobacco smoking history. Has recent marijuana vaping history. The patient was intubated, and treated with azithromycin for antiinflammatory effects, solumedrol BID, and duonebs. Patient failed multiple SBT but was eventually extubated on 1/15. Patient was initially tachypneic and using significant accessory muscles while on BIPAP, but was slowly weaned to high flow nasal cannula, now on 4LNC. Patient received multiple doses of ativan PRN to help with anxiety regarding breathing, which helped patient with his respiratory status. Patient passed trial of void and is hemodynamically stable for step down to [facility].         SUBJECTIVE / INTERVAL HPI: Patient seen and examined at bedside. On 4LNC, receiving a duoneb treatment. Has some increased WOB with dry cough but speaking in full sentences, pleasant. Denies f/c, n/v, HA, chest pain, abdominal pain, diarrhea, constipation, dysuria.    MEDICATIONS  (STANDING):  albuterol/ipratropium for Nebulization 3 milliLiter(s) Nebulizer every 4 hours  buDESOnide    Inhalation Suspension 0.5 milliGRAM(s) Inhalation every 12 hours  chlorhexidine 2% Cloths 1 Application(s) Topical daily  dextrose 5%. 1000 milliLiter(s) (50 mL/Hr) IV Continuous <Continuous>  dextrose 50% Injectable 12.5 Gram(s) IV Push once  dextrose 50% Injectable 25 Gram(s) IV Push once  dextrose 50% Injectable 25 Gram(s) IV Push once  enoxaparin Injectable 30 milliGRAM(s) SubCutaneous every 24 hours  insulin lispro (HumaLOG) corrective regimen sliding scale   SubCutaneous every 6 hours  petrolatum Ophthalmic Ointment 1 Application(s) Both EYES three times a day    MEDICATIONS  (PRN):  dextrose 40% Gel 15 Gram(s) Oral once PRN Blood Glucose LESS THAN 70 milliGRAM(s)/deciliter  glucagon  Injectable 1 milliGRAM(s) IntraMuscular once PRN Glucose LESS THAN 70 milligrams/deciliter    Allergies    No Known Allergies    Intolerances        VITAL SIGNS:  Vital Signs Last 24 Hrs  T(C): 36.6 (16 Jan 2020 13:52), Max: 37.3 (15 Nawaf 2020 18:12)  T(F): 97.9 (16 Jan 2020 13:52), Max: 99.1 (15 Nawaf 2020 18:12)  HR: 94 (16 Jan 2020 14:48) (57 - 107)  BP: 187/76 (16 Jan 2020 14:48) (126/70 - 236/107)  BP(mean): 123 (16 Jan 2020 14:48) (89 - 153)  RR: 22 (16 Jan 2020 14:48) (9 - 37)  SpO2: 92% (16 Jan 2020 14:48) (88% - 100%)      01-15-20 @ 07:01  -  01-16-20 @ 07:00  --------------------------------------------------------  IN: 152.1 mL / OUT: 635 mL / NET: -482.9 mL    01-16-20 @ 07:01  -  01-16-20 @ 15:32  --------------------------------------------------------  IN: 7.5 mL / OUT: 0 mL / NET: 7.5 mL        PHYSICAL EXAM:  General: NAD, with some increased WOB, on 4LNC  HEENT: NC/AT, anicteric sclera, MMM  Neck: supple  Cardiovascular: +S1/S2, RRR, No murmurs, rubs, gallops  Respiratory: Poor air entry, mild expiratory wheeze greatest in b/l apices  Gastrointestinal: soft, NT/ND, +BSx4  Extremities: WWP, no edema, clubbing or cyanosis  Vascular: 2+ radial, DP/PT pulses B/L  Neurological: AAOx3, no focal deficits      LABS:                        10.4   8.29  )-----------( 216      ( 16 Jan 2020 07:14 )             32.6     01-16    140  |  100  |  20  ----------------------------<  107<H>  4.9   |  30  |  0.81    Ca    8.9      16 Jan 2020 07:14  Phos  3.2     01-15  Mg     2.3     01-15    TPro  6.0  /  Alb  3.0<L>  /  TBili  0.4  /  DBili  x   /  AST  32  /  ALT  38  /  AlkPhos  46  01-16        CAPILLARY BLOOD GLUCOSE  POCT Blood Glucose.: 96 mg/dL (16 Jan 2020 11:35)  POCT Blood Glucose.: 102 mg/dL (16 Jan 2020 07:04)  POCT Blood Glucose.: 111 mg/dL (16 Jan 2020 00:14)  POCT Blood Glucose.: 119 mg/dL (15 Nawaf 2020 16:57)          RADIOLOGY & ADDITIONAL TESTS: Reviewed.

## 2020-01-16 NOTE — PROGRESS NOTE ADULT - PROBLEM SELECTOR PLAN 5
F: none  E: replete prn  N: Regular diet with Ensure Enlive BID    DVT ppx: lovenox 30mg daily  GI ppx: pantoprazole 40mg daily while on prednisone  Code: FULL  Dispo: [facility]

## 2020-01-16 NOTE — PROGRESS NOTE ADULT - PROBLEM SELECTOR PLAN 4
Hgb 15 on admission now 10.4, normocytic. No active sign of bleed on exam. Likely 2/2 poor nutritional intake in setting of hospitalization for severe acute illness.  - Monitor daily CBC

## 2020-01-16 NOTE — PROGRESS NOTE ADULT - ASSESSMENT
73 yo M, no pertinent PMHx, no allergies, no home medications BIBEMS for shortness of beath, found to be in hypercapnic respiratory failure 2/2 RSV in context of possible COPD and chronic smoking history.      PULMONARY:  #Acute hypercapnic respiratory failure 2/2 RSV  Patient presented SOB, wheezing, RSV+ in setting of possible COPD and chronic smoker.  s/p solumedrol 125mg, NO infiltrate seen on chest xray  - Patient has been extubated   - c/w solumedrol switch to oral steroids qd  - completed Azithromycin 250 for antiinflammatory affects  - c/w Duonebs anjelica, laba, ICS    #possible COPD  Chest xray demonstrated enlarged lungs along with extensive smoking history  - c/w management as above    NEURO:  #Intubated  Patient biting on ET tube  - PATIENT HAS BEEN EXTUBATED  - c/w precedex    #AMS- despite sedation holiday, pt remains confused and has vertical gaze preference suspicious for brain stem pathology  -f/u CT head  -VEEG negative.   - Returning to baseline mental status    #R/o stroke- RESOLVED    HEMODYNAMICS:  #hypovolemic hyponatremia  -Resolved    ID:  #RSV positive  - management as above    #Metabolic acidosis  -with rising gap from 6-->17, since resolved  -lactate 5.6--> 2.1    CARDIOVASCULAR: DARIO    RENAL: DARIO    GI/FEN:  -f/u with speach and swallow assessment    DISPO - continue intensive level of care in CCM/MICU service    LINES: peripheral access, holliday    PROPHYLACTIC MEASURE  F: NS 80cc/hr  E: replete K>4, Mg>2  N: NPO  VTE Prophylaxis: Lovenox 30mg  C: full code  D: MICU

## 2020-01-17 DIAGNOSIS — J44.1 CHRONIC OBSTRUCTIVE PULMONARY DISEASE WITH (ACUTE) EXACERBATION: ICD-10-CM

## 2020-01-17 LAB
ANION GAP SERPL CALC-SCNC: 15 MMOL/L — SIGNIFICANT CHANGE UP (ref 5–17)
BUN SERPL-MCNC: 17 MG/DL — SIGNIFICANT CHANGE UP (ref 7–23)
CALCIUM SERPL-MCNC: 9 MG/DL — SIGNIFICANT CHANGE UP (ref 8.4–10.5)
CHLORIDE SERPL-SCNC: 98 MMOL/L — SIGNIFICANT CHANGE UP (ref 96–108)
CO2 SERPL-SCNC: 31 MMOL/L — SIGNIFICANT CHANGE UP (ref 22–31)
CREAT SERPL-MCNC: 0.78 MG/DL — SIGNIFICANT CHANGE UP (ref 0.5–1.3)
GLUCOSE BLDC GLUCOMTR-MCNC: 139 MG/DL — HIGH (ref 70–99)
GLUCOSE BLDC GLUCOMTR-MCNC: 73 MG/DL — SIGNIFICANT CHANGE UP (ref 70–99)
GLUCOSE BLDC GLUCOMTR-MCNC: 77 MG/DL — SIGNIFICANT CHANGE UP (ref 70–99)
GLUCOSE BLDC GLUCOMTR-MCNC: 78 MG/DL — SIGNIFICANT CHANGE UP (ref 70–99)
GLUCOSE BLDC GLUCOMTR-MCNC: 82 MG/DL — SIGNIFICANT CHANGE UP (ref 70–99)
GLUCOSE BLDC GLUCOMTR-MCNC: 86 MG/DL — SIGNIFICANT CHANGE UP (ref 70–99)
GLUCOSE SERPL-MCNC: 83 MG/DL — SIGNIFICANT CHANGE UP (ref 70–99)
HCT VFR BLD CALC: 37.2 % — LOW (ref 39–50)
HGB BLD-MCNC: 11.4 G/DL — LOW (ref 13–17)
MAGNESIUM SERPL-MCNC: 2 MG/DL — SIGNIFICANT CHANGE UP (ref 1.6–2.6)
MCHC RBC-ENTMCNC: 29.2 PG — SIGNIFICANT CHANGE UP (ref 27–34)
MCHC RBC-ENTMCNC: 30.6 GM/DL — LOW (ref 32–36)
MCV RBC AUTO: 95.1 FL — SIGNIFICANT CHANGE UP (ref 80–100)
NRBC # BLD: 0 /100 WBCS — SIGNIFICANT CHANGE UP (ref 0–0)
PLATELET # BLD AUTO: 313 K/UL — SIGNIFICANT CHANGE UP (ref 150–400)
POTASSIUM SERPL-MCNC: 3.5 MMOL/L — SIGNIFICANT CHANGE UP (ref 3.5–5.3)
POTASSIUM SERPL-SCNC: 3.5 MMOL/L — SIGNIFICANT CHANGE UP (ref 3.5–5.3)
RBC # BLD: 3.91 M/UL — LOW (ref 4.2–5.8)
RBC # FLD: 13.7 % — SIGNIFICANT CHANGE UP (ref 10.3–14.5)
SODIUM SERPL-SCNC: 144 MMOL/L — SIGNIFICANT CHANGE UP (ref 135–145)
WBC # BLD: 11.94 K/UL — HIGH (ref 3.8–10.5)
WBC # FLD AUTO: 11.94 K/UL — HIGH (ref 3.8–10.5)

## 2020-01-17 PROCEDURE — 99233 SBSQ HOSP IP/OBS HIGH 50: CPT | Mod: GC

## 2020-01-17 PROCEDURE — 71045 X-RAY EXAM CHEST 1 VIEW: CPT | Mod: 26

## 2020-01-17 RX ORDER — AMLODIPINE BESYLATE 2.5 MG/1
10 TABLET ORAL DAILY
Refills: 0 | Status: DISCONTINUED | OUTPATIENT
Start: 2020-01-18 | End: 2020-01-21

## 2020-01-17 RX ORDER — PANTOPRAZOLE SODIUM 20 MG/1
40 TABLET, DELAYED RELEASE ORAL
Refills: 0 | Status: DISCONTINUED | OUTPATIENT
Start: 2020-01-17 | End: 2020-01-21

## 2020-01-17 RX ORDER — AMLODIPINE BESYLATE 2.5 MG/1
5 TABLET ORAL ONCE
Refills: 0 | Status: COMPLETED | OUTPATIENT
Start: 2020-01-17 | End: 2020-01-17

## 2020-01-17 RX ORDER — POTASSIUM CHLORIDE 20 MEQ
20 PACKET (EA) ORAL ONCE
Refills: 0 | Status: COMPLETED | OUTPATIENT
Start: 2020-01-17 | End: 2020-01-17

## 2020-01-17 RX ADMIN — Medication 20 MILLIEQUIVALENT(S): at 11:22

## 2020-01-17 RX ADMIN — Medication 3 MILLILITER(S): at 22:51

## 2020-01-17 RX ADMIN — PANTOPRAZOLE SODIUM 40 MILLIGRAM(S): 20 TABLET, DELAYED RELEASE ORAL at 11:58

## 2020-01-17 RX ADMIN — Medication 1 APPLICATION(S): at 23:47

## 2020-01-17 RX ADMIN — Medication 3 MILLILITER(S): at 02:00

## 2020-01-17 RX ADMIN — Medication 3 MILLILITER(S): at 17:25

## 2020-01-17 RX ADMIN — Medication 3 MILLILITER(S): at 10:45

## 2020-01-17 RX ADMIN — ENOXAPARIN SODIUM 30 MILLIGRAM(S): 100 INJECTION SUBCUTANEOUS at 17:26

## 2020-01-17 RX ADMIN — Medication 0.5 MILLIGRAM(S): at 17:25

## 2020-01-17 RX ADMIN — Medication 1 APPLICATION(S): at 06:18

## 2020-01-17 RX ADMIN — Medication 0.5 MILLIGRAM(S): at 06:18

## 2020-01-17 RX ADMIN — AMLODIPINE BESYLATE 5 MILLIGRAM(S): 2.5 TABLET ORAL at 11:22

## 2020-01-17 RX ADMIN — Medication 3 MILLILITER(S): at 06:18

## 2020-01-17 RX ADMIN — Medication 40 MILLIGRAM(S): at 06:18

## 2020-01-17 RX ADMIN — AMLODIPINE BESYLATE 5 MILLIGRAM(S): 2.5 TABLET ORAL at 06:26

## 2020-01-17 RX ADMIN — Medication 3 MILLILITER(S): at 14:53

## 2020-01-17 NOTE — PROGRESS NOTE ADULT - PROBLEM SELECTOR PLAN 5
F: none  E: replete prn  N: Regular mechanical soft thin liquid diet with Ensure Enlive BID    DVT ppx: lovenox 30mg daily  GI ppx: pantoprazole 40mg daily while on prednisone  Code: FULL  Dispo: [facility]

## 2020-01-17 NOTE — PROGRESS NOTE ADULT - ASSESSMENT
73 yo M, no pertinent PMHx, no allergies, no home medications initially admitted for hypercapneic and hypoxemic respiratory failure attributed to COPD exacerbation in the setting of RSV infections. He was extubated on 1/14 and has now been transferred to the step-down unit. Consult for COPD management

## 2020-01-17 NOTE — CONSULT NOTE ADULT - ASSESSMENT
per MICU    73 yo M, no pertinent PMHx, no allergies, no home medications BIBEMS for shortness of beath, found to be in hypercapnic respiratory failure 2/2 RSV in context of possible COPD and chronic smoking history.    Problem/Plan - 1:  ·  Problem: Acute hypercapnic respiratory failure.  Plan: Patient presented SOB, wheezing, RSV+ in setting of possible COPD and chronic smoker. S/p solumedrol 125mg. No infiltrate seen on chest xray. Intubated 1/9, successfully extubated 1/15. Now weaned to 4LNC.  - Switched solumedrol to prednisone 40mg daily  - completed Azithromycin 250 for antiinflammatory affects  - c/w Duonebs q4hr standing  - Pulm consulted for evaluation for obstructive lung disease  - Ativan 0.5mg prn for positive cycle of tachypnea and anxiety.     Problem/Plan - 2:  ·  Problem: RSV (respiratory syncytial virus pneumonia).  Plan: Patient presented SOB, wheezing, RSV+ in setting of possible COPD and chronic smoker. Intubated for acute hypercapnic respiratory failure likely 2/2 COPD exacerbation 2/2 RSV infection.  - Treatment for respiratory failure as above  - Supportive treatment prn.     Problem/Plan - 3:  ·  Problem: R/O Chronic obstructive pulmonary disease, unspecified COPD type.  Plan: Patient presented SOB, wheezing, RSV+, intubated for acute hypercapnic respiratory failure. Chest xray demonstrated enlarged lungs along with extensive smoking history.  - Pulm consulted for evaluation for obstructive lung disease in setting of severe hypercapnic respiratory failure requiring intubation  - Switched solumedrol to prednisone 40mg daily  - Completed Azithromycin 250 for antiinflammatory affects  - c/w Duonebs q4hr standing  - C/w budesonide 0.5mg BID.     Problem/Plan - 4:  ·  Problem: Anemia, unspecified type.  Plan: Hgb 15 on admission now 10.4, normocytic. No active sign of bleed on exam. Likely 2/2 poor nutritional intake in setting of hospitalization for severe acute illness.  - Monitor daily CBC.     Problem/Plan - 5:  ·  Problem: Nutrition, metabolism, and development symptoms.  Plan: F: none  E: replete prn  N: Regular diet with Ensure Enlive BID    DVT ppx: lovenox 30mg daily  GI ppx: pantoprazole 40mg daily while on prednisone  Code: FULL  Dispo: [facility].

## 2020-01-17 NOTE — CONSULT NOTE ADULT - SUBJECTIVE AND OBJECTIVE BOX
Patient is a 74y old  Male who presents with a chief complaint of Hypercapnic respiratory failure 2/2 to RSV (17 Jan 2020 08:32)       HPI:  73 yo M, no pertinent PMHx, no allergies, no home medications BIBEMS c/o SOB, wheezing, difficulty breathing in the setting of flu-like symptoms x 5 days. Pt c/o cough, body aches. Per wife pt is not on anti-coagulants, beta-blockers, anti-hypertensive drugs. Pt smokes marijuana regularly. Pt states he has not seen a PMD in years. Pt's wife admits that he used a vaporizer for marijuana once recently, but has been using a vicks vaporizer since onset of symptoms. Patient intubated and admitted to the MICU for Hypercapnic respiratory failure due to RSV pneumonia, s/p trial of BIPAP with improvement in hypoxia but not ventilation, requiring intubation for impending respiratory failure, labs noted with hyponatremia, mild, likely due to volume depletion/sepsis. Given IV abx and sedatives in ED. (09 Jan 2020 23:00)      PAST MEDICAL & SURGICAL HISTORY:  No pertinent past medical history  No significant past surgical history      MEDICATIONS  (STANDING):  albuterol/ipratropium for Nebulization 3 milliLiter(s) Nebulizer every 4 hours  amLODIPine   Tablet 5 milliGRAM(s) Oral daily  buDESOnide    Inhalation Suspension 0.5 milliGRAM(s) Inhalation every 12 hours  dextrose 5%. 1000 milliLiter(s) (50 mL/Hr) IV Continuous <Continuous>  dextrose 50% Injectable 12.5 Gram(s) IV Push once  dextrose 50% Injectable 25 Gram(s) IV Push once  dextrose 50% Injectable 25 Gram(s) IV Push once  enoxaparin Injectable 30 milliGRAM(s) SubCutaneous every 24 hours  insulin lispro (HumaLOG) corrective regimen sliding scale   SubCutaneous every 6 hours  pantoprazole   Suspension 40 milliGRAM(s) Oral daily  petrolatum Ophthalmic Ointment 1 Application(s) Both EYES three times a day  predniSONE   Tablet 40 milliGRAM(s) Oral daily    MEDICATIONS  (PRN):  dextrose 40% Gel 15 Gram(s) Oral once PRN Blood Glucose LESS THAN 70 milliGRAM(s)/deciliter  glucagon  Injectable 1 milliGRAM(s) IntraMuscular once PRN Glucose LESS THAN 70 milligrams/deciliter  guaiFENesin   Syrup  (Sugar-Free) 100 milliGRAM(s) Oral every 6 hours PRN Cough  LORazepam   Injectable 0.5 milliGRAM(s) IV Push every 4 hours PRN Anxiety      Social History:           -  Occupation: X           -  Home Living Status: lives with his wife in a first floor apartment           -  Prior Home Care Services:  X    Functional Level Prior to Admission:           - ADL's:  fully independent           - ambulates without assistive devices    FAMILY HISTORY:      CBC Full  -  ( 17 Jan 2020 08:04 )  WBC Count : 11.94 K/uL  RBC Count : 3.91 M/uL  Hemoglobin : 11.4 g/dL  Hematocrit : 37.2 %  Platelet Count - Automated : 313 K/uL  Mean Cell Volume : 95.1 fl  Mean Cell Hemoglobin : 29.2 pg  Mean Cell Hemoglobin Concentration : 30.6 gm/dL  Auto Neutrophil # : x  Auto Lymphocyte # : x  Auto Monocyte # : x  Auto Eosinophil # : x  Auto Basophil # : x  Auto Neutrophil % : x  Auto Lymphocyte % : x  Auto Monocyte % : x  Auto Eosinophil % : x  Auto Basophil % : x      01-17    144  |  98  |  17  ----------------------------<  83  3.5   |  31  |  0.78    Ca    9.0      17 Jan 2020 08:03  Mg     2.0     01-17    TPro  6.0  /  Alb  3.0<L>  /  TBili  0.4  /  DBili  x   /  AST  32  /  ALT  38  /  AlkPhos  46  01-16            Radiology:    < from: Xray Chest 1 View- PORTABLE-Routine (01.15.20 @ 07:32) >  EXAM:  XR CHEST PORTABLE ROUTINE 1V                          PROCEDURE DATE:  01/15/2020          INTERPRETATION:  Clinical history/reason for exam: Line placement.    Single frontal view excluding the right costophrenic angle..    Comparison: January 14, 2020time 1:36 PM.    Findings/  impression: Dobbhoff tube followed to the GE junction with the tip not being included. Stable positioning of endotracheal tube. Hyperinflation. Heart, lungs and mediastinum are unremarkable. Stable bony structures.        < from: CT Head No Cont (01.12.20 @ 15:44) >  EXAM:  CT BRAIN                          PROCEDURE DATE:  01/12/2020          INTERPRETATION:  Axial images were obtained from the skull base to the cranial vertex without intravenous contrast.  Soft tissue and bone algorithm images were evaluated.    Clinical information: Fixed upward gaze.    The ventricles, sulci and cisterns are normal in caliber for patient's age.  There is no evidence of intracranial hemorrhage, mass or acute infarction. Patchy areas of low attenuation in the cerebral white matter are nonspecific but most commonly reflect changes of chronic ischemic microangiopathy in a patient of this age. There is no lesion or fracture of the skull or skull base.    IMPRESSION:    No acute findings.              Vital Signs Last 24 Hrs  T(C): 37.1 (17 Jan 2020 05:00), Max: 37.9 (16 Jan 2020 19:07)  T(F): 98.8 (17 Jan 2020 05:00), Max: 100.3 (16 Jan 2020 19:07)  HR: 89 (17 Jan 2020 06:02) (60 - 120)  BP: 168/77 (17 Jan 2020 04:22) (143/72 - 236/144)  BP(mean): 107 (17 Jan 2020 04:22) (103 - 179)  RR: 22 (17 Jan 2020 06:02) (19 - 40)  SpO2: 95% (17 Jan 2020 06:02) (88% - 100%)    REVIEW OF SYSTEMS:    CONSTITUTIONAL: fatigue  EYES: No eye pain, visual disturbances, or discharge  ENMT:  No difficulty hearing, tinnitus, vertigo; No sinus or throat pain  NECK: No pain or stiffness  BREASTS: No pain, masses, or nipple discharge  RESPIRATORY: shortness of breath  CARDIOVASCULAR: No chest pain, palpitations, dizziness, or leg swelling  GASTROINTESTINAL: No abdominal or epigastric pain. No nausea, vomiting, or hematemesis; No diarrhea or constipation. No melena or hematochezia.  GENITOURINARY: No dysuria, frequency, hematuria, or incontinence  NEUROLOGICAL: No headaches, memory loss, loss of strength, numbness, or tremors  SKIN: No itching, burning, rashes, or lesions   LYMPH NODES: No enlarged glands  ENDOCRINE: No heat or cold intolerance; No hair loss  MUSCULOSKELETAL: No joint pain or swelling; No muscle, back, or extremity pain  PSYCHIATRIC: No depression, anxiety, mood swings, or difficulty sleeping  HEME/LYMPH: No easy bruising, or bleeding gums  ALLERGY AND IMMUNOLOGIC: No hives or eczema  VASCULAR: no swelling, erythema        Physical Exam: thin 73 yo gentleman lying in semi Randhawa's position, feeling better    Head: normocephalic, atraumatic    Eyes: PERRLA, EOMI, no nystagmus, sclera anicteric    ENT: nasal discharge, uvula midline, no oropharyngeal erythema/exudate    Neck: supple, negative JVD, negative carotid bruits, no thyromegaly    Chest: dec breath sounds    Cardiovascular: regular rate and rhythm, neg murmurs/rubs/gallops    Abdomen: soft, non distended, non tender, negative rebound/guarding, normal bowel sounds, neg hepatosplenomegaly    Extremities: WWP, neg cyanosis/clubbing/edema, negative calf tenderness to palpation, negative Damion's sign    :     Neurologic Exam:      Motor Exam:    Upper Extremities:     RIght:    no focal weakness    Left :     no focal weakness    Lower Extremities:                 Right:   no focal weakness                 Left:      no focal weakness               Sensory:    intact to LT/PP in all UE/LE dermatomes    DTR:            = biceps/     triceps/     brachioradialis                      = patella/   medial hamstring/ankle                      neg clonus                      neg Babinski                          Gait:  not tested        PM&R Impression:    1) deconditioned  2) no focal weakness        Recommendations:    1) Physical therapy focusing on therapeutic exercises, bed mobility/transfer out of bed evaluation, progressive ambulation with assistive devices prn.    2) Disposition Plan/Recs: pending functional progress

## 2020-01-17 NOTE — PROGRESS NOTE ADULT - SUBJECTIVE AND OBJECTIVE BOX
INTERVAL HISTORY:    75 yo M with no pertinent medical history (but without any physician visits for years), initially brought into [facility] ED with dyspnea with flu-like symptoms that had been worsening for 5 days. He was found to be in hypercapneic respiratory failure and was intubated in the ED after having persistent dyspnea and increasing work of breathing even after a short trial of BiPAP. He was admitted to the MICU where he was continued on brochodilators and systemic steroids. RVP was positive for RVP, which was attributed as the trigger for his respiratory distress. He was extubated on 1/14, and has since then been transferred to the step down unit.  He was seen at bedside     All other components of the 10 point review of systems is negative aside from those mentioned above.    PAST MEDICAL & SURGICAL HISTORY:  No pertinent past medical history  No significant past surgical history    Smoking history:  [ ] Current [X ] Former [ ] Never    VITAL SIGNS:  ICU Vital Signs Last 24 Hrs  T(C): 37.2 (17 Jan 2020 09:26), Max: 37.9 (16 Jan 2020 19:07)  T(F): 98.9 (17 Jan 2020 09:26), Max: 100.3 (16 Jan 2020 19:07)  HR: 96 (17 Jan 2020 10:10) (85 - 120)  BP: 197/89 (17 Jan 2020 10:10) (157/77 - 236/144)  BP(mean): 128 (17 Jan 2020 10:10) (107 - 179)  ABP: --  ABP(mean): --  RR: 25 (17 Jan 2020 10:10) (19 - 40)  SpO2: 97% (17 Jan 2020 10:10) (88% - 100%)        01-16 @ 07:01  -  01-17 @ 07:00  --------------------------------------------------------  IN: 7.5 mL / OUT: 0 mL / NET: 7.5 mL      CAPILLARY BLOOD GLUCOSE      POCT Blood Glucose.: 82 mg/dL (17 Jan 2020 05:44)      PHYSICAL EXAM:  Constitutional: resting comfortably in bed, NAD  HEENT: NC/AT; PERRL, anicteric sclera; no oropharyngeal erythema or exudates; MMM  Neck: supple, no JVD  Respiratory: CTA B/L, no W/R/R; respirations appear non-labored, speaking full sentences  Cardiovascular: +S1/S2, RRR  Gastrointestinal: abdomen soft, NT/ND; +BS x4  Extremities: WWP; no clubbing, cyanosis or edema  Vascular: 2+ radial, DP/PT and femoral pulses B/L      MEDICATIONS:  MEDICATIONS  (STANDING):  albuterol/ipratropium for Nebulization 3 milliLiter(s) Nebulizer every 4 hours  amLODIPine   Tablet 5 milliGRAM(s) Oral daily  buDESOnide    Inhalation Suspension 0.5 milliGRAM(s) Inhalation every 12 hours  dextrose 5%. 1000 milliLiter(s) (50 mL/Hr) IV Continuous <Continuous>  dextrose 50% Injectable 12.5 Gram(s) IV Push once  dextrose 50% Injectable 25 Gram(s) IV Push once  dextrose 50% Injectable 25 Gram(s) IV Push once  enoxaparin Injectable 30 milliGRAM(s) SubCutaneous every 24 hours  insulin lispro (HumaLOG) corrective regimen sliding scale   SubCutaneous every 6 hours  pantoprazole   Suspension 40 milliGRAM(s) Oral daily  petrolatum Ophthalmic Ointment 1 Application(s) Both EYES three times a day  predniSONE   Tablet 40 milliGRAM(s) Oral daily    MEDICATIONS  (PRN):  dextrose 40% Gel 15 Gram(s) Oral once PRN Blood Glucose LESS THAN 70 milliGRAM(s)/deciliter  glucagon  Injectable 1 milliGRAM(s) IntraMuscular once PRN Glucose LESS THAN 70 milligrams/deciliter  guaiFENesin   Syrup  (Sugar-Free) 100 milliGRAM(s) Oral every 6 hours PRN Cough  LORazepam   Injectable 0.5 milliGRAM(s) IV Push every 4 hours PRN Anxiety      ALLERGIES:  Allergies    No Known Allergies    Intolerances        LABS:                        11.4   11.94 )-----------( 313      ( 17 Jan 2020 08:04 )             37.2     01-17    144  |  98  |  17  ----------------------------<  83  3.5   |  31  |  0.78    Ca    9.0      17 Jan 2020 08:03  Mg     2.0     01-17    TPro  6.0  /  Alb  3.0<L>  /  TBili  0.4  /  DBili  x   /  AST  32  /  ALT  38  /  AlkPhos  46  01-16          RADIOLOGY & ADDITIONAL TESTS: INTERVAL HISTORY:    73 yo M with no pertinent medical history (but without any physician visits for years), initially brought into [facility] ED with dyspnea with flu-like symptoms that had been worsening for 5 days. He was found to be in hypercapneic respiratory failure and was intubated in the ED after having persistent dyspnea and increasing work of breathing even after a short trial of BiPAP. He was admitted to the MICU where he was continued on brochodilators and systemic steroids. RVP was positive for RVP, which was attributed as the trigger for his respiratory distress. He was extubated on 1/14, and has since then been transferred to the step down unit.  He was seen and examined at bedside. States that he feels better with his breathing, but still far from his normal state    All other components of the 10 point review of systems is negative aside from those mentioned above.    PAST MEDICAL & SURGICAL HISTORY:  No pertinent past medical history  No significant past surgical history    Smoking history:  [ ] Current [X ] Former [ ] Never    VITAL SIGNS:  ICU Vital Signs Last 24 Hrs  T(C): 37.2 (17 Jan 2020 09:26), Max: 37.9 (16 Jan 2020 19:07)  T(F): 98.9 (17 Jan 2020 09:26), Max: 100.3 (16 Jan 2020 19:07)  HR: 96 (17 Jan 2020 10:10) (85 - 120)  BP: 197/89 (17 Jan 2020 10:10) (157/77 - 236/144)  BP(mean): 128 (17 Jan 2020 10:10) (107 - 179)  ABP: --  ABP(mean): --  RR: 25 (17 Jan 2020 10:10) (19 - 40)  SpO2: 97% (17 Jan 2020 10:10) (88% - 100%)        01-16 @ 07:01  -  01-17 @ 07:00  --------------------------------------------------------  IN: 7.5 mL / OUT: 0 mL / NET: 7.5 mL      CAPILLARY BLOOD GLUCOSE      POCT Blood Glucose.: 82 mg/dL (17 Jan 2020 05:44)      PHYSICAL EXAM:  Constitutional: resting comfortably in bed, NAD  HEENT: NC/AT; PERRL, anicteric sclera; no oropharyngeal erythema or exudates; MMM  Neck: supple, no JVD  Respiratory: Expiratory wheezes still heard bilaterally, improved air movement; Respirations seem mildly labored but speaking full sentences on HFNC  Cardiovascular: +S1/S2, RRR  Gastrointestinal: abdomen soft, NT/ND; +BS x4  Extremities: WWP; no clubbing, cyanosis or edema  Vascular: 2+ radial, DP/PT and femoral pulses B/L      MEDICATIONS:  MEDICATIONS  (STANDING):  albuterol/ipratropium for Nebulization 3 milliLiter(s) Nebulizer every 4 hours  amLODIPine   Tablet 5 milliGRAM(s) Oral daily  buDESOnide    Inhalation Suspension 0.5 milliGRAM(s) Inhalation every 12 hours  dextrose 5%. 1000 milliLiter(s) (50 mL/Hr) IV Continuous <Continuous>  dextrose 50% Injectable 12.5 Gram(s) IV Push once  dextrose 50% Injectable 25 Gram(s) IV Push once  dextrose 50% Injectable 25 Gram(s) IV Push once  enoxaparin Injectable 30 milliGRAM(s) SubCutaneous every 24 hours  insulin lispro (HumaLOG) corrective regimen sliding scale   SubCutaneous every 6 hours  pantoprazole   Suspension 40 milliGRAM(s) Oral daily  petrolatum Ophthalmic Ointment 1 Application(s) Both EYES three times a day  predniSONE   Tablet 40 milliGRAM(s) Oral daily    MEDICATIONS  (PRN):  dextrose 40% Gel 15 Gram(s) Oral once PRN Blood Glucose LESS THAN 70 milliGRAM(s)/deciliter  glucagon  Injectable 1 milliGRAM(s) IntraMuscular once PRN Glucose LESS THAN 70 milligrams/deciliter  guaiFENesin   Syrup  (Sugar-Free) 100 milliGRAM(s) Oral every 6 hours PRN Cough  LORazepam   Injectable 0.5 milliGRAM(s) IV Push every 4 hours PRN Anxiety      ALLERGIES:  Allergies    No Known Allergies    Intolerances        LABS:                        11.4   11.94 )-----------( 313      ( 17 Jan 2020 08:04 )             37.2     01-17    144  |  98  |  17  ----------------------------<  83  3.5   |  31  |  0.78    Ca    9.0      17 Jan 2020 08:03  Mg     2.0     01-17    TPro  6.0  /  Alb  3.0<L>  /  TBili  0.4  /  DBili  x   /  AST  32  /  ALT  38  /  AlkPhos  46  01-16          RADIOLOGY & ADDITIONAL TESTS:   Reviewed

## 2020-01-18 DIAGNOSIS — R39.11 HESITANCY OF MICTURITION: ICD-10-CM

## 2020-01-18 DIAGNOSIS — I10 ESSENTIAL (PRIMARY) HYPERTENSION: ICD-10-CM

## 2020-01-18 LAB
ANION GAP SERPL CALC-SCNC: 15 MMOL/L — SIGNIFICANT CHANGE UP (ref 5–17)
BUN SERPL-MCNC: 13 MG/DL — SIGNIFICANT CHANGE UP (ref 7–23)
CALCIUM SERPL-MCNC: 9.2 MG/DL — SIGNIFICANT CHANGE UP (ref 8.4–10.5)
CHLORIDE SERPL-SCNC: 98 MMOL/L — SIGNIFICANT CHANGE UP (ref 96–108)
CO2 SERPL-SCNC: 28 MMOL/L — SIGNIFICANT CHANGE UP (ref 22–31)
CREAT SERPL-MCNC: 0.8 MG/DL — SIGNIFICANT CHANGE UP (ref 0.5–1.3)
GLUCOSE BLDC GLUCOMTR-MCNC: 109 MG/DL — HIGH (ref 70–99)
GLUCOSE BLDC GLUCOMTR-MCNC: 182 MG/DL — HIGH (ref 70–99)
GLUCOSE BLDC GLUCOMTR-MCNC: 190 MG/DL — HIGH (ref 70–99)
GLUCOSE BLDC GLUCOMTR-MCNC: 88 MG/DL — SIGNIFICANT CHANGE UP (ref 70–99)
GLUCOSE BLDC GLUCOMTR-MCNC: 88 MG/DL — SIGNIFICANT CHANGE UP (ref 70–99)
GLUCOSE SERPL-MCNC: 106 MG/DL — HIGH (ref 70–99)
HCT VFR BLD CALC: 41.3 % — SIGNIFICANT CHANGE UP (ref 39–50)
HGB BLD-MCNC: 12.7 G/DL — LOW (ref 13–17)
MAGNESIUM SERPL-MCNC: 1.8 MG/DL — SIGNIFICANT CHANGE UP (ref 1.6–2.6)
MCHC RBC-ENTMCNC: 28.8 PG — SIGNIFICANT CHANGE UP (ref 27–34)
MCHC RBC-ENTMCNC: 30.8 GM/DL — LOW (ref 32–36)
MCV RBC AUTO: 93.7 FL — SIGNIFICANT CHANGE UP (ref 80–100)
NRBC # BLD: 0 /100 WBCS — SIGNIFICANT CHANGE UP (ref 0–0)
PHOSPHATE SERPL-MCNC: 2.4 MG/DL — LOW (ref 2.5–4.5)
PLATELET # BLD AUTO: 339 K/UL — SIGNIFICANT CHANGE UP (ref 150–400)
POTASSIUM SERPL-MCNC: 3.6 MMOL/L — SIGNIFICANT CHANGE UP (ref 3.5–5.3)
POTASSIUM SERPL-SCNC: 3.6 MMOL/L — SIGNIFICANT CHANGE UP (ref 3.5–5.3)
RBC # BLD: 4.41 M/UL — SIGNIFICANT CHANGE UP (ref 4.2–5.8)
RBC # FLD: 13.5 % — SIGNIFICANT CHANGE UP (ref 10.3–14.5)
SODIUM SERPL-SCNC: 141 MMOL/L — SIGNIFICANT CHANGE UP (ref 135–145)
WBC # BLD: 10.36 K/UL — SIGNIFICANT CHANGE UP (ref 3.8–10.5)
WBC # FLD AUTO: 10.36 K/UL — SIGNIFICANT CHANGE UP (ref 3.8–10.5)

## 2020-01-18 PROCEDURE — 99233 SBSQ HOSP IP/OBS HIGH 50: CPT | Mod: GC

## 2020-01-18 RX ORDER — LISINOPRIL 2.5 MG/1
5 TABLET ORAL ONCE
Refills: 0 | Status: COMPLETED | OUTPATIENT
Start: 2020-01-18 | End: 2020-01-18

## 2020-01-18 RX ORDER — LISINOPRIL 2.5 MG/1
2.5 TABLET ORAL ONCE
Refills: 0 | Status: COMPLETED | OUTPATIENT
Start: 2020-01-18 | End: 2020-01-18

## 2020-01-18 RX ORDER — TAMSULOSIN HYDROCHLORIDE 0.4 MG/1
0.4 CAPSULE ORAL AT BEDTIME
Refills: 0 | Status: DISCONTINUED | OUTPATIENT
Start: 2020-01-18 | End: 2020-01-21

## 2020-01-18 RX ORDER — LISINOPRIL 2.5 MG/1
5 TABLET ORAL EVERY 24 HOURS
Refills: 0 | Status: DISCONTINUED | OUTPATIENT
Start: 2020-01-18 | End: 2020-01-21

## 2020-01-18 RX ORDER — POTASSIUM CHLORIDE 20 MEQ
20 PACKET (EA) ORAL ONCE
Refills: 0 | Status: COMPLETED | OUTPATIENT
Start: 2020-01-18 | End: 2020-01-18

## 2020-01-18 RX ORDER — LISINOPRIL 2.5 MG/1
2.5 TABLET ORAL DAILY
Refills: 0 | Status: DISCONTINUED | OUTPATIENT
Start: 2020-01-18 | End: 2020-01-18

## 2020-01-18 RX ADMIN — ENOXAPARIN SODIUM 30 MILLIGRAM(S): 100 INJECTION SUBCUTANEOUS at 18:08

## 2020-01-18 RX ADMIN — Medication 3 MILLILITER(S): at 03:08

## 2020-01-18 RX ADMIN — Medication 1 APPLICATION(S): at 05:44

## 2020-01-18 RX ADMIN — PANTOPRAZOLE SODIUM 40 MILLIGRAM(S): 20 TABLET, DELAYED RELEASE ORAL at 05:44

## 2020-01-18 RX ADMIN — LISINOPRIL 5 MILLIGRAM(S): 2.5 TABLET ORAL at 21:46

## 2020-01-18 RX ADMIN — Medication 0.5 MILLIGRAM(S): at 06:46

## 2020-01-18 RX ADMIN — Medication 30 MILLIGRAM(S): at 05:44

## 2020-01-18 RX ADMIN — Medication 3 MILLILITER(S): at 21:46

## 2020-01-18 RX ADMIN — Medication 3 MILLILITER(S): at 05:44

## 2020-01-18 RX ADMIN — Medication 20 MILLIEQUIVALENT(S): at 10:39

## 2020-01-18 RX ADMIN — Medication 3 MILLILITER(S): at 14:00

## 2020-01-18 RX ADMIN — LISINOPRIL 5 MILLIGRAM(S): 2.5 TABLET ORAL at 10:39

## 2020-01-18 RX ADMIN — Medication 3 MILLILITER(S): at 10:39

## 2020-01-18 RX ADMIN — Medication 2: at 19:17

## 2020-01-18 RX ADMIN — Medication 0.5 MILLIGRAM(S): at 18:07

## 2020-01-18 RX ADMIN — AMLODIPINE BESYLATE 10 MILLIGRAM(S): 2.5 TABLET ORAL at 05:44

## 2020-01-18 RX ADMIN — TAMSULOSIN HYDROCHLORIDE 0.4 MILLIGRAM(S): 0.4 CAPSULE ORAL at 21:46

## 2020-01-18 RX ADMIN — Medication 1 APPLICATION(S): at 13:26

## 2020-01-18 RX ADMIN — Medication 3 MILLILITER(S): at 18:08

## 2020-01-18 RX ADMIN — Medication 2: at 11:34

## 2020-01-18 RX ADMIN — LISINOPRIL 2.5 MILLIGRAM(S): 2.5 TABLET ORAL at 00:05

## 2020-01-18 NOTE — PROGRESS NOTE ADULT - ASSESSMENT
75 yo M, no pertinent PMHx, no allergies, no home medications BIBEMS for shortness of beath, found to be in hypercapnic respiratory failure 2/2 RSV in context of possible COPD and chronic smoking history.    Of note, patient has not been to a PMD since he was ~16yo.     #htn  #bph 75 yo M, no pertinent PMHx, no allergies, no home medications BIBEMS for shortness of beath, found to be in hypercapnic respiratory failure 2/2 RSV in context of possible COPD and chronic smoking history.    Of note, patient has not been to a PMD since he was ~16yo.     #htn  #bph  tamsulosin 0.4 mg at bedtime    r/o dm  hba1c 75 yo M, no pertinent PMHx, no allergies, no home medications BIBEMS for shortness of beath, found to be in hypercapnic respiratory failure 2/2 RSV in context of possible COPD and chronic smoking history.

## 2020-01-18 NOTE — PROGRESS NOTE ADULT - SUBJECTIVE AND OBJECTIVE BOX
HOSPITAL COURSE:  75 yo M, no pertinent PMHx, no allergies, no home medications BIBEMS c/o SOB, wheezing, difficulty breathing in the setting of flu-like symptoms, cough and body aches x 5 days. Patient found to have hypercapnic respiratory failure 2/2 RSV in context of possible hx of COPD and chronic tobacco smoking history. Has recent marijuana vaping history. The patient was intubated, and treated with azithromycin for antiinflammatory effects, solumedrol BID, and duonebs. Patient failed multiple SBT but was eventually extubated on 1/15. Patient was initially tachypneic and using significant accessory muscles while on BIPAP, but was slowly weaned to high flow nasal cannula, now on 4LNC. Patient received multiple doses of ativan PRN to help with anxiety regarding breathing, which helped patient with his respiratory status. Patient passed trial of void and was stepped down to 7lachman. On 7lachman patient initially needed BiPAP overnight. Started IS and encouraged ambulation, continued tapering steroids. Patient was     SUBJECTIVE / INTERVAL HPI: Patient seen and examined at bedside.     VITAL SIGNS:  Vital Signs Last 24 Hrs  T(C): 36.6 (18 Jan 2020 09:46), Max: 37.3 (18 Jan 2020 01:39)  T(F): 97.9 (18 Jan 2020 09:46), Max: 99.2 (18 Jan 2020 01:39)  HR: 98 (18 Jan 2020 12:15) (88 - 98)  BP: 159/87 (18 Jan 2020 12:15) (159/87 - 196/95)  BP(mean): 113 (18 Jan 2020 12:15) (113 - 136)  RR: 20 (18 Jan 2020 12:15) (16 - 20)  SpO2: 96% (18 Jan 2020 12:15) (95% - 97%)    REVIEW OF SYSTEMS:    CONSTITUTIONAL: No weakness, fevers or chills.   EYES/ENT: No visual changes;  No vertigo or throat pain   NECK: No pain or stiffness  RESPIRATORY: No cough, wheezing, hemoptysis; No shortness of breath  CARDIOVASCULAR: No chest pain or palpitations  GASTROINTESTINAL: No abdominal or epigastric pain. No nausea, vomiting, or hematemesis; No diarrhea or constipation. No melena or hematochezia.  GENITOURINARY: No dysuria, frequency or hematuria  NEUROLOGICAL: No numbness or weakness  SKIN: No itching, burning, rashes, or lesions   All other review of systems is negative unless indicated above.    PHYSICAL EXAM:  General: WDWN  HEENT: NC/AT; PERRL, clear conjunctiva  Neck: supple, no JVD  Cardiovascular: +S1/S2; RRR, no M/R/G  Respiratory: CTA b/l; no W/R/R  Gastrointestinal: soft, NT/ND; +BSx4  Extremities: WWP; 2+ peripheral pulses; no edema   Neurological: AAOx3; no focal deficits    MEDICATIONS:  MEDICATIONS  (STANDING):  albuterol/ipratropium for Nebulization 3 milliLiter(s) Nebulizer every 4 hours  amLODIPine   Tablet 10 milliGRAM(s) Oral daily  buDESOnide    Inhalation Suspension 0.5 milliGRAM(s) Inhalation every 12 hours  dextrose 5%. 1000 milliLiter(s) (50 mL/Hr) IV Continuous <Continuous>  dextrose 50% Injectable 12.5 Gram(s) IV Push once  dextrose 50% Injectable 25 Gram(s) IV Push once  dextrose 50% Injectable 25 Gram(s) IV Push once  enoxaparin Injectable 30 milliGRAM(s) SubCutaneous every 24 hours  insulin lispro (HumaLOG) corrective regimen sliding scale   SubCutaneous every 6 hours  pantoprazole    Tablet 40 milliGRAM(s) Oral before breakfast  petrolatum Ophthalmic Ointment 1 Application(s) Both EYES three times a day    MEDICATIONS  (PRN):  dextrose 40% Gel 15 Gram(s) Oral once PRN Blood Glucose LESS THAN 70 milliGRAM(s)/deciliter  glucagon  Injectable 1 milliGRAM(s) IntraMuscular once PRN Glucose LESS THAN 70 milligrams/deciliter  guaiFENesin   Syrup  (Sugar-Free) 100 milliGRAM(s) Oral every 6 hours PRN Cough  LORazepam   Injectable 0.5 milliGRAM(s) IV Push every 4 hours PRN Anxiety      ALLERGIES:  Allergies    No Known Allergies    Intolerances        LABS:                        12.7   10.36 )-----------( 339      ( 18 Jan 2020 07:21 )             41.3     01-18    141  |  98  |  13  ----------------------------<  106<H>  3.6   |  28  |  0.80    Ca    9.2      18 Jan 2020 07:21  Phos  2.4     01-18  Mg     1.8     01-18          CAPILLARY BLOOD GLUCOSE      POCT Blood Glucose.: 182 mg/dL (18 Jan 2020 11:13)      RADIOLOGY & ADDITIONAL TESTS: Reviewed. HOSPITAL COURSE:  75 yo M, no pertinent PMHx, no allergies, no home medications BIBEMS c/o SOB, wheezing, difficulty breathing in the setting of flu-like symptoms, cough and body aches x 5 days. Patient found to have hypercapnic respiratory failure 2/2 RSV in context of possible hx of COPD and chronic tobacco smoking history. Has recent marijuana vaping history. The patient was intubated, and treated with azithromycin for antiinflammatory effects, solumedrol BID, and duonebs. Patient failed multiple SBT but was eventually extubated on 1/15. Patient was initially tachypneic and using significant accessory muscles while on BIPAP, but was slowly weaned to high flow nasal cannula, now on 4LNC. Patient received multiple doses of ativan PRN to help with anxiety regarding breathing, which helped patient with his respiratory status. Patient passed trial of void and was stepped down to 7lachman. On 7lachman patient initially needed BiPAP overnight. Started IS and encouraged ambulation, continued tapering steroids. Patient now comfortable on NC, and medically optimized for transfer to the regional medical floor for further management of his respiratory failure abd blood pressure.     SUBJECTIVE / INTERVAL HPI: Patient seen and examined at bedside.     VITAL SIGNS:  Vital Signs Last 24 Hrs  T(C): 36.6 (18 Jan 2020 09:46), Max: 37.3 (18 Jan 2020 01:39)  T(F): 97.9 (18 Jan 2020 09:46), Max: 99.2 (18 Jan 2020 01:39)  HR: 98 (18 Jan 2020 12:15) (88 - 98)  BP: 159/87 (18 Jan 2020 12:15) (159/87 - 196/95)  BP(mean): 113 (18 Jan 2020 12:15) (113 - 136)  RR: 20 (18 Jan 2020 12:15) (16 - 20)  SpO2: 96% (18 Jan 2020 12:15) (95% - 97%)    REVIEW OF SYSTEMS:    CONSTITUTIONAL: No weakness, fevers or chills.   EYES/ENT: No visual changes;  No vertigo or throat pain   NECK: No pain or stiffness  RESPIRATORY: No cough, wheezing, hemoptysis; No shortness of breath  CARDIOVASCULAR: No chest pain or palpitations  GASTROINTESTINAL: No abdominal or epigastric pain. No nausea, vomiting, or hematemesis; No diarrhea or constipation. No melena or hematochezia.  GENITOURINARY: No dysuria, frequency or hematuria  NEUROLOGICAL: No numbness or weakness  SKIN: No itching, burning, rashes, or lesions   All other review of systems is negative unless indicated above.    PHYSICAL EXAM:  General: WDWN, on HFNC now weaned to 4LNC  HEENT: NC/AT; PERRL, clear conjunctiva  Neck: supple, no JVD  Cardiovascular: +S1/S2; RRR, no M/R/G  Respiratory: Decreased air entry in all lung fields but improved from yesterday, no wheezing or crackles  Gastrointestinal: soft, NT/ND; +BSx4  Extremities: WWP; 2+ peripheral pulses; no edema   Neurological: AAOx3; no focal deficits    MEDICATIONS:  MEDICATIONS  (STANDING):  albuterol/ipratropium for Nebulization 3 milliLiter(s) Nebulizer every 4 hours  amLODIPine   Tablet 10 milliGRAM(s) Oral daily  buDESOnide    Inhalation Suspension 0.5 milliGRAM(s) Inhalation every 12 hours  dextrose 5%. 1000 milliLiter(s) (50 mL/Hr) IV Continuous <Continuous>  dextrose 50% Injectable 12.5 Gram(s) IV Push once  dextrose 50% Injectable 25 Gram(s) IV Push once  dextrose 50% Injectable 25 Gram(s) IV Push once  enoxaparin Injectable 30 milliGRAM(s) SubCutaneous every 24 hours  insulin lispro (HumaLOG) corrective regimen sliding scale   SubCutaneous every 6 hours  pantoprazole    Tablet 40 milliGRAM(s) Oral before breakfast  petrolatum Ophthalmic Ointment 1 Application(s) Both EYES three times a day    MEDICATIONS  (PRN):  dextrose 40% Gel 15 Gram(s) Oral once PRN Blood Glucose LESS THAN 70 milliGRAM(s)/deciliter  glucagon  Injectable 1 milliGRAM(s) IntraMuscular once PRN Glucose LESS THAN 70 milligrams/deciliter  guaiFENesin   Syrup  (Sugar-Free) 100 milliGRAM(s) Oral every 6 hours PRN Cough  LORazepam   Injectable 0.5 milliGRAM(s) IV Push every 4 hours PRN Anxiety      ALLERGIES:  Allergies    No Known Allergies    Intolerances        LABS:                        12.7   10.36 )-----------( 339      ( 18 Jan 2020 07:21 )             41.3     01-18    141  |  98  |  13  ----------------------------<  106<H>  3.6   |  28  |  0.80    Ca    9.2      18 Jan 2020 07:21  Phos  2.4     01-18  Mg     1.8     01-18          CAPILLARY BLOOD GLUCOSE  POCT Blood Glucose.: 182 mg/dL (18 Jan 2020 11:13)        RADIOLOGY & ADDITIONAL TESTS: Reviewed.

## 2020-01-18 NOTE — PROGRESS NOTE ADULT - PROBLEM SELECTOR PLAN 4
Hgb 15 on admission now 10.4, normocytic. No active sign of bleed on exam. Pt denying lightheadedness, fatigue, palpitations, SOB. Likely 2/2 poor nutritional intake in setting of hospitalization for severe acute illness.  -Hb steadily rising, today 12.7  - Monitor daily CBC No known hx of HTN (has not been to doctor since 18yo), No known hx of HTN (has not been to doctor since 18yo), been persistently hypertensive SBP~160s. Patient asx, denying lightheadedness, dizziness, changes in vision. On amlodipine 10mg, starting lisinopril 10mg tomorrow (1/18). Will continue to monitor blood pressure.  -Pt will need outpatient management of BP

## 2020-01-18 NOTE — PHYSICAL THERAPY INITIAL EVALUATION ADULT - PERTINENT HX OF CURRENT PROBLEM, REHAB EVAL
75 yo M, no pertinent PMHx, no allergies, no home medications BIBEMS c/o SOB, wheezing, difficulty breathing in the setting of flu-like symptoms x 5 days. Pt c/o cough, body aches. Per wife pt is not on anti-coagulants, beta-blockers, anti-hypertensive drugs. Pt smokes marijuana regularly. Pt states he has not seen a PMD in years. Pt's wife admits that he used a vaporizer for marijuana once recently, but has been using a vicks vaporizer since onset of symptoms.

## 2020-01-18 NOTE — PROGRESS NOTE ADULT - SUBJECTIVE AND OBJECTIVE BOX
*** INCOMPLETE ***    INTERVAL EVENTS:     OVERNIGHT EVENTS: As per overnight staff, there were no acute events overnight    SUBJECTIVE HPI: Patient seen and examined at bedside. Patient resting comfortably, no complaints at this time. Patient denies: fever, chills, weakness, dizziness, headaches, changes in vision, chest pain, palpitations, shortness of breath, cough, N/V, diarrhea or constipation, dysuria, LE edema. ROS otherwise negative.      VITAL SIGNS:  Vital Signs Last 24 Hrs  T(C): 36.5 (18 Jan 2020 16:30), Max: 37.3 (18 Jan 2020 01:39)  T(F): 97.7 (18 Jan 2020 16:30), Max: 99.2 (18 Jan 2020 01:39)  HR: 89 (18 Jan 2020 16:30) (88 - 98)  BP: 160/92 (18 Jan 2020 16:30) (159/87 - 196/95)  BP(mean): 113 (18 Jan 2020 12:15) (113 - 136)  RR: 18 (18 Jan 2020 16:30) (16 - 21)  SpO2: 97% (18 Jan 2020 16:30) (95% - 97%)      01-17-20 @ 07:01  -  01-18-20 @ 07:00  --------------------------------------------------------  IN: 850 mL / OUT: 1100 mL / NET: -250 mL        PHYSICAL EXAM:  General: WDWN, comfortable in bed, NAD  Neurological: AAOx3, no focal deficits  HEENT: NC/AT; EOMI, PERRL, anicteric sclera, no discharge or exudate from eyes or nose; MMM  Neck: supple, no cervical or post-auricular lymphadenopathy  Cardiovascular: +S1/S2, no murmurs/rubs/gallops, RRR  Respiratory: CTA B/L, no diminished breath sounds, no wheezes/rales/rhonchi, no increased work of breathing or accessory muscle use  Gastrointestinal: soft, NT/ND; active BSx4 quadrants  Genitourinary: no suprapubic tenderness, no CVA tenderness  Extremities: WWP; no edema, clubbing or cyanosis  Vascular: 2+ radial, DP/PT pulses B/L  Skin: no rashes  Lines/Drains:       MEDICATIONS:  MEDICATIONS  (STANDING):  albuterol/ipratropium for Nebulization 3 milliLiter(s) Nebulizer every 4 hours  amLODIPine   Tablet 10 milliGRAM(s) Oral daily  buDESOnide    Inhalation Suspension 0.5 milliGRAM(s) Inhalation every 12 hours  dextrose 5%. 1000 milliLiter(s) (50 mL/Hr) IV Continuous <Continuous>  dextrose 50% Injectable 12.5 Gram(s) IV Push once  dextrose 50% Injectable 25 Gram(s) IV Push once  dextrose 50% Injectable 25 Gram(s) IV Push once  enoxaparin Injectable 30 milliGRAM(s) SubCutaneous every 24 hours  insulin lispro (HumaLOG) corrective regimen sliding scale   SubCutaneous every 6 hours  pantoprazole    Tablet 40 milliGRAM(s) Oral before breakfast  petrolatum Ophthalmic Ointment 1 Application(s) Both EYES three times a day    MEDICATIONS  (PRN):  dextrose 40% Gel 15 Gram(s) Oral once PRN Blood Glucose LESS THAN 70 milliGRAM(s)/deciliter  glucagon  Injectable 1 milliGRAM(s) IntraMuscular once PRN Glucose LESS THAN 70 milligrams/deciliter  guaiFENesin   Syrup  (Sugar-Free) 100 milliGRAM(s) Oral every 6 hours PRN Cough  LORazepam   Injectable 0.5 milliGRAM(s) IV Push every 4 hours PRN Anxiety      ALLERGIES/INTOLERANCES:  Allergies    No Known Allergies    Intolerances        LABS:                        12.7   10.36 )-----------( 339      ( 18 Jan 2020 07:21 )             41.3     01-18    141  |  98  |  13  ----------------------------<  106<H>  3.6   |  28  |  0.80    Ca    9.2      18 Jan 2020 07:21  Phos  2.4     01-18  Mg     1.8     01-18        CAPILLARY BLOOD GLUCOSE      POCT Blood Glucose.: 182 mg/dL (18 Jan 2020 11:13)                    Microbiology:      RADIOLOGY, EKG AND ADDITIONAL TESTS: Reviewed. *** TRANSFER TO [facility] (FROM [facility])***    HOSPITAL COURSE: 75 yo M, no pertinent PMHx, no allergies, no home medications BIBEMS c/o SOB, wheezing, difficulty breathing in the setting of flu-like symptoms, cough and body aches x 5 days. Patient found to have hypercapnic respiratory failure 2/2 RSV in context of possible hx of COPD and chronic tobacco smoking history. Has recent marijuana vaping history. The patient was intubated, and treated with azithromycin for antiinflammatory effects, solumedrol BID, and duonebs. Patient failed multiple SBT but was eventually extubated on 1/15. Patient was initially tachypneic and using significant accessory muscles while on BIPAP, but was slowly weaned to high flow nasal cannula, now on 4LNC. Patient received multiple doses of ativan PRN to help with anxiety regarding breathing, which helped patient with his respiratory status. Patient passed trial of [facility] and [facility] On [facility] patient initially needed BiPAP overnight. Started IS and encouraged ambulation, continued tapering steroids. Patient now comfortable on NC, and medically optimized for transfer to the regional medical floor for further management of his respiratory failure and blood pressure.     SUBJECTIVE HPI: Patient seen and examined at bedside. Patient endorsing feeling much better today, breathing more comfortably. He denies headaches, nausea or vomiting, shortness of breath, chest pain, abd pain, dysuria. Endorses having loose stools that started after initiation of his diet, however it has improved.      VITAL SIGNS:  Vital Signs Last 24 Hrs  T(C): 36.5 (18 Jan 2020 16:30), Max: 37.3 (18 Jan 2020 01:39)  T(F): 97.7 (18 Jan 2020 16:30), Max: 99.2 (18 Jan 2020 01:39)  HR: 89 (18 Jan 2020 16:30) (88 - 98)  BP: 160/92 (18 Jan 2020 16:30) (159/87 - 196/95)  BP(mean): 113 (18 Jan 2020 12:15) (113 - 136)  RR: 18 (18 Jan 2020 16:30) (16 - 21)  SpO2: 97% (18 Jan 2020 16:30) (95% - 97%)      01-17-20 @ 07:01  -  01-18-20 @ 07:00  --------------------------------------------------------  IN: 850 mL / OUT: 1100 mL / NET: -250 mL      PHYSICAL EXAM:  General: thin, NAD  Neurological: AAOx3, no focal deficits  HEENT: clear conjunctiva, no nasopharyngeal exudates or erythema; MMM  Neck: supple, small mobile nontender R submandibular lymph node  Cardiovascular: +S1/S2, no murmurs/rubs/gallops, RRR  Respiratory: on nasal cannula, speaking in full sentences, CTA B/L, no wheezes/rales/rhonchi, no diminished breath sounds,  no increased work of breathing or accessory muscle use  Gastrointestinal: soft, NT/ND; active BSx4 quadrants  Genitourinary: no suprapubic tenderness  Extremities: muscle wasting/thin extremities, WWP; no edema, clubbing or cyanosis  Vascular: 2+ radial, DP/PT pulses B/L  Skin: no rashes      MEDICATIONS:  MEDICATIONS  (STANDING):  albuterol/ipratropium for Nebulization 3 milliLiter(s) Nebulizer every 4 hours  amLODIPine   Tablet 10 milliGRAM(s) Oral daily  buDESOnide    Inhalation Suspension 0.5 milliGRAM(s) Inhalation every 12 hours  dextrose 5%. 1000 milliLiter(s) (50 mL/Hr) IV Continuous <Continuous>  dextrose 50% Injectable 12.5 Gram(s) IV Push once  dextrose 50% Injectable 25 Gram(s) IV Push once  dextrose 50% Injectable 25 Gram(s) IV Push once  enoxaparin Injectable 30 milliGRAM(s) SubCutaneous every 24 hours  insulin lispro (HumaLOG) corrective regimen sliding scale   SubCutaneous every 6 hours  pantoprazole    Tablet 40 milliGRAM(s) Oral before breakfast  petrolatum Ophthalmic Ointment 1 Application(s) Both EYES three times a day    MEDICATIONS  (PRN):  dextrose 40% Gel 15 Gram(s) Oral once PRN Blood Glucose LESS THAN 70 milliGRAM(s)/deciliter  glucagon  Injectable 1 milliGRAM(s) IntraMuscular once PRN Glucose LESS THAN 70 milligrams/deciliter  guaiFENesin   Syrup  (Sugar-Free) 100 milliGRAM(s) Oral every 6 hours PRN Cough  LORazepam   Injectable 0.5 milliGRAM(s) IV Push every 4 hours PRN Anxiety      ALLERGIES/INTOLERANCES:  Allergies: No Known Allergies      LABS:                        12.7   10.36 )-----------( 339      ( 18 Jan 2020 07:21 )             41.3     01-18    141  |  98  |  13  ----------------------------<  106<H>  3.6   |  28  |  0.80    Ca    9.2      18 Jan 2020 07:21  Phos  2.4     01-18  Mg     1.8     01-18      CAPILLARY BLOOD GLUCOSE  POCT Blood Glucose.: 182 mg/dL (18 Jan 2020 11:13)      RADIOLOGY, EKG AND ADDITIONAL TESTS: Reviewed.

## 2020-01-18 NOTE — PROGRESS NOTE ADULT - PROBLEM SELECTOR PLAN 6
1) PCP Contacted on Admission: (Y/N) --> Name & Phone #: pt without PCP, will coordinate care on discharge. Pt requiring sliding scale insulin for hyperglycemia.  -F/u HbA1c to assess for DM

## 2020-01-18 NOTE — PHYSICAL THERAPY INITIAL EVALUATION ADULT - LEVEL OF INDEPENDENCE: SIT/SUPINE, REHAB EVAL
not observed, patient left sitting in bedside chair with all lines intact, +call bell, left as received, instructed to press call bell and await assistance should patient desire to get up or need anything, in no apparent distress.

## 2020-01-18 NOTE — PROGRESS NOTE ADULT - PROBLEM SELECTOR PLAN 5
F: none  E: replete prn  N: Regular mechanical soft thin liquid diet with Ensure Enlive BID    DVT ppx: lovenox 30mg daily  GI ppx: pantoprazole 40mg daily while on prednisone  Code: FULL  Dispo: [facility] Hgb 15 on admission now 10.4, normocytic. No active sign of bleed on exam. Pt denying lightheadedness, fatigue, palpitations, SOB. Likely 2/2 poor nutritional intake in setting of hospitalization for severe acute illness.  -Hb steadily rising, today 12.7  - Monitor daily CBC

## 2020-01-18 NOTE — PHYSICAL THERAPY INITIAL EVALUATION ADULT - PATIENT/FAMILY/SIGNIFICANT OTHER GOALS STATEMENT, PT EVAL
Patient is willing and motivated to participate in physical therapy and would like to get up and get stronger

## 2020-01-18 NOTE — PHYSICAL THERAPY INITIAL EVALUATION ADULT - IMPAIRMENTS FOUND, PT EVAL
aerobic capacity/endurance/gait, locomotion, and balance/gross motor/muscle strength/ventilation and respiration/gas exchange

## 2020-01-18 NOTE — PHYSICAL THERAPY INITIAL EVALUATION ADULT - GENERAL OBSERVATIONS, REHAB EVAL
Received semi-Rnadhawa's position in bed with +HFNC 30% FiO2, 30LPM, +hep lock, +tele, +pulse ox, in no apparent distress. Patient appears to be resting comfortably in bed

## 2020-01-18 NOTE — PHYSICAL THERAPY INITIAL EVALUATION ADULT - ADDITIONAL COMMENTS
Patient lives with his wife in an elevator apartment with no steps to enter. Prior to admission, patient was independent for all functional mobility and ADLs without assistive device. Patient denies history of falls and denies home health assistance.

## 2020-01-18 NOTE — PHYSICAL THERAPY INITIAL EVALUATION ADULT - CRITERIA FOR SKILLED THERAPEUTIC INTERVENTIONS
functional limitations in following categories/therapy frequency/impairments found/risk reduction/prevention/rehab potential/anticipated discharge recommendation

## 2020-01-18 NOTE — PROGRESS NOTE ADULT - ASSESSMENT
75 yo M, no pertinent PMHx, no allergies, no home medications BIBEMS for shortness of beath, found to be in hypercapnic respiratory failure 2/2 RSV in context of possible COPD and chronic smoking history.

## 2020-01-19 LAB
ANION GAP SERPL CALC-SCNC: 16 MMOL/L — SIGNIFICANT CHANGE UP (ref 5–17)
BUN SERPL-MCNC: 15 MG/DL — SIGNIFICANT CHANGE UP (ref 7–23)
CALCIUM SERPL-MCNC: 9.2 MG/DL — SIGNIFICANT CHANGE UP (ref 8.4–10.5)
CHLORIDE SERPL-SCNC: 95 MMOL/L — LOW (ref 96–108)
CO2 SERPL-SCNC: 27 MMOL/L — SIGNIFICANT CHANGE UP (ref 22–31)
CREAT SERPL-MCNC: 0.78 MG/DL — SIGNIFICANT CHANGE UP (ref 0.5–1.3)
FERRITIN SERPL-MCNC: 379 NG/ML — SIGNIFICANT CHANGE UP (ref 30–400)
GLUCOSE BLDC GLUCOMTR-MCNC: 104 MG/DL — HIGH (ref 70–99)
GLUCOSE BLDC GLUCOMTR-MCNC: 113 MG/DL — HIGH (ref 70–99)
GLUCOSE BLDC GLUCOMTR-MCNC: 135 MG/DL — HIGH (ref 70–99)
GLUCOSE BLDC GLUCOMTR-MCNC: 72 MG/DL — SIGNIFICANT CHANGE UP (ref 70–99)
GLUCOSE BLDC GLUCOMTR-MCNC: 96 MG/DL — SIGNIFICANT CHANGE UP (ref 70–99)
GLUCOSE BLDC GLUCOMTR-MCNC: 97 MG/DL — SIGNIFICANT CHANGE UP (ref 70–99)
GLUCOSE BLDC GLUCOMTR-MCNC: 97 MG/DL — SIGNIFICANT CHANGE UP (ref 70–99)
GLUCOSE SERPL-MCNC: 111 MG/DL — HIGH (ref 70–99)
HBA1C BLD-MCNC: 6.1 % — HIGH (ref 4–5.6)
HCT VFR BLD CALC: 40.4 % — SIGNIFICANT CHANGE UP (ref 39–50)
HGB BLD-MCNC: 12.9 G/DL — LOW (ref 13–17)
IRON SATN MFR SERPL: 51 % — SIGNIFICANT CHANGE UP (ref 16–55)
IRON SATN MFR SERPL: 94 UG/DL — SIGNIFICANT CHANGE UP (ref 45–165)
MAGNESIUM SERPL-MCNC: 1.8 MG/DL — SIGNIFICANT CHANGE UP (ref 1.6–2.6)
MCHC RBC-ENTMCNC: 29.3 PG — SIGNIFICANT CHANGE UP (ref 27–34)
MCHC RBC-ENTMCNC: 31.9 GM/DL — LOW (ref 32–36)
MCV RBC AUTO: 91.8 FL — SIGNIFICANT CHANGE UP (ref 80–100)
NRBC # BLD: 0 /100 WBCS — SIGNIFICANT CHANGE UP (ref 0–0)
PHOSPHATE SERPL-MCNC: 2.6 MG/DL — SIGNIFICANT CHANGE UP (ref 2.5–4.5)
PLATELET # BLD AUTO: 346 K/UL — SIGNIFICANT CHANGE UP (ref 150–400)
POTASSIUM SERPL-MCNC: 3.4 MMOL/L — LOW (ref 3.5–5.3)
POTASSIUM SERPL-SCNC: 3.4 MMOL/L — LOW (ref 3.5–5.3)
RBC # BLD: 4.4 M/UL — SIGNIFICANT CHANGE UP (ref 4.2–5.8)
RBC # FLD: 13.2 % — SIGNIFICANT CHANGE UP (ref 10.3–14.5)
SODIUM SERPL-SCNC: 138 MMOL/L — SIGNIFICANT CHANGE UP (ref 135–145)
TIBC SERPL-MCNC: 185 UG/DL — LOW (ref 220–430)
TRANSFERRIN SERPL-MCNC: 168 MG/DL — LOW (ref 200–360)
UIBC SERPL-MCNC: 91 UG/DL — LOW (ref 110–370)
WBC # BLD: 10.06 K/UL — SIGNIFICANT CHANGE UP (ref 3.8–10.5)
WBC # FLD AUTO: 10.06 K/UL — SIGNIFICANT CHANGE UP (ref 3.8–10.5)

## 2020-01-19 PROCEDURE — 99233 SBSQ HOSP IP/OBS HIGH 50: CPT | Mod: GC

## 2020-01-19 RX ORDER — MAGNESIUM SULFATE 500 MG/ML
1 VIAL (ML) INJECTION ONCE
Refills: 0 | Status: COMPLETED | OUTPATIENT
Start: 2020-01-19 | End: 2020-01-19

## 2020-01-19 RX ORDER — POTASSIUM CHLORIDE 20 MEQ
40 PACKET (EA) ORAL
Refills: 0 | Status: COMPLETED | OUTPATIENT
Start: 2020-01-19 | End: 2020-01-19

## 2020-01-19 RX ADMIN — Medication 3 MILLILITER(S): at 17:37

## 2020-01-19 RX ADMIN — ENOXAPARIN SODIUM 30 MILLIGRAM(S): 100 INJECTION SUBCUTANEOUS at 17:37

## 2020-01-19 RX ADMIN — Medication 40 MILLIEQUIVALENT(S): at 07:50

## 2020-01-19 RX ADMIN — PANTOPRAZOLE SODIUM 40 MILLIGRAM(S): 20 TABLET, DELAYED RELEASE ORAL at 06:48

## 2020-01-19 RX ADMIN — Medication 0.5 MILLIGRAM(S): at 17:37

## 2020-01-19 RX ADMIN — Medication 20 MILLIGRAM(S): at 06:48

## 2020-01-19 RX ADMIN — TAMSULOSIN HYDROCHLORIDE 0.4 MILLIGRAM(S): 0.4 CAPSULE ORAL at 21:29

## 2020-01-19 RX ADMIN — Medication 1 APPLICATION(S): at 01:46

## 2020-01-19 RX ADMIN — Medication 40 MILLIEQUIVALENT(S): at 10:15

## 2020-01-19 RX ADMIN — Medication 3 MILLILITER(S): at 04:15

## 2020-01-19 RX ADMIN — LISINOPRIL 5 MILLIGRAM(S): 2.5 TABLET ORAL at 21:29

## 2020-01-19 RX ADMIN — Medication 3 MILLILITER(S): at 10:15

## 2020-01-19 RX ADMIN — Medication 1 APPLICATION(S): at 10:16

## 2020-01-19 RX ADMIN — Medication 0.5 MILLIGRAM(S): at 06:48

## 2020-01-19 RX ADMIN — Medication 100 GRAM(S): at 07:49

## 2020-01-19 RX ADMIN — Medication 3 MILLILITER(S): at 15:23

## 2020-01-19 RX ADMIN — AMLODIPINE BESYLATE 10 MILLIGRAM(S): 2.5 TABLET ORAL at 06:48

## 2020-01-19 RX ADMIN — Medication 3 MILLILITER(S): at 21:29

## 2020-01-19 NOTE — PROGRESS NOTE ADULT - SUBJECTIVE AND OBJECTIVE BOX
Internal medicine progress note    SUBJECTIVE / INTERVAL HPI: Patient seen and examined at bedside. The patient notes that he is breathing comfortably, he denies any wheezing or sputum production. The patient noted a small bruise on his inner thigh which he attributes to sitting in a "funny position" for an extended period of time yesterday. Otherwise no complaints.      VITAL SIGNS:  Vital Signs Last 24 Hrs  T(C): 36.2 (19 Jan 2020 08:39), Max: 36.7 (18 Jan 2020 21:15)  T(F): 97.2 (19 Jan 2020 08:39), Max: 98.1 (18 Jan 2020 21:15)  HR: 95 (19 Jan 2020 09:25) (89 - 104)  BP: 155/73 (19 Jan 2020 08:39) (138/75 - 178/92)  BP(mean): --  RR: 20 (19 Jan 2020 09:25) (18 - 28)  SpO2: 96% (19 Jan 2020 09:25) (93% - 100%)    PHYSICAL EXAM:    General: WD, Thin  HEENT: NC/AT; PERRL, anicteric sclera; MMM  Neck: supple  Cardiovascular: +S1/S2, RRR  Respiratory: CTA B/L; no W/R/R  Gastrointestinal: soft, NT/ND; +BSx4  Extremities: WWP; no edema, clubbing or cyanosis  Vascular: 2+ radial, DP/PT pulses B/L  Neurological: AAOx3; no focal deficits    MEDICATIONS:  MEDICATIONS  (STANDING):  albuterol/ipratropium for Nebulization 3 milliLiter(s) Nebulizer every 4 hours  amLODIPine   Tablet 10 milliGRAM(s) Oral daily  buDESOnide    Inhalation Suspension 0.5 milliGRAM(s) Inhalation every 12 hours  dextrose 5%. 1000 milliLiter(s) (50 mL/Hr) IV Continuous <Continuous>  dextrose 50% Injectable 12.5 Gram(s) IV Push once  dextrose 50% Injectable 25 Gram(s) IV Push once  dextrose 50% Injectable 25 Gram(s) IV Push once  enoxaparin Injectable 30 milliGRAM(s) SubCutaneous every 24 hours  insulin lispro (HumaLOG) corrective regimen sliding scale   SubCutaneous every 6 hours  lisinopril 5 milliGRAM(s) Oral every 24 hours  pantoprazole    Tablet 40 milliGRAM(s) Oral before breakfast  petrolatum Ophthalmic Ointment 1 Application(s) Both EYES three times a day  tamsulosin 0.4 milliGRAM(s) Oral at bedtime    MEDICATIONS  (PRN):  dextrose 40% Gel 15 Gram(s) Oral once PRN Blood Glucose LESS THAN 70 milliGRAM(s)/deciliter  glucagon  Injectable 1 milliGRAM(s) IntraMuscular once PRN Glucose LESS THAN 70 milligrams/deciliter  guaiFENesin   Syrup  (Sugar-Free) 100 milliGRAM(s) Oral every 6 hours PRN Cough  LORazepam   Injectable 0.5 milliGRAM(s) IV Push every 4 hours PRN Anxiety      ALLERGIES:  Allergies    No Known Allergies    Intolerances        LABS:                        12.9   10.06 )-----------( 346      ( 19 Jan 2020 06:24 )             40.4     01-19    138  |  95<L>  |  15  ----------------------------<  111<H>  3.4<L>   |  27  |  0.78    Ca    9.2      19 Jan 2020 06:24  Phos  2.6     01-19  Mg     1.8     01-19          CAPILLARY BLOOD GLUCOSE      POCT Blood Glucose.: 135 mg/dL (19 Jan 2020 12:00)      RADIOLOGY & ADDITIONAL TESTS: Reviewed.

## 2020-01-19 NOTE — PROGRESS NOTE ADULT - ASSESSMENT
This is a 75 y/o man w/ no formal diagnosis of COPD but severe hyperinflation on x-ray and extensive smoking history presented with acute hypoxemic hypercapnic respiratory failure 2/2 RSV infection requiring intubation. S/P course of ABX w/ azithro, doing well clinically  now. Currently requiring oxygen which is new from baseline, will need to be evaluated for home O2. Started on budesonide, plan will be to continue as an outpatient and add a LAMA upon discharge. Pt will need to work with PT.      1. Acute Hypoxemic Respiratory failure likely 2/2 underlying COPD (not formally diagnosed)  2. Likely COPD  3. Anxiety   4. Pre-Diabetes     Plan:  -Pt now requiring oxygen, he will need to be set up with home oxygen.   -Seen by PT, recommending home PT and outpatient pulmonary rehab, will discuss w/ social work   - This is a 73 y/o man w/ no formal diagnosis of COPD but severe hyperinflation on x-ray and extensive smoking history presented with acute hypoxemic hypercapnic respiratory failure 2/2 RSV infection requiring intubation. S/P course of ABX w/ azithro, doing well clinically  now. Currently requiring oxygen which is new from baseline, will need to be evaluated for home O2. Started on budesonide, plan will be to continue as an outpatient and add a LAMA upon discharge. Pt will need to work with PT.      1. Acute Hypoxemic Respiratory failure likely 2/2 underlying COPD (not formally diagnosed)  2. Likely COPD  3. Anxiety   4. Pre-Diabetes     Plan:  -Pt now requiring oxygen, he will need to be set up with home oxygen.   -Seen by PT, recommending home PT and outpatient pulmonary rehab, will discuss w/ social work   -Continue steroid taper  -Pt will need to continue inhaler upon discharge, can switch to symbicort and add LAMA based on what insurance will cover.   -Pt will follow with pulmonary upon discharge formal PFTs.   -Pt will need to follow w/ PMD regarding pre-diabetes.   -Pending dispo.

## 2020-01-20 DIAGNOSIS — R73.03 PREDIABETES: ICD-10-CM

## 2020-01-20 LAB
ANION GAP SERPL CALC-SCNC: 15 MMOL/L — SIGNIFICANT CHANGE UP (ref 5–17)
BUN SERPL-MCNC: 17 MG/DL — SIGNIFICANT CHANGE UP (ref 7–23)
CALCIUM SERPL-MCNC: 8.8 MG/DL — SIGNIFICANT CHANGE UP (ref 8.4–10.5)
CHLORIDE SERPL-SCNC: 92 MMOL/L — LOW (ref 96–108)
CO2 SERPL-SCNC: 29 MMOL/L — SIGNIFICANT CHANGE UP (ref 22–31)
CREAT SERPL-MCNC: 0.85 MG/DL — SIGNIFICANT CHANGE UP (ref 0.5–1.3)
GLUCOSE BLDC GLUCOMTR-MCNC: 147 MG/DL — HIGH (ref 70–99)
GLUCOSE BLDC GLUCOMTR-MCNC: 172 MG/DL — HIGH (ref 70–99)
GLUCOSE BLDC GLUCOMTR-MCNC: 93 MG/DL — SIGNIFICANT CHANGE UP (ref 70–99)
GLUCOSE BLDC GLUCOMTR-MCNC: 96 MG/DL — SIGNIFICANT CHANGE UP (ref 70–99)
GLUCOSE SERPL-MCNC: 113 MG/DL — HIGH (ref 70–99)
HCT VFR BLD CALC: 37.1 % — LOW (ref 39–50)
HGB BLD-MCNC: 11.7 G/DL — LOW (ref 13–17)
MAGNESIUM SERPL-MCNC: 1.9 MG/DL — SIGNIFICANT CHANGE UP (ref 1.6–2.6)
MCHC RBC-ENTMCNC: 29.2 PG — SIGNIFICANT CHANGE UP (ref 27–34)
MCHC RBC-ENTMCNC: 31.5 GM/DL — LOW (ref 32–36)
MCV RBC AUTO: 92.5 FL — SIGNIFICANT CHANGE UP (ref 80–100)
NRBC # BLD: 0 /100 WBCS — SIGNIFICANT CHANGE UP (ref 0–0)
PLATELET # BLD AUTO: 307 K/UL — SIGNIFICANT CHANGE UP (ref 150–400)
POTASSIUM SERPL-MCNC: 4.1 MMOL/L — SIGNIFICANT CHANGE UP (ref 3.5–5.3)
POTASSIUM SERPL-SCNC: 4.1 MMOL/L — SIGNIFICANT CHANGE UP (ref 3.5–5.3)
RBC # BLD: 4.01 M/UL — LOW (ref 4.2–5.8)
RBC # FLD: 13.2 % — SIGNIFICANT CHANGE UP (ref 10.3–14.5)
SODIUM SERPL-SCNC: 136 MMOL/L — SIGNIFICANT CHANGE UP (ref 135–145)
WBC # BLD: 8.67 K/UL — SIGNIFICANT CHANGE UP (ref 3.8–10.5)
WBC # FLD AUTO: 8.67 K/UL — SIGNIFICANT CHANGE UP (ref 3.8–10.5)

## 2020-01-20 PROCEDURE — 99233 SBSQ HOSP IP/OBS HIGH 50: CPT | Mod: GC

## 2020-01-20 RX ORDER — MAGNESIUM SULFATE 500 MG/ML
1 VIAL (ML) INJECTION ONCE
Refills: 0 | Status: COMPLETED | OUTPATIENT
Start: 2020-01-20 | End: 2020-01-20

## 2020-01-20 RX ADMIN — TAMSULOSIN HYDROCHLORIDE 0.4 MILLIGRAM(S): 0.4 CAPSULE ORAL at 22:04

## 2020-01-20 RX ADMIN — Medication 3 MILLILITER(S): at 13:28

## 2020-01-20 RX ADMIN — Medication 3 MILLILITER(S): at 10:36

## 2020-01-20 RX ADMIN — Medication 3 MILLILITER(S): at 00:06

## 2020-01-20 RX ADMIN — Medication 3 MILLILITER(S): at 22:04

## 2020-01-20 RX ADMIN — Medication 1 APPLICATION(S): at 05:21

## 2020-01-20 RX ADMIN — Medication 100 GRAM(S): at 12:46

## 2020-01-20 RX ADMIN — AMLODIPINE BESYLATE 10 MILLIGRAM(S): 2.5 TABLET ORAL at 05:19

## 2020-01-20 RX ADMIN — Medication 2: at 12:09

## 2020-01-20 RX ADMIN — Medication 3 MILLILITER(S): at 05:19

## 2020-01-20 RX ADMIN — PANTOPRAZOLE SODIUM 40 MILLIGRAM(S): 20 TABLET, DELAYED RELEASE ORAL at 05:20

## 2020-01-20 RX ADMIN — Medication 1 APPLICATION(S): at 00:05

## 2020-01-20 RX ADMIN — Medication 0.5 MILLIGRAM(S): at 17:56

## 2020-01-20 RX ADMIN — ENOXAPARIN SODIUM 30 MILLIGRAM(S): 100 INJECTION SUBCUTANEOUS at 17:55

## 2020-01-20 RX ADMIN — Medication 0.5 MILLIGRAM(S): at 05:39

## 2020-01-20 RX ADMIN — Medication 10 MILLIGRAM(S): at 05:20

## 2020-01-20 RX ADMIN — Medication 3 MILLILITER(S): at 17:56

## 2020-01-20 RX ADMIN — Medication 1 APPLICATION(S): at 08:42

## 2020-01-20 RX ADMIN — LISINOPRIL 5 MILLIGRAM(S): 2.5 TABLET ORAL at 22:04

## 2020-01-20 NOTE — PROGRESS NOTE ADULT - SUBJECTIVE AND OBJECTIVE BOX
Physical Medicine and Rehabilitation Progress Note:    Patient is a 74y old  Male who presents with a chief complaint of Hypercapnic respiratory failure 2/2 to RSV (19 Jan 2020 15:11)      HPI:  73 yo M, no pertinent PMHx, no allergies, no home medications BIBEMS c/o SOB, wheezing, difficulty breathing in the setting of flu-like symptoms x 5 days. Pt c/o cough, body aches. Per wife pt is not on anti-coagulants, beta-blockers, anti-hypertensive drugs. Pt smokes marijuana regularly. Pt states he has not seen a PMD in years. Pt's wife admits that he used a vaporizer for marijuana once recently, but has been using a vicks vaporizer since onset of symptoms. Patient intubated and admitted to the MICU for Hypercapnic respiratory failure due to RSV pneumonia, s/p trial of BIPAP with improvement in hypoxia but not ventilation, requiring intubation for impending respiratory failure, labs noted with hyponatremia, mild, likely due to volume depletion/sepsis. Given IV abx and sedatives in ED. (09 Jan 2020 23:00)                            11.7   8.67  )-----------( 307      ( 20 Jan 2020 06:50 )             37.1       01-20    136  |  92<L>  |  17  ----------------------------<  113<H>  4.1   |  29  |  0.85    Ca    8.8      20 Jan 2020 06:50  Phos  2.6     01-19  Mg     1.9     01-20      Vital Signs Last 24 Hrs  T(C): 36.5 (20 Jan 2020 08:40), Max: 37.1 (19 Jan 2020 16:53)  T(F): 97.7 (20 Jan 2020 08:40), Max: 98.7 (19 Jan 2020 16:53)  HR: 94 (20 Jan 2020 08:40) (94 - 103)  BP: 108/72 (20 Jan 2020 08:40) (108/72 - 152/82)  BP(mean): --  RR: 16 (20 Jan 2020 08:40) (16 - 18)  SpO2: 100% (20 Jan 2020 08:40) (95% - 100%)    MEDICATIONS  (STANDING):  albuterol/ipratropium for Nebulization 3 milliLiter(s) Nebulizer every 4 hours  amLODIPine   Tablet 10 milliGRAM(s) Oral daily  buDESOnide    Inhalation Suspension 0.5 milliGRAM(s) Inhalation every 12 hours  dextrose 5%. 1000 milliLiter(s) (50 mL/Hr) IV Continuous <Continuous>  dextrose 50% Injectable 12.5 Gram(s) IV Push once  dextrose 50% Injectable 25 Gram(s) IV Push once  dextrose 50% Injectable 25 Gram(s) IV Push once  enoxaparin Injectable 30 milliGRAM(s) SubCutaneous every 24 hours  insulin lispro (HumaLOG) corrective regimen sliding scale   SubCutaneous every 6 hours  lisinopril 5 milliGRAM(s) Oral every 24 hours  pantoprazole    Tablet 40 milliGRAM(s) Oral before breakfast  petrolatum Ophthalmic Ointment 1 Application(s) Both EYES three times a day  tamsulosin 0.4 milliGRAM(s) Oral at bedtime    MEDICATIONS  (PRN):  dextrose 40% Gel 15 Gram(s) Oral once PRN Blood Glucose LESS THAN 70 milliGRAM(s)/deciliter  glucagon  Injectable 1 milliGRAM(s) IntraMuscular once PRN Glucose LESS THAN 70 milligrams/deciliter  guaiFENesin   Syrup  (Sugar-Free) 100 milliGRAM(s) Oral every 6 hours PRN Cough  LORazepam   Injectable 0.5 milliGRAM(s) IV Push every 4 hours PRN Anxiety    Currently Undergoing Physical Therapy at bedside.    Functional Status Assessment: 1/18/2020    Previous Level of Function:     · Ambulation Skills	independent	  · Transfer Skills	independent	  · ADL Skills	independent	  · Work/Leisure Activity	independent	  · Additional Comments	Patient lives with his wife in an elevator apartment with no steps to enter. Prior to admission, patient was independent for all functional mobility and ADLs without assistive device. Patient denies history of falls and denies home health assistance.	    Cognitive Status Examination:   · Orientation	oriented to person, place, time and situation	  · Level of Consciousness	alert	  · Follows Commands and Answers Questions	100% of the time	  · Personal Safety and Judgment	intact	    Range of Motion Exam:   · Active Range of Motion Examination	bilateral upper extremity Active ROM was WFL (within functional limits); bilateral  lower extremity Active ROM was WFL (within functional limits)	    Manual Muscle Testing:   · Manual Muscle Testing Results	Patient's muscle strength grossly at least 3+/5 based on functional assessment of bed mobility, transfers and ambulation	    Muscle Tone Assessment:   · Muscle Tone Assessment	bilateral upper extremities; bilateral lower extremities; normal	    Bed Mobility: Rolling/Turning:     · Level of [place]	independent	    Bed Mobility: Scooting/Bridging:     · Level of [place]	independent	    Bed Mobility: Sit to Supine:     · Level of [place]	not observed, patient left sitting in bedside chair with all lines intact, +call bell, left as received, instructed to press call bell and await assistance should patient desire to get up or need anything, in no apparent distress.	    Bed Mobility: Supine to Sit:     · Level of [place]	independent	    Transfer: Sit to Stand:     · Level of [place]	supervision	  · Physical Assist/Nonphysical Assist	supervision	    Transfer: Stand to Sit:     · Level of [place]	supervision	  · Physical Assist/Nonphysical Assist	supervision	    Sit/Stand Transfer Safety Analysis:     · Transfer Safety Concerns Noted	mild unsteadiness	  · Impairments Contributing to Impaired Transfers	impaired balance; decreased strength	    Gait Skills:     · Level of [place]	contact guard; minimum assist (75% patients effort)	  · Physical Assist/Nonphysical Assist	1 person assist; verbal cues	  · Gait Distance	15 feet (limited by length of HFNC tubing)	    Gait Analysis:     · Gait Pattern Used	swing-through gait	  · Gait Deviations Noted	decreased pamella; increased time in double stance; decreased step length	  · Impairments Contributing to Gait Deviations	impaired balance; decreased strength	    Stair Negotiation:     · Level of [place]	TBA	    Balance Skills Assessment:     · Sitting Balance: Static	good balance	  · Sitting Balance: Dynamic	good balance	  · Standing Balance: Static	good minus	  · Standing Balance: Dynamic	fair plus	  · Systems Impairment Contributing to Balance Disturbance	musculoskeletal	  · Identified Impairments Contributing to Balance Disturbance	decreased strength	    Treatment Location:   · Comments	FIM locomotion 1; FIM stairs TBA	    Clinical Impressions:   · Criteria for Skilled Therapeutic Interventions	impairments found; functional limitations in following categories; risk reduction/prevention; rehab potential; therapy frequency; anticipated discharge recommendation	  · Impairments Found (describe specific impairments)	aerobic capacity/endurance; gait, locomotion, and balance; muscle strength; gross motor; ventilation and respiration/gas exchange	  · Functional Limitations in Following Categories (describe specific limitations)	self-care; home management; community/leisure	  · Rehab Potential	good, to achieve stated therapy goals	  · Therapy Frequency	daily; COPD bundle	            PM&R Impression: as above    Current Disposition Plan Recommendations:  d/c home, outpatient pulmonary rehab

## 2020-01-20 NOTE — PROGRESS NOTE ADULT - PROBLEM SELECTOR PLAN 3
Patient presented SOB, wheezing, RSV+, intubated for acute hypercapnic respiratory failure. Chest xray demonstrated enlarged lungs along with extensive smoking history.  - Pulm consulted for evaluation for obstructive lung disease in setting of severe hypercapnic respiratory failure requiring intubation  - today on last day of prednisone taper (5mg)  - Completed Azithromycin 250 for antiinflammatory affects  - c/w Duonebs q4hr standing  - C/w budesonide 0.5mg BID
Patient presented SOB, wheezing, RSV+, intubated for acute hypercapnic respiratory failure. Chest xray demonstrated enlarged lungs along with extensive smoking history.  - Pulm consulted for evaluation for obstructive lung disease in setting of severe hypercapnic respiratory failure requiring intubation  - Switched solumedrol to prednisone 40mg daily, start taper as above tomorrow 1/18  - Completed Azithromycin 250 for antiinflammatory affects  - c/w Duonebs q4hr standing  - C/w budesonide 0.5mg BID
Patient presented SOB, wheezing, RSV+, intubated for acute hypercapnic respiratory failure. Chest xray demonstrated enlarged lungs along with extensive smoking history.  - Pulm consulted for evaluation for obstructive lung disease in setting of severe hypercapnic respiratory failure requiring intubation  - Switched solumedrol to prednisone 40mg daily  - Completed Azithromycin 250 for antiinflammatory affects  - c/w Duonebs q4hr standing  - C/w budesonide 0.5mg BID
Patient presented SOB, wheezing, RSV+, intubated for acute hypercapnic respiratory failure. Chest xray demonstrated enlarged lungs along with extensive smoking history.  - Pulm consulted for evaluation for obstructive lung disease in setting of severe hypercapnic respiratory failure requiring intubation  - Switched solumedrol to prednisone 40mg daily, start taper as above tomorrow 1/18  - Completed Azithromycin 250 for antiinflammatory affects  - c/w Duonebs q4hr standing  - C/w budesonide 0.5mg BID
Patient presented SOB, wheezing, RSV+, intubated for acute hypercapnic respiratory failure. Chest xray demonstrated enlarged lungs along with extensive smoking history.  - Pulm consulted for evaluation for obstructive lung disease in setting of severe hypercapnic respiratory failure requiring intubation  - Switched solumedrol to prednisone 40mg daily, start taper as above tomorrow 1/18  - Completed Azithromycin 250 for antiinflammatory affects  - c/w Duonebs q4hr standing  - C/w budesonide 0.5mg BID

## 2020-01-20 NOTE — PROGRESS NOTE ADULT - SUBJECTIVE AND OBJECTIVE BOX
OVERNIGHT EVENTS: As per overnight staff, there were no acute events overnight    SUBJECTIVE HPI: Patient seen and examined at bedside. Patient resting comfortably, no complaints at this time. Continues to endorse feeling much better.      VITAL SIGNS:  Vital Signs Last 24 Hrs  T(C): 36.5 (20 Jan 2020 08:40), Max: 37.1 (19 Jan 2020 16:53)  T(F): 97.7 (20 Jan 2020 08:40), Max: 98.7 (19 Jan 2020 16:53)  HR: 94 (20 Jan 2020 08:40) (94 - 103)  BP: 108/72 (20 Jan 2020 08:40) (108/72 - 152/82)  BP(mean): --  RR: 16 (20 Jan 2020 08:40) (16 - 18)  SpO2: 100% (20 Jan 2020 08:40) (95% - 100%)      PHYSICAL EXAM:  General: thin, NAD  Neurological: AAOx3, no focal deficits  HEENT: clear conjunctiva, no nasopharyngeal exudates or erythema; MMM  Neck: supple, small mobile nontender R submandibular lymph node  Cardiovascular: +S1/S2, no murmurs/rubs/gallops, RRR  Respiratory: on nasal cannula, speaking in full sentences, CTA B/L, no wheezes/rales/rhonchi, diminished breath sounds at b/l bases, no increased work of breathing or accessory muscle use  Gastrointestinal: soft, NT/ND; active BSx4 quadrants  Genitourinary: no suprapubic tenderness  Extremities: muscle wasting/thin extremities, WWP; no edema, clubbing or cyanosis  Vascular: 2+ radial, DP/PT pulses B/L      MEDICATIONS:  MEDICATIONS  (STANDING):  albuterol/ipratropium for Nebulization 3 milliLiter(s) Nebulizer every 4 hours  amLODIPine   Tablet 10 milliGRAM(s) Oral daily  buDESOnide    Inhalation Suspension 0.5 milliGRAM(s) Inhalation every 12 hours  dextrose 5%. 1000 milliLiter(s) (50 mL/Hr) IV Continuous <Continuous>  dextrose 50% Injectable 12.5 Gram(s) IV Push once  dextrose 50% Injectable 25 Gram(s) IV Push once  dextrose 50% Injectable 25 Gram(s) IV Push once  enoxaparin Injectable 30 milliGRAM(s) SubCutaneous every 24 hours  insulin lispro (HumaLOG) corrective regimen sliding scale   SubCutaneous every 6 hours  lisinopril 5 milliGRAM(s) Oral every 24 hours  pantoprazole    Tablet 40 milliGRAM(s) Oral before breakfast  petrolatum Ophthalmic Ointment 1 Application(s) Both EYES three times a day  tamsulosin 0.4 milliGRAM(s) Oral at bedtime    MEDICATIONS  (PRN):  dextrose 40% Gel 15 Gram(s) Oral once PRN Blood Glucose LESS THAN 70 milliGRAM(s)/deciliter  glucagon  Injectable 1 milliGRAM(s) IntraMuscular once PRN Glucose LESS THAN 70 milligrams/deciliter  guaiFENesin   Syrup  (Sugar-Free) 100 milliGRAM(s) Oral every 6 hours PRN Cough  LORazepam   Injectable 0.5 milliGRAM(s) IV Push every 4 hours PRN Anxiety      ALLERGIES/INTOLERANCES:  Allergies: No Known Allergies      LABS:                        11.7   8.67  )-----------( 307      ( 20 Jan 2020 06:50 )             37.1     01-20    136  |  92<L>  |  17  ----------------------------<  113<H>  4.1   |  29  |  0.85    Ca    8.8      20 Jan 2020 06:50  Phos  2.6     01-19  Mg     1.9     01-20      CAPILLARY BLOOD GLUCOSE  POCT Blood Glucose.: 96 mg/dL (20 Jan 2020 06:04)      RADIOLOGY, EKG AND ADDITIONAL TESTS: Reviewed.

## 2020-01-20 NOTE — PROGRESS NOTE ADULT - PROBLEM SELECTOR PLAN 8
F: none  E: replete prn  N: Regular mechanical soft thin liquid diet with Ensure Enlive BID    DVT ppx: lovenox 30mg daily  GI ppx: pantoprazole 40mg daily while on prednisone  Code: FULL  Dispo: [facility]
F: none  E: replete prn  N: Regular mechanical soft thin liquid diet with Ensure Enlive BID    DVT ppx: lovenox 30mg daily  GI ppx: pantoprazole 40mg daily while on prednisone  Code: FULL  Dispo: [facility]

## 2020-01-20 NOTE — PROGRESS NOTE ADULT - PROBLEM SELECTOR PROBLEM 3
R/O Chronic obstructive pulmonary disease, unspecified COPD type

## 2020-01-20 NOTE — PROGRESS NOTE ADULT - PROBLEM SELECTOR PLAN 7
Pt endorsing urinary hesitancy and difficulty maintaining stream. Never assessed for BPH.  -c/w tamsulosin 0.4mg QD  -monitor pts response to medication
Pt endorsing urinary hesitancy and difficulty maintaining stream. Never assessed for BPH.  -starting tamsulosin 0.4mg QD  -monitor pts response to medication

## 2020-01-20 NOTE — PROGRESS NOTE ADULT - PROBLEM SELECTOR PLAN 6
Pt requiring sliding scale insulin for hyperglycemia. HbA1c 6.1  -c/w SSI  -pt counseled on lifestyle modifications, will continue to follow up as outpatient.

## 2020-01-20 NOTE — PROGRESS NOTE ADULT - PROBLEM SELECTOR PLAN 2
RSV+ on presentation. Likely nidus for respiratory failure in the setting of likely COPD and chronic smoker.  - Treatment for respiratory failure as above  - Supportive treatment prn
Patient presented SOB, wheezing, RSV+ in setting of possible COPD and chronic smoker. Intubated for acute hypercapnic respiratory failure likely 2/2 COPD exacerbation 2/2 RSV infection.  - Treatment for respiratory failure as above  - Supportive treatment prn
RSV+ on presentation. Likely nidus for respiratory failure in the setting of likely COPD and chronic smoker.  - Treatment for respiratory failure as above  - Supportive treatment prn

## 2020-01-20 NOTE — PROGRESS NOTE ADULT - PROBLEM SELECTOR PLAN 9
1) PCP Contacted on Admission: (Y/N) --> Name & Phone #: pt without PCP, will coordinate care on discharge.
1) PCP Contacted on Admission: (Y/N) --> Name & Phone #: pt without PCP, will coordinate care on discharge.

## 2020-01-20 NOTE — PROGRESS NOTE ADULT - PROBLEM SELECTOR PLAN 5
Hgb 15 on admission now 10.4, normocytic. No active sign of bleed on exam. Pt denying lightheadedness, fatigue, palpitations, SOB. Likely 2/2 poor nutritional intake in setting of hospitalization for severe acute illness.  - Hb stable in 11s-12s, likely ACD.  - no active signs of bleeding, will continue to monitor  - Monitor daily CBC

## 2020-01-20 NOTE — PROGRESS NOTE ADULT - PROBLEM SELECTOR PLAN 4
No known hx of HTN (has not been to doctor since 16yo), been persistently hypertensive SBP~160s. Patient asx, denying lightheadedness, dizziness, changes in vision.   - c/w amlodipine 10mg and lisinopril 5mg. Will continue to monitor blood pressure.  - Pt will need outpatient management of BP

## 2020-01-20 NOTE — PROGRESS NOTE ADULT - ASSESSMENT
per Internal Medicine    This is a 73 y/o man w/ no formal diagnosis of COPD but severe hyperinflation on x-ray and extensive smoking history presented with acute hypoxemic hypercapnic respiratory failure 2/2 RSV infection requiring intubation. S/P course of ABX w/ azithro, doing well clinically  now. Currently requiring oxygen which is new from baseline, will need to be evaluated for home O2. Started on budesonide, plan will be to continue as an outpatient and add a LAMA upon discharge. Pt will need to work with PT.      1. Acute Hypoxemic Respiratory failure likely 2/2 underlying COPD (not formally diagnosed)  2. Likely COPD  3. Anxiety   4. Pre-Diabetes     Plan:  -Pt now requiring oxygen, he will need to be set up with home oxygen.   -Seen by PT, recommending home PT and outpatient pulmonary rehab, will discuss w/ social work   -Continue steroid taper  -Pt will need to continue inhaler upon discharge, can switch to symbicort and add LAMA based on what insurance will cover.   -Pt will follow with pulmonary upon discharge formal PFTs.   -Pt will need to follow w/ PMD regarding pre-diabetes.   -Pending dispo.

## 2020-01-21 VITALS
HEART RATE: 101 BPM | SYSTOLIC BLOOD PRESSURE: 145 MMHG | TEMPERATURE: 98 F | DIASTOLIC BLOOD PRESSURE: 71 MMHG | RESPIRATION RATE: 18 BRPM | OXYGEN SATURATION: 94 %

## 2020-01-21 LAB
ANION GAP SERPL CALC-SCNC: 13 MMOL/L — SIGNIFICANT CHANGE UP (ref 5–17)
BUN SERPL-MCNC: 18 MG/DL — SIGNIFICANT CHANGE UP (ref 7–23)
CALCIUM SERPL-MCNC: 8.5 MG/DL — SIGNIFICANT CHANGE UP (ref 8.4–10.5)
CHLORIDE SERPL-SCNC: 96 MMOL/L — SIGNIFICANT CHANGE UP (ref 96–108)
CO2 SERPL-SCNC: 28 MMOL/L — SIGNIFICANT CHANGE UP (ref 22–31)
CREAT SERPL-MCNC: 0.93 MG/DL — SIGNIFICANT CHANGE UP (ref 0.5–1.3)
GLUCOSE BLDC GLUCOMTR-MCNC: 83 MG/DL — SIGNIFICANT CHANGE UP (ref 70–99)
GLUCOSE BLDC GLUCOMTR-MCNC: 96 MG/DL — SIGNIFICANT CHANGE UP (ref 70–99)
GLUCOSE SERPL-MCNC: 103 MG/DL — HIGH (ref 70–99)
HCT VFR BLD CALC: 35.9 % — LOW (ref 39–50)
HGB BLD-MCNC: 11.8 G/DL — LOW (ref 13–17)
MAGNESIUM SERPL-MCNC: 2.1 MG/DL — SIGNIFICANT CHANGE UP (ref 1.6–2.6)
MCHC RBC-ENTMCNC: 30.1 PG — SIGNIFICANT CHANGE UP (ref 27–34)
MCHC RBC-ENTMCNC: 32.9 GM/DL — SIGNIFICANT CHANGE UP (ref 32–36)
MCV RBC AUTO: 91.6 FL — SIGNIFICANT CHANGE UP (ref 80–100)
NRBC # BLD: 0 /100 WBCS — SIGNIFICANT CHANGE UP (ref 0–0)
PHOSPHATE SERPL-MCNC: 3.7 MG/DL — SIGNIFICANT CHANGE UP (ref 2.5–4.5)
PLATELET # BLD AUTO: 324 K/UL — SIGNIFICANT CHANGE UP (ref 150–400)
POTASSIUM SERPL-MCNC: 4.2 MMOL/L — SIGNIFICANT CHANGE UP (ref 3.5–5.3)
POTASSIUM SERPL-SCNC: 4.2 MMOL/L — SIGNIFICANT CHANGE UP (ref 3.5–5.3)
RBC # BLD: 3.92 M/UL — LOW (ref 4.2–5.8)
RBC # FLD: 13.6 % — SIGNIFICANT CHANGE UP (ref 10.3–14.5)
SODIUM SERPL-SCNC: 137 MMOL/L — SIGNIFICANT CHANGE UP (ref 135–145)
WBC # BLD: 10.22 K/UL — SIGNIFICANT CHANGE UP (ref 3.8–10.5)
WBC # FLD AUTO: 10.22 K/UL — SIGNIFICANT CHANGE UP (ref 3.8–10.5)

## 2020-01-21 PROCEDURE — 95700 EEG CONT REC W/VID EEG TECH: CPT

## 2020-01-21 PROCEDURE — 99292 CRITICAL CARE ADDL 30 MIN: CPT | Mod: 25

## 2020-01-21 PROCEDURE — 31500 INSERT EMERGENCY AIRWAY: CPT

## 2020-01-21 PROCEDURE — 93005 ELECTROCARDIOGRAM TRACING: CPT | Mod: XU

## 2020-01-21 PROCEDURE — 80053 COMPREHEN METABOLIC PANEL: CPT

## 2020-01-21 PROCEDURE — 87631 RESP VIRUS 3-5 TARGETS: CPT

## 2020-01-21 PROCEDURE — 84466 ASSAY OF TRANSFERRIN: CPT

## 2020-01-21 PROCEDURE — 96374 THER/PROPH/DIAG INJ IV PUSH: CPT | Mod: XU

## 2020-01-21 PROCEDURE — 85730 THROMBOPLASTIN TIME PARTIAL: CPT

## 2020-01-21 PROCEDURE — 83880 ASSAY OF NATRIURETIC PEPTIDE: CPT

## 2020-01-21 PROCEDURE — 84478 ASSAY OF TRIGLYCERIDES: CPT

## 2020-01-21 PROCEDURE — 82553 CREATINE MB FRACTION: CPT

## 2020-01-21 PROCEDURE — 70450 CT HEAD/BRAIN W/O DYE: CPT

## 2020-01-21 PROCEDURE — 95716 VEEG EA 12-26HR CONT MNTR: CPT

## 2020-01-21 PROCEDURE — 82962 GLUCOSE BLOOD TEST: CPT

## 2020-01-21 PROCEDURE — 95714 VEEG EA 12-26 HR UNMNTR: CPT

## 2020-01-21 PROCEDURE — 83550 IRON BINDING TEST: CPT

## 2020-01-21 PROCEDURE — 84100 ASSAY OF PHOSPHORUS: CPT

## 2020-01-21 PROCEDURE — 87070 CULTURE OTHR SPECIMN AEROBIC: CPT

## 2020-01-21 PROCEDURE — 97162 PT EVAL MOD COMPLEX 30 MIN: CPT

## 2020-01-21 PROCEDURE — 93306 TTE W/DOPPLER COMPLETE: CPT

## 2020-01-21 PROCEDURE — 36415 COLL VENOUS BLD VENIPUNCTURE: CPT

## 2020-01-21 PROCEDURE — 83540 ASSAY OF IRON: CPT

## 2020-01-21 PROCEDURE — 82803 BLOOD GASES ANY COMBINATION: CPT

## 2020-01-21 PROCEDURE — 87040 BLOOD CULTURE FOR BACTERIA: CPT

## 2020-01-21 PROCEDURE — 99239 HOSP IP/OBS DSCHRG MGMT >30: CPT | Mod: GC

## 2020-01-21 PROCEDURE — 83036 HEMOGLOBIN GLYCOSYLATED A1C: CPT

## 2020-01-21 PROCEDURE — 83735 ASSAY OF MAGNESIUM: CPT

## 2020-01-21 PROCEDURE — 85025 COMPLETE CBC W/AUTO DIFF WBC: CPT

## 2020-01-21 PROCEDURE — 71045 X-RAY EXAM CHEST 1 VIEW: CPT

## 2020-01-21 PROCEDURE — 87086 URINE CULTURE/COLONY COUNT: CPT

## 2020-01-21 PROCEDURE — 84295 ASSAY OF SERUM SODIUM: CPT

## 2020-01-21 PROCEDURE — 99291 CRITICAL CARE FIRST HOUR: CPT | Mod: 25

## 2020-01-21 PROCEDURE — 97110 THERAPEUTIC EXERCISES: CPT

## 2020-01-21 PROCEDURE — 80048 BASIC METABOLIC PNL TOTAL CA: CPT

## 2020-01-21 PROCEDURE — 81001 URINALYSIS AUTO W/SCOPE: CPT

## 2020-01-21 PROCEDURE — 96375 TX/PRO/DX INJ NEW DRUG ADDON: CPT | Mod: XU

## 2020-01-21 PROCEDURE — 82330 ASSAY OF CALCIUM: CPT

## 2020-01-21 PROCEDURE — 97530 THERAPEUTIC ACTIVITIES: CPT

## 2020-01-21 PROCEDURE — 82550 ASSAY OF CK (CPK): CPT

## 2020-01-21 PROCEDURE — 85027 COMPLETE CBC AUTOMATED: CPT

## 2020-01-21 PROCEDURE — 83605 ASSAY OF LACTIC ACID: CPT

## 2020-01-21 PROCEDURE — 94003 VENT MGMT INPAT SUBQ DAY: CPT

## 2020-01-21 PROCEDURE — 99232 SBSQ HOSP IP/OBS MODERATE 35: CPT | Mod: GC

## 2020-01-21 PROCEDURE — 94002 VENT MGMT INPAT INIT DAY: CPT

## 2020-01-21 PROCEDURE — 82728 ASSAY OF FERRITIN: CPT

## 2020-01-21 PROCEDURE — 94660 CPAP INITIATION&MGMT: CPT

## 2020-01-21 PROCEDURE — 84484 ASSAY OF TROPONIN QUANT: CPT

## 2020-01-21 PROCEDURE — 94640 AIRWAY INHALATION TREATMENT: CPT

## 2020-01-21 PROCEDURE — 85610 PROTHROMBIN TIME: CPT

## 2020-01-21 PROCEDURE — 84132 ASSAY OF SERUM POTASSIUM: CPT

## 2020-01-21 RX ORDER — AMLODIPINE BESYLATE 2.5 MG/1
1 TABLET ORAL
Qty: 30 | Refills: 0
Start: 2020-01-21 | End: 2020-02-19

## 2020-01-21 RX ORDER — TIOTROPIUM BROMIDE AND OLODATEROL 3.124; 2.736 UG/1; UG/1
2 SPRAY, METERED RESPIRATORY (INHALATION)
Qty: 1 | Refills: 0
Start: 2020-01-21 | End: 2020-02-19

## 2020-01-21 RX ORDER — BUDESONIDE, MICRONIZED 100 %
2 POWDER (GRAM) MISCELLANEOUS
Qty: 1 | Refills: 0
Start: 2020-01-21 | End: 2020-02-19

## 2020-01-21 RX ORDER — UMECLIDINIUM BROMIDE AND VILANTEROL TRIFENATATE 62.5; 25 UG/1; UG/1
1 POWDER RESPIRATORY (INHALATION)
Qty: 1 | Refills: 0
Start: 2020-01-21 | End: 2020-02-19

## 2020-01-21 RX ORDER — TAMSULOSIN HYDROCHLORIDE 0.4 MG/1
1 CAPSULE ORAL
Qty: 30 | Refills: 0
Start: 2020-01-21 | End: 2020-02-19

## 2020-01-21 RX ORDER — LISINOPRIL 2.5 MG/1
1 TABLET ORAL
Qty: 30 | Refills: 0
Start: 2020-01-21 | End: 2020-02-19

## 2020-01-21 RX ORDER — SALMETEROL XINAFOATE 50 MCG
1 BLISTER, WITH INHALATION DEVICE INHALATION
Qty: 1 | Refills: 0
Start: 2020-01-21 | End: 2020-02-19

## 2020-01-21 RX ORDER — TIOTROPIUM BROMIDE 18 UG/1
2 CAPSULE ORAL; RESPIRATORY (INHALATION)
Qty: 1 | Refills: 0
Start: 2020-01-21 | End: 2020-02-19

## 2020-01-21 RX ADMIN — Medication 3 MILLILITER(S): at 06:43

## 2020-01-21 RX ADMIN — Medication 1 APPLICATION(S): at 01:25

## 2020-01-21 RX ADMIN — Medication 3 MILLILITER(S): at 18:08

## 2020-01-21 RX ADMIN — PANTOPRAZOLE SODIUM 40 MILLIGRAM(S): 20 TABLET, DELAYED RELEASE ORAL at 06:43

## 2020-01-21 RX ADMIN — Medication 5 MILLIGRAM(S): at 06:43

## 2020-01-21 RX ADMIN — Medication 3 MILLILITER(S): at 09:38

## 2020-01-21 RX ADMIN — Medication 0.5 MILLIGRAM(S): at 06:43

## 2020-01-21 RX ADMIN — Medication 3 MILLILITER(S): at 13:14

## 2020-01-21 RX ADMIN — Medication 3 MILLILITER(S): at 01:25

## 2020-01-21 RX ADMIN — Medication 0.5 MILLIGRAM(S): at 18:08

## 2020-01-21 RX ADMIN — ENOXAPARIN SODIUM 30 MILLIGRAM(S): 100 INJECTION SUBCUTANEOUS at 18:08

## 2020-01-21 RX ADMIN — Medication 1 APPLICATION(S): at 06:44

## 2020-01-21 RX ADMIN — Medication 1 APPLICATION(S): at 09:38

## 2020-01-21 RX ADMIN — AMLODIPINE BESYLATE 10 MILLIGRAM(S): 2.5 TABLET ORAL at 06:43

## 2020-01-21 NOTE — DISCHARGE NOTE NURSING/CASE MANAGEMENT/SOCIAL WORK - NSDCFUADDAPPT_GEN_ALL_CORE_FT
Please follow up with either Dr. Valenzuela or Dr. Alexander to follow up on your recent diagnosis of COPD and your recent hospitalization.    While uninsured, you may go to [facility] or [facility] for primary care (to their medicine outpatient clinic). If you would like, after you have received health insurance you may follow up with [facility] for primary care.

## 2020-01-21 NOTE — PROGRESS NOTE ADULT - PROBLEM SELECTOR PROBLEM 1
Acute hypercapnic respiratory failure
Chronic obstructive pulmonary disease with acute exacerbation
Acute hypercapnic respiratory failure
Chronic obstructive pulmonary disease with acute exacerbation

## 2020-01-21 NOTE — DISCHARGE NOTE NURSING/CASE MANAGEMENT/SOCIAL WORK - PATIENT PORTAL LINK FT
You can access the FollowMyHealth Patient Portal offered by [facility] by registering at the following website: [email or web address]. By joining [employer]’s FollowMyHealth portal, you will also be able to view your health information using other applications (apps) compatible with our system.

## 2020-01-21 NOTE — DISCHARGE NOTE PROVIDER - NSDCMRMEDTOKEN_GEN_ALL_CORE_FT
amLODIPine 10 mg oral tablet: 1 tab(s) orally once a day  Anoro Ellipta 62.5 mcg-25 mcg/inh inhalation powder: 1 puff(s) inhaled once a day   lisinopril 5 mg oral tablet: 1 tab(s) orally every 24 hours  Pulmicort Flexhaler 90 mcg/inh inhalation powder: 2 puff(s) inhaled 2 times a day   tamsulosin 0.4 mg oral capsule: 1 cap(s) orally once a day (at bedtime) amLODIPine 10 mg oral tablet: 1 tab(s) orally once a day  lisinopril 5 mg oral tablet: 1 tab(s) orally every 24 hours  Serevent Diskus 50 mcg inhalation powder: 1 puff(s) inhaled 2 times a day   Spiriva Respimat 60 ACT 2.5 mcg/inh inhalation aerosol: 2 puff(s) inhaled once a day   tamsulosin 0.4 mg oral capsule: 1 cap(s) orally once a day (at bedtime)

## 2020-01-21 NOTE — PROGRESS NOTE ADULT - ASSESSMENT
75 yo M, no pertinent PMHx, no allergies, no home medications initially admitted for hypercapneic and hypoxemic respiratory failure attributed to COPD exacerbation in the setting of RSV infections. He was extubated on 1/14 and has now been transferred to the step-down unit. Consult for COPD management

## 2020-01-21 NOTE — DISCHARGE NOTE PROVIDER - NSDCCPCAREPLAN_GEN_ALL_CORE_FT
58F hx of gout presents with gout to L 1st toe worsening pain x4 days is able to ambulate, no trauma, falls, took 200mg motrin prior to ED arrival. Denies n/v/f/c/cp/sob. Denies headache, syncope, lightheadedness, dizziness. Denies chest palpitations, abdominal pain. Denies dysuria, hematuria, hematochezia, BRBPR, tarry stools, diarrhea, constipation. PRINCIPAL DISCHARGE DIAGNOSIS  Diagnosis: Respiratory failure requiring intubation  Assessment and Plan of Treatment: When you came to the hospital you were having difficulty breathing, and you were found to have respiratory failure. You were intubated and monitored in our intensive care unit due to the severity of your condition. We believe that the reason for your respiratory failure was due to RSV (a type of virus), which triggered an exacerbation of your underlying COPD (see below). While in the hospital you were treated with antibiotics, steroids, and COPD medications with marked improvement. Please see the COPD section below for management of your COPD.      SECONDARY DISCHARGE DIAGNOSES  Diagnosis: Chronic obstructive pulmonary disease with acute exacerbation  Assessment and Plan of Treatment: The cause of your respiratory failure was likely due to COPD (chronic obstructive pulmonary disease). COPD is a lung conditition that is caused by lung damage from chronic irritation and inflammation from smoking. COPD is a serious condition that can get worse over time. However, there are measures you can take to help you feel better and prevent exacerbations. These measure include smoking cessation (if you have not already), avoiding others who smoke, exercising for at least 20 minutes a day, annual influenza vaccine, and a pneumonia vaccine (if you have not received this already). When you feel short of breath, take a deep breath through your nose and slowly breath out through your mouth with your lips pursed for twice as long as you inhaled. It is important for you to take your medication as directed since it can prevent future attacks.   -Please take anoro ellipta (1 puff once daily) and pulmicort (2 puffs twice daily) every day to manage your COPD.  -Please follow up with your pulmonologist within 1-2 weeks of discharge to follow up and ensure your COPD is adequately controlled.  -Please return to the emergency department should have symptoms such as but not limited to: severe shortness of breath, wheezing, cough, coughing up blood, vomiting associated with cough, chest pain.      Diagnosis: Urinary hesitancy  Assessment and Plan of Treatment: While in the hospital you reported that you were having difficulty initiating urination. You were started on tamsulosin to assist with this. Please continue to take tamsulosin once a day at bedtime and follow up with your primary care doctor for further assessment.    Diagnosis: Hypertension  Assessment and Plan of Treatment: While in the hospital you were found to have high blood pressure (hypertension). Hypertension can cause damage to your heart and kidneys and increases your risk of heart attack and stroke.   We have started you on medications to control your blood pressure during your hospitalization, which you will continue after discharge.  -Please continue to take lisinopril 5mg once a day, and amlodipine 10mg once a day.  -Be sure to follow up with your primary care physician on a regular basis to make sure your blood pressure continues to be well controlled. If you experience symptoms such as but not limited to: sudden onset blurry vision, nausea, vomiting, chest pain, shortness of breath, or palpitations, please go to the nearest emergency room.    Diagnosis: Pre-diabetes  Assessment and Plan of Treatment: While in the hospital you were found to be pre-diabetic with a blood test, which means you are at risk of developing diabetes. Diabetes is a condition that results from blood sugar levels getting too high because your body is more resistant to insulin. Uncontrolled blood sugar levels can lead to kidney and heart damage, pain/numbness/paralysis in your hands and feet, and increased rates of infections. There is no medication you need to take for pre-diabetes, but it is important to make lifestyle changes to better control your blood sugar. Please follow up with your primary care dotor to follow up on your blood sugar.    Diagnosis: Respiratory failure requiring intubation  Assessment and Plan of Treatment: PRINCIPAL DISCHARGE DIAGNOSIS  Diagnosis: Respiratory failure requiring intubation  Assessment and Plan of Treatment: When you came to the hospital you were having difficulty breathing, and you were found to have respiratory failure. You were intubated and monitored in our intensive care unit due to the severity of your condition. We believe that the reason for your respiratory failure was due to RSV (a type of virus), which triggered an exacerbation of your underlying COPD (see below). While in the hospital you were treated with antibiotics, steroids, and COPD medications with marked improvement. Please see the COPD section below for management of your COPD.      SECONDARY DISCHARGE DIAGNOSES  Diagnosis: Chronic obstructive pulmonary disease with acute exacerbation  Assessment and Plan of Treatment: The cause of your respiratory failure was likely due to COPD (chronic obstructive pulmonary disease). COPD is a lung conditition that is caused by lung damage from chronic irritation and inflammation from smoking. COPD is a serious condition that can get worse over time. However, there are measures you can take to help you feel better and prevent exacerbations. These measure include smoking cessation (if you have not already), avoiding others who smoke, exercising for at least 20 minutes a day, annual influenza vaccine, and a pneumonia vaccine (if you have not received this already). When you feel short of breath, take a deep breath through your nose and slowly breath out through your mouth with your lips pursed for twice as long as you inhaled. It is important for you to take your medication as directed since it can prevent future attacks.   -Please take spiriva (2 puffs once daily) and serevent (1 puff twice daily) every day to manage your COPD.  -Please follow up with your pulmonologist within 1-2 weeks of discharge to follow up and ensure your COPD is adequately controlled.  -Please return to the emergency department should have symptoms such as but not limited to: severe shortness of breath, wheezing, cough, coughing up blood, vomiting associated with cough, chest pain.      Diagnosis: Urinary hesitancy  Assessment and Plan of Treatment: While in the hospital you reported that you were having difficulty initiating urination. You were started on tamsulosin to assist with this. Please continue to take tamsulosin once a day at bedtime and follow up with your primary care doctor for further assessment.    Diagnosis: Hypertension  Assessment and Plan of Treatment: While in the hospital you were found to have high blood pressure (hypertension). Hypertension can cause damage to your heart and kidneys and increases your risk of heart attack and stroke.   We have started you on medications to control your blood pressure during your hospitalization, which you will continue after discharge.  -Please continue to take lisinopril 5mg once a day, and amlodipine 10mg once a day.  -Be sure to follow up with your primary care physician on a regular basis to make sure your blood pressure continues to be well controlled. If you experience symptoms such as but not limited to: sudden onset blurry vision, nausea, vomiting, chest pain, shortness of breath, or palpitations, please go to the nearest emergency room.    Diagnosis: Pre-diabetes  Assessment and Plan of Treatment: While in the hospital you were found to be pre-diabetic with a blood test, which means you are at risk of developing diabetes. Diabetes is a condition that results from blood sugar levels getting too high because your body is more resistant to insulin. Uncontrolled blood sugar levels can lead to kidney and heart damage, pain/numbness/paralysis in your hands and feet, and increased rates of infections. There is no medication you need to take for pre-diabetes, but it is important to make lifestyle changes to better control your blood sugar. Please follow up with your primary care dotor to follow up on your blood sugar.    Diagnosis: Respiratory failure requiring intubation  Assessment and Plan of Treatment:

## 2020-01-21 NOTE — PROGRESS NOTE ADULT - ATTENDING COMMENTS
Fentanyl added for vent synchrony and steroids increased with refractory bronchospasm. Levo started secondary to increased sedation.
Patient seen and examined with house-staff during bedside rounds.  Resident note read, including vitals, physical findings, laboratory data, and radiological reports.   Revisions included below.  Direct personal management at bed side and extensive interpretation of the data.  Plan was outlined and discussed in details with the housestaff.  Decision making of high complexity  Action taken for acute disease activity to reflect the level of care provided:  - medication reconciliation  - review laboratory data  resp failure secondary AECOPD secondary to RSV    oxygenation is stable  no seizure on EEG  trial of extubation and to NIV  start inhaled steroids
Pt remains desynchronous with the vent and not moving air well. Required increase in sedation for vent synchrony and air hunger/trapping. Pulm consult requested and will keep current steroid dose as is.
Smoker, suspected COPD, with exacerbation 2/2 RSV. Acute hypoxemic respiratory failure requiring mechanical ventilation. Daily pressure support trials. Continue with 5 days of antibiotics for possible bacterial pneumonia. Does well on pressure support but poor mental status; will get head imaging prior to extubation.
Patient seen and examined with house-staff during bedside rounds.  Resident note read, including vitals, physical findings, laboratory data, and radiological reports.   Revisions included below.  Direct personal management at bed side and extensive interpretation of the data.  Plan was outlined and discussed in details with the housestaff.  Decision making of high complexity  Action taken for acute disease activity to reflect the level of care provided:  - medication reconciliation  - review laboratory data  She had a weaning trial. Patient was successfully extubated. Patient tolerated noninvasive ventilation was weaned to high flow nasal cannula. Patient is stable and improved over the course of the day. He might need diuresis. I increased systemic steroids for underlying COPD. Patient minute and then anxiety to improve his dyspnea score. Patient is out of bed in chair.
Smoker, suspected COPD, with exacerbation 2/2 RSV. Acute hypoxemic respiratory failure requiring mechanical ventilation. Daily pressure support trials. Continue with 5 days of antibiotics for possible bacterial pneumonia.
Patient seen and examined with house-staff during bedside rounds.  Resident note read, including vitals, physical findings, laboratory data, and radiological reports.   Revisions included below.  Direct personal management at bed side and extensive interpretation of the data.  Plan was outlined and discussed in details with the housestaff.  Decision making of high complexity  Action taken for acute disease activity to reflect the level of care provided:  - medication reconciliation  - review laboratory data
74 year old male with undiagnosed COPD (CXR showed emphysema) with acute respiratory failure (Improved but still requiring 2l/min oxygen by nasal cannula) secondary to acute exacerbation of COPD due to acute viral bronchitis. Patient is better clinically and feels at baseline.  Plan:  - Exacerbation resolved. Finished PO azithromycin and Steroid  - Duoneb PRN for now  - Discharge home on Anoro daily  - Outpatient PFT  - Follow up with me in clinic as outpatient (downtown office) in 1 week.  - Rest as above
74 year old male with undiagnosed COPD (CXR reviewed , which showed emphysema) with acute respiratory failure (currently on high flow nasal cannula) secondary to acute exacerbation of COPD due to acute viral bronchitis. Patient also has metabolic alkalosis most likely secondary to steroid (no diuresis has been done). Labs were reviewed. ICU admission records were reviewed.  Plan:  - Patient continues to have acute hypoxic respiratory failure. High flow nasal cannula was restarted  - Change IV Solumedrol to PO prednisone 40 mg daily. Pan to taper over next 1 week  - Avoid fluid overload (Lung ultrasound showed A lines b/l without pleural effusion with lung sliding)  - Duoneb to continue  - No signs of Pulmonary HTN on bedside ECHO
Comfortable on NCO2 4 liters with O2 sat 97%.
The patient was seen and examined at the bedside. The case was discussed with the house staff in detail, the plan is as noted above by Dr. Hagen in addition to the following:      This is a 75 y/o man w/ no formal diagnosis of COPD but severe hyperinflation on x-ray and extensive smoking history presented with acute hypoxemic hypercapnic respiratory failure 2/2 RSV infection requiring intubation. S/P course of ABX w/ azithro, doing well clinically  now. Currently requiring oxygen which is new from baseline, will need to be evaluated for home O2. Started on budesonide, plan will be to continue as an outpatient and add a LAMA upon discharge. Pt awaiting home oxygen to be coordinated. Overall doing well, continuing to improve. Pt got placed on HFNC last night because of tachypnea, the pt has a cycle of anxiety that is usually worse at night which is likely the cause, he did not have a decrease in his SpO2.     1. Acute Hypoxemic Respiratory failure likely 2/2 underlying COPD (not formally diagnosed)  2. Likely COPD  3. Anxiety   4. Pre-Diabetes     Plan:  -Pt now requiring oxygen, he will need to be set up with home oxygen. Still pending  -Avoid HFNC at night, pt has hx of anxiety worse at night, pt recounts that he was tachypneic last night because he was very anxious about going home.    -Seen by PT, recommending home PT and outpatient pulmonary rehab, will discuss w/ social work   -Continue steroid taper  -Pt will need to continue inhaler upon discharge, can switch to symbicort and add LAMA based on what insurance will cover.   -Pt will follow with pulmonary upon discharge formal PFTs.   -Pt will need to follow w/ PMD regarding pre-diabetes.   -Pending dispo.
The pt was seen and examined at the bedside. The case was discussed in detail with the house staff. The plan is as noted above by Dr. Hagen with the following exceptions / additions :\    73 y/o man w/ no formal diagnosis of COPD but severe hyperinflation on x-ray and extensive smoking history presented with acute hypoxemic hypercapnic respiratory failure 2/2 RSV infection requiring intubation. S/P course of ABX w/ azithro, doing well clinically  now. Currently requiring oxygen which is new from baseline, will need to be evaluated for home O2. Started on budesonide, plan will be to continue as an outpatient and add a LAMA upon discharge. Pt will need to work with PT.

## 2020-01-21 NOTE — DISCHARGE NOTE PROVIDER - CARE PROVIDER_API CALL
Doris Valenzuela)  Critical Care Medicine; Pulmonary Disease  [address] [address]  Phone: [phone]  Fax: [phone]  Follow Up Time:     John Alexander)  Critical Care Medicine; Internal Medicine; Pulmonary Disease  [address] [place] [address]  Phone: [phone]  Fax: [phone]  Follow Up Time:

## 2020-01-21 NOTE — DISCHARGE NOTE PROVIDER - CARE PROVIDERS DIRECT ADDRESSES
[email or web address]

## 2020-01-21 NOTE — PROGRESS NOTE ADULT - SUBJECTIVE AND OBJECTIVE BOX
INTERVAL HISTORY:  Patient seen and examined at bedside. Feels well without any new or worsening respiratory symptoms but still with some dyspnea on exertion (walking up and down hallway)    VITAL SIGNS:  ICU Vital Signs Last 24 Hrs  T(C): 36.7 (21 Jan 2020 08:50), Max: 36.7 (21 Jan 2020 08:50)  T(F): 98 (21 Jan 2020 08:50), Max: 98 (21 Jan 2020 08:50)  HR: 103 (21 Jan 2020 08:50) (82 - 103)  BP: 107/72 (21 Jan 2020 08:50) (107/72 - 131/70)  BP(mean): --  ABP: --  ABP(mean): --  RR: 20 (21 Jan 2020 08:50) (18 - 20)  SpO2: 94% (21 Jan 2020 12:40) (94% - 99%)        CAPILLARY BLOOD GLUCOSE      POCT Blood Glucose.: 83 mg/dL (21 Jan 2020 12:01)      PHYSICAL EXAM:  Constitutional: resting comfortably in bed, NAD  HEENT: NC/AT; PERRL, anicteric sclera; no oropharyngeal erythema or exudates; MMM  Neck: supple, no JVD  Respiratory: slight wheezes bilaterally but good air movement, no W/R/R; respirations appear non-labored, speaking full sentences  Cardiovascular: +S1/S2, RRR  Gastrointestinal: abdomen soft, NT/ND; +BS x4  Extremities: WWP; no clubbing, cyanosis or edema  Vascular: 2+ radial, DP/PT and femoral pulses B/L      MEDICATIONS:  MEDICATIONS  (STANDING):  albuterol/ipratropium for Nebulization 3 milliLiter(s) Nebulizer every 4 hours  amLODIPine   Tablet 10 milliGRAM(s) Oral daily  buDESOnide    Inhalation Suspension 0.5 milliGRAM(s) Inhalation every 12 hours  dextrose 5%. 1000 milliLiter(s) (50 mL/Hr) IV Continuous <Continuous>  dextrose 50% Injectable 12.5 Gram(s) IV Push once  dextrose 50% Injectable 25 Gram(s) IV Push once  dextrose 50% Injectable 25 Gram(s) IV Push once  enoxaparin Injectable 30 milliGRAM(s) SubCutaneous every 24 hours  insulin lispro (HumaLOG) corrective regimen sliding scale   SubCutaneous every 6 hours  lisinopril 5 milliGRAM(s) Oral every 24 hours  pantoprazole    Tablet 40 milliGRAM(s) Oral before breakfast  petrolatum Ophthalmic Ointment 1 Application(s) Both EYES three times a day  tamsulosin 0.4 milliGRAM(s) Oral at bedtime    MEDICATIONS  (PRN):  dextrose 40% Gel 15 Gram(s) Oral once PRN Blood Glucose LESS THAN 70 milliGRAM(s)/deciliter  glucagon  Injectable 1 milliGRAM(s) IntraMuscular once PRN Glucose LESS THAN 70 milligrams/deciliter  guaiFENesin   Syrup  (Sugar-Free) 100 milliGRAM(s) Oral every 6 hours PRN Cough  LORazepam   Injectable 0.5 milliGRAM(s) IV Push every 4 hours PRN Anxiety      ALLERGIES:  Allergies    No Known Allergies    Intolerances        LABS:                        11.8   10.22 )-----------( 324      ( 21 Jan 2020 06:37 )             35.9     01-21    137  |  96  |  18  ----------------------------<  103<H>  4.2   |  28  |  0.93    Ca    8.5      21 Jan 2020 06:37  Phos  3.7     01-21  Mg     2.1     01-21            RADIOLOGY & ADDITIONAL TESTS:   Reviewed

## 2020-01-21 NOTE — DISCHARGE NOTE PROVIDER - NSDCFUADDAPPT_GEN_ALL_CORE_FT
Please follow up with either Dr. Valenzuela or Dr. Alexander to follow up on your recent diagnosis of COPD and your recent hospitalization.    While uninsured, you may go to [facility] or [facility] for primary care (to their medicine outpatient clinic). If you would like, after you have received health insurance you may follow up with [facility] for primary care.

## 2020-01-21 NOTE — PROGRESS NOTE ADULT - REASON FOR ADMISSION
Hypercapnic respiratory failure 2/2 to RSV

## 2020-01-21 NOTE — DISCHARGE NOTE PROVIDER - NSDCCPTREATMENT_GEN_ALL_CORE_FT
PRINCIPAL PROCEDURE  Procedure: XR chest AP  Findings and Treatment: FINDINGS: Heart size, mediastinal and hilar contours are within normal limits for the patient's stated age. There is hyperaeration. No consolidation or pleural collections. No pneumothorax. Soft tissues and osseous structures are intact.  IMPRESSION: Hyperaeration which may suggest underlying obstructive airway disease. No consolidation or pleural effusions.

## 2020-01-21 NOTE — DISCHARGE NOTE PROVIDER - HOSPITAL COURSE
Patient is __ yo M/F with past medical history of _____    Presented with _____, found to have _____    Problem List/Main Diagnoses (system-based):     Inpatient treatment course:     New medications:     Labs to be followed outpatient:     Exam to be followed outpatient:         73 yo M, no pertinent PMHx, no allergies, no home medications BIBEMS c/o SOB, wheezing, difficulty breathing in the setting of flu-like symptoms x 5 days. Pt c/o cough, body aches. Per wife pt is not on anti-coagulants, beta-blockers, anti-hypertensive drugs. Pt smokes marijuana regularly. Pt states he has not seen a PMD in years. Pt's wife admits that he used a vaporizer for marijuana once recently, but has been using a vicks vaporizer since onset of symptoms. Patient intubated and admitted to the MICU for Hypercapnic respiratory failure due to RSV pneumonia, s/p trial of BIPAP with improvement in hypoxia but not ventilation, requiring intubation for impending respiratory failure, labs noted with hyponatremia, mild, likely due to volume depletion/sepsis. Given IV abx and sedatives in ED.         HOSPITAL COURSE:    73 yo M, no pertinent PMHx, no allergies, no home medications BIBEMS c/o SOB, wheezing, difficulty breathing in the setting of flu-like symptoms, cough and body aches x 5 days. Patient found to have hypercapnic respiratory failure 2/2 RSV in context of possible hx of COPD and chronic tobacco smoking history. Has recent marijuana vaping history. The patient was intubated, and treated with azithromycin for antiinflammatory effects, solumedrol BID, and duonebs. Patient failed multiple SBT but was eventually extubated on 1/15. Patient was initially tachypneic and using significant accessory muscles while on BIPAP, but was slowly weaned to high flow nasal cannula, now on 4LNC. Patient received multiple doses of ativan PRN to help with anxiety regarding breathing, which helped patient with his respiratory status. Patient passed trial of void and is hemodynamically stable for step down to 7Lach.

## 2020-01-21 NOTE — DISCHARGE NOTE PROVIDER - NSFOLLOWUPCLINICS_GEN_ALL_ED_FT
[facility]  Family Medicine  [facility]  [place], [place] [facility]  Phone: [phone]  Fax:   Follow Up Time:

## 2020-01-23 DIAGNOSIS — Z87.891 PERSONAL HISTORY OF NICOTINE DEPENDENCE: ICD-10-CM

## 2020-01-23 DIAGNOSIS — J44.1 CHRONIC OBSTRUCTIVE PULMONARY DISEASE WITH (ACUTE) EXACERBATION: ICD-10-CM

## 2020-01-23 DIAGNOSIS — E86.1 HYPOVOLEMIA: ICD-10-CM

## 2020-01-23 DIAGNOSIS — J96.02 ACUTE RESPIRATORY FAILURE WITH HYPERCAPNIA: ICD-10-CM

## 2020-01-23 DIAGNOSIS — R39.11 HESITANCY OF MICTURITION: ICD-10-CM

## 2020-01-23 DIAGNOSIS — R41.82 ALTERED MENTAL STATUS, UNSPECIFIED: ICD-10-CM

## 2020-01-23 DIAGNOSIS — R73.03 PREDIABETES: ICD-10-CM

## 2020-01-23 DIAGNOSIS — E44.0 MODERATE PROTEIN-CALORIE MALNUTRITION: ICD-10-CM

## 2020-01-23 DIAGNOSIS — E87.2 ACIDOSIS: ICD-10-CM

## 2020-01-23 DIAGNOSIS — I10 ESSENTIAL (PRIMARY) HYPERTENSION: ICD-10-CM

## 2020-01-23 DIAGNOSIS — Z78.1 PHYSICAL RESTRAINT STATUS: ICD-10-CM

## 2020-01-23 DIAGNOSIS — E87.1 HYPO-OSMOLALITY AND HYPONATREMIA: ICD-10-CM

## 2020-01-23 DIAGNOSIS — F41.9 ANXIETY DISORDER, UNSPECIFIED: ICD-10-CM

## 2020-01-23 DIAGNOSIS — J44.0 CHRONIC OBSTRUCTIVE PULMONARY DISEASE WITH (ACUTE) LOWER RESPIRATORY INFECTION: ICD-10-CM

## 2020-01-23 DIAGNOSIS — D53.9 NUTRITIONAL ANEMIA, UNSPECIFIED: ICD-10-CM

## 2020-01-23 DIAGNOSIS — J12.1 RESPIRATORY SYNCYTIAL VIRUS PNEUMONIA: ICD-10-CM

## 2020-01-23 DIAGNOSIS — A41.9 SEPSIS, UNSPECIFIED ORGANISM: ICD-10-CM

## 2021-07-07 NOTE — PROGRESS NOTE ADULT - PROBLEM SELECTOR PLAN 1
Pt reports an improvement in symptoms  
Patient has continued to improve since his admission. He has completed his azithromycin and steroid course, and is currently on Duonebs with an ICS. Can switch this to a LAMA/ LABA (Stiolto/ Anoro), which is to continue on an outpatient basis. Would re-evaluate him in terms of oxygen need by ambulating him to see his sats. Otherwise, can continue KIMMIE PRN outpatient in addition to the LAMA/ LABA.      Recommend  - LABA/LAMA maintenance therapy to continue  - KIMMIE PRN  - Can use supplemental O2 to maintain sats >88% at rest and with ambulation  - f/u with Dr. John Alexander on outpatient basis.
p/w SOB, wheezing, RSV+ in setting of possible COPD and chronic smoker, found to have hypercapneic respiratory failure w/ clear CXR, requiring intubation (1/9). Extubated 1/15, and weaned to nasal cannula.  -s/p course of azithromycin for anti-inflammatory effects  -will receive last dose of prednisone taper today (5mg) per pulm recs  - c/w Duonebs q4hr standing  - Pulm following, appreciate reccs  - Ativan 0.5mg q4hr prn for positive cycle of tachypnea and anxiety
Patient presented SOB, wheezing, RSV+ in setting of possible COPD and chronic smoker. S/p solumedrol. No infiltrate seen on chest xray. Intubated 1/9, successfully extubated 1/15. Now weaned to 6LNC and prn HFNC.  - Switched solumedrol to prednisone 40mg daily, will start prednisone taper by 10mg daily starting tomorrow 1/18 per pulm reccs  - completed course of azithromycin for antiinflammatory affects  - c/w Duonebs q4hr standing  - Pulm following, appreciate reccs  - Ativan 0.5mg q4hr prn for positive cycle of tachypnea and anxiety
Although there is no objective data for diagnosis, patient likely has COPD based on radiographic appearance of his lungs and evidence from his breathing mechanics illustrated clinically and by the ventilator during his time in the ICU. His respiratory symptoms started to appear shortly after starting vaping approximately 1 month ago. Vaping induced lung injury may be in the differential, but less likely. His persistent hypoxemia also raises other possible dx such as PE. However, bedside ultrasound shows no evidence of RH strain or compromised RH systolic function. For now, would continue to treat as COPD exacerbation with current treatment. He has completed his azithromycin course and can be continued on systemic steroids. Due to needing a slightly longer course, can utilize a taper of 10mg decrements per day.     Recommend  - Prednisone to decrease by 10mg daily till 10mg a day. Transition to 5mg daily after that, and 0 the day after. Once steroid course complete, can transition to LAMA tx  - Duonebs to continue at current dose  - Can use supplemental O2 to maintain sats >88%. Currently on HFNC at minimal settings and can transition to nasal canula as tolerated.
Patient presented SOB, wheezing, RSV+ in setting of possible COPD and chronic smoker. S/p solumedrol 125mg. No infiltrate seen on chest xray. Intubated 1/9, successfully extubated 1/15. Now weaned to 4LNC.  - Switched solumedrol to prednisone 40mg daily  - completed Azithromycin 250 for antiinflammatory affects  - c/w Duonebs q4hr standing  - Pulm consulted for evaluation for obstructive lung disease  - Ativan 0.5mg prn for positive cycle of tachypnea and anxiety
Patient presented SOB, wheezing, RSV+ in setting of possible COPD and chronic smoker. S/p solumedrol. No infiltrate seen on chest xray. Intubated 1/9, successfully extubated 1/15. Now weaned to 6LNC and prn HFNC.  - Switched solumedrol to prednisone 40mg daily, will start prednisone taper by 10mg daily starting tomorrow 1/18 per pulm reccs  - completed course of azithromycin for antiinflammatory affects  - c/w Duonebs q4hr standing  - Pulm following, appreciate reccs  - Ativan 0.5mg q4hr prn for positive cycle of tachypnea and anxiety
p/w SOB, wheezing, RSV+ in setting of possible COPD and chronic smoker, found to have hypercapneic respiratory failure w/ clear CXR, requiring intubation (1/9). Extubated 1/15, and weaned to nasal cannula.  -s/p course of azithromycin for anti-inflammatory effects  -pt now on prednisone 40mg QD (switched from solumedrol), will start prednisone taper by 10mg daily starting tomorrow 1/18 per pulm reccs  - c/w Duonebs q4hr standing  - Pulm following, appreciate reccs  - Ativan 0.5mg q4hr prn for positive cycle of tachypnea and anxiety

## 2021-08-03 NOTE — H&P ADULT - SEXUAL ORIENTATION
8/3/2021  INisa MD, saw and evaluated the patient  I have discussed the patient with the resident/non-physician practitioner and agree with the resident's/non-physician practitioner's findings, Plan of Care, and MDM as documented in the resident's/non-physician practitioner's note, except where noted  All available labs and Radiology studies were reviewed  I was present for key portions of any procedure(s) performed by the resident/non-physician practitioner and I was immediately available to provide assistance  At this point I agree with the current assessment done in the Emergency Department      ED Course         Critical Care Time  Procedures Straight, Heterosexual

## 2023-05-11 NOTE — PROGRESS NOTE ADULT - MINUTES
30 Ear Wedge Repair Text: A wedge excision was completed by carrying down an excision through the full thickness of the ear and cartilage with an inward facing Burow's triangle. The wound was then closed in a layered fashion.

## 2023-06-28 NOTE — PATIENT PROFILE ADULT - BRADEN ACTIVITY
"Return Medical Weight Management Note     Michael Amin  MRN:  [id number]  :  1980  SULTA[place]:  2023    Dear STEPHAN BEAULIEU,    I had the pleasure of seeing your patient Michael Amin. He is a 42 year old male who I am continuing to see for treatment of obesity related to:      Assessment & Plan   Problem List Items Addressed This Visit        Endocrine Diagnoses    Diabetes mellitus type 1 (H) - Primary    Relevant Medications    Semaglutide, 1 MG/DOSE, (OZEMPIC) 4 MG/3ML pen    Class 1 obesity with serious comorbidity and body mass index (BMI) of 33.0 to 33.9 in adult, unspecified obesity type    Relevant Medications    Semaglutide, 1 MG/DOSE, (OZEMPIC) 4 MG/3ML pen       Other    Kidney replaced by transplant      42 y.o. male with hx of kidney transplant 2019. Type 1 DM and needs BMI <30-32 for pancreas transplant.    Doing well on ozempic 0.5mg weekly.    Increase ozempic to 1mg weekly. Monitor insulin and BS closely    Follow up 3 months Tasha return MWM    INTERVAL HISTORY:  42 y.o. male with hx of kidney transplant 2019. Type 1 DM and needs BMI <30-32 for pancreas transplant.    Doing well on ozempic 0.5mg weekly. \"for first time in my life I feel hunger controlled\"  Has lost from 234 to 210 lbs     BS improved and more stable, fewer ups and downs.120-130s fasting  Insulin pump and less insulin need since starting ozempic. Daily insulin down from 100-120 units per day to 70's.      Anti-obesity medications:     Current:   ozempic 0.5mg weekly    CURRENT WEIGHT:   210 lbs 3.2 oz    Initial Weight (lbs): 234 lbs  Last Visits Weight: 106.1 kg (234 lb)  Cumulative weight loss (lbs): 23.8  Weight Loss Percentage: 10.17%        2023     9:13 AM   Changes and Difficulties   I have made the following changes to my diet since my last visit: Eating less, not as hungry with ozempic   With regards to my diet, I am still struggling with: No   I have made the following changes to my activity/exercise " "since my last visit: Walking more   With regards to my activity/exercise, I am still struggling with: Work in progress         MEDICATIONS:   Current Outpatient Medications   Medication Sig Dispense Refill     alcohol swab prep pads Use to swab area of injection/zak as directed. 100 each 3     aspirin (ASA) 81 MG EC tablet Take 81 mg by mouth daily        atorvastatin (LIPITOR) 10 MG tablet TAKE 1 TABLET(10 MG) BY MOUTH DAILY 90 tablet 3     blood glucose (CONTOUR NEXT TEST) test strip Use to test blood sugar 4 times daily. 400 strip 11     carvedilol (COREG) 12.5 MG tablet Take 1 tablet (12.5 mg) by mouth 2 times daily (with meals) 180 tablet 3     Continuous Blood Gluc Sensor (DEXCOM G6 SENSOR) MISC Change every 10 days. Please dispense 90 day supply- dispense 9 sensors. 9 each 4     Continuous Blood Gluc Transmit (DEXCOM G6 TRANSMITTER) MISC Change every 3 months 1 each 3     Glucagon (BAQSIMI ONE PACK) 3 MG/DOSE POWD Spray 3 mg in nostril See Admin Instructions USE ONLY FOR SEVERE HYPOGLYCEMIA. 1 each 3     insulin cartridge (T:SLIM 3ML) misc pump supply Insulin cartridge to be used with pump as directed.  Change every 2 days or as directed. 45 each 3     Insulin Infusion Pump Supplies (AUTOSOFT XC INFUSION SET) MISC 1 each every 48 hours Change every 2 days  9 mm cannula, 23 inch tubing 45 each 3     insulin lispro (HUMALOG VIAL) 100 UNIT/ML vial Via insulin pump. Approx 130 units daily. Follow up visit needed. Call  to schedule. 130 mL 1     insulin pen needle (ULTICARE MICRO) 32G X 4 MM miscellaneous Use pen needles daily or as directed. 100 each 3     Insulin Syringe-Needle U-100 31G X 1/4\" 1 ML MISC 1 Syringe as needed (until new insuline pump arrives) 50 each 1     magnesium oxide (MAG-OX) 400 MG tablet Take 1 tablet (400 mg) by mouth daily (with lunch) 30 tablet 5     mycophenolate (GENERIC EQUIVALENT) 250 MG capsule Take 3 capsules (750 mg) by mouth 2 times daily 180 capsule 11     " Semaglutide, 1 MG/DOSE, (OZEMPIC) 4 MG/3ML pen Inject 1 mg Subcutaneous every 7 days 3 mL 3     senna-docusate (SENOKOT-S/PERICOLACE) 8.6-50 MG tablet Take 2 tablets by mouth 2 times daily 20 tablet 0     tacrolimus (GENERIC EQUIVALENT) 0.5 MG capsule Take 1 capsule (0.5 mg) by mouth every morning Total dose = 1.5 mg in the AM and 1 mg in the PM 90 capsule 3     tacrolimus (GENERIC EQUIVALENT) 1 MG capsule Total dose = 1.5 mg in the AM and 1 mg in the  capsule 3     blood glucose monitoring (ACCU-CHEK FASTCLIX) lancets U QID OR MORE OFTEN PRN (Patient not taking: Reported on 6/28/2023)  1     insulin lispro (HUMALOG VIAL) 100 UNIT/ML vial With Insulin pump. Up to 130 units per day. (Patient not taking: Reported on 6/28/2023) 30 mL 11     sulfamethoxazole-trimethoprim (BACTRIM) 400-80 MG tablet Take 1 tablet by mouth daily (Patient not taking: Reported on 3/27/2023) 30 tablet 11           6/28/2023     9:13 AM   Weight Loss Medication History Reviewed With Patient   Which weight loss medications are you currently taking on a regular basis? Ozempic   Are you having any side effects from the weight loss medication that we have prescribed you? No       External Order Results on 01/12/2023   Component Date Value Ref Range Status     WBC Count (External) 01/12/2023 8.0  3.2 - 11.0 10*9/L Final     RBC Count (External) 01/12/2023 4.85  4.14 - 5.76 10*12/L Final     Hemoglobin (External) 01/12/2023 12.5 (L)  12.9 - 16.9 g/dL Final     Hematocrit (External) 01/12/2023 38.4  38.4 - 49.7 % Final     MCV (External) 01/12/2023 79.2 (L)  81.4 - 99.0 fL Final     MCH (External) 01/12/2023 25.8 (L)  26.7 - 33.1 pg Final     MCHC (External) 01/12/2023 32.6  31.6 - 35.5 g/dL Final     RDW (External) 01/12/2023 13.2  11.3 - 14.6 % Final     Platelet Count (External) 01/12/2023 170  130 - 375 10*9/L Final     Sodium (External) 01/12/2023 137  134 - 143 mEq/L Final     Potassium (External) 01/12/2023 4.4  3.4 - 5.1 mEq/L Final      "Chloride (External) 01/12/2023 102  99 - 110 mEq/L Final     CO2 (External) 01/12/2023 24  19 - 29 mEq/L Final     Anion Gap (External) 01/12/2023 11.0  3.0 - 15.0 mEq/L Final     Urea Nitrogen (External) 01/12/2023 17  5 - 24 mg/dL Final     Creatinine (External) 01/12/2023 1.01  0.70 - 1.20 mg/dL Final     GFR Estimated (External) 01/12/2023 95  >60 ml/min/1.73m2 Final     Calcium (External) 01/12/2023 9.2  8.4 - 10.5 mg/dL Final     Glucose (External) 01/12/2023 156 (H)  70 - 99 mg/dL Final     Tacrolimus(FK-506) (External) 01/12/2023 5.7  5.0 - 15.0 ng/mL Final     Hemoglobin A1C (External) 01/12/2023 8.2 (H)  4.0 - 5.6 % Final       PHYSICAL EXAM:  Objective    Ht 1.676 m (5' 6\")   Wt 95.3 kg (210 lb 3.2 oz)   BMI 33.93 kg/m      Vitals - Patient Reported  Pain Score: No Pain (0)        GENERAL: Healthy, alert and no distress  EYES: Eyes grossly normal to inspection.  No discharge or erythema, or obvious scleral/conjunctival abnormalities.  RESP: No audible wheeze, cough, or visible cyanosis.  No visible retractions or increased work of breathing.    SKIN: Visible skin clear. No significant rash, abnormal pigmentation or lesions.  NEURO: Cranial nerves grossly intact.  Mentation and speech appropriate for age.  PSYCH: Mentation appears normal, affect normal/bright, judgement and insight intact, normal speech and appearance well-groomed.        Sincerely,    Tasha Zepeda PA-C      20 minutes spent by me on the date of the encounter doing chart review, history and exam, documentation and further activities per the noteVirtual Visit Details    Type of service:  Video Visit     Originating Location (pt. Location): Home  Distant Location (provider location):  Off-site  Platform used for Video Visit: Caesar" (1) bedfast

## 2023-12-12 NOTE — CHART NOTE - NSCHARTNOTEFT_GEN_A_CORE
Admitting Diagnosis:   Patient is a 74y old  Male who presents with a chief complaint of Hypercapnic respiratory failure 2/2 to RSV (15 Nawaf 2020 06:40)      PAST MEDICAL & SURGICAL HISTORY:  No pertinent past medical history  No significant past surgical history      Current Nutrition Order:  Ordered for TF though held for extubation        PO Intake: Good (%) [   ]  Fair (50-75%) [   ] Poor (<25%) [   ]- N/A NPO    GI Issues: Had complaints of nausea immediately after being extubated but reported feeling better after suctioning; was experiencing TF residuals of 60-130cc over past 24hrs  Last BM 1/15     Pain: He denied pain at time of visit     Skin Integrity: Marvin 14, intact pressure-wise     Labs:   01-15    141  |  98  |  18  ----------------------------<  132<H>  4.2   |  33<H>  |  0.83    Ca    8.0<L>      15 Nawaf 2020 06:28  Phos  3.2     01-15  Mg     2.3     01-15    TPro  5.6<L>  /  Alb  3.0<L>  /  TBili  0.2  /  DBili  x   /  AST  23  /  ALT  22  /  AlkPhos  40  01-15    CAPILLARY BLOOD GLUCOSE      POCT Blood Glucose.: 136 mg/dL (15 Nawaf 2020 11:07)  POCT Blood Glucose.: 125 mg/dL (15 Nawaf 2020 05:57)  POCT Blood Glucose.: 131 mg/dL (14 Jan 2020 23:13)  POCT Blood Glucose.: 132 mg/dL (14 Jan 2020 17:07)      Medications:  MEDICATIONS  (STANDING):  albuterol/ipratropium for Nebulization 3 milliLiter(s) Nebulizer every 4 hours  azithromycin   Tablet 250 milliGRAM(s) Oral daily  buDESOnide    Inhalation Suspension 0.5 milliGRAM(s) Inhalation every 12 hours  chlorhexidine 2% Cloths 1 Application(s) Topical daily  dexMEDEtomidine Infusion 0.2 MICROgram(s)/kG/Hr (2.5 mL/Hr) IV Continuous <Continuous>  dextrose 5%. 1000 milliLiter(s) (50 mL/Hr) IV Continuous <Continuous>  dextrose 50% Injectable 12.5 Gram(s) IV Push once  dextrose 50% Injectable 25 Gram(s) IV Push once  dextrose 50% Injectable 25 Gram(s) IV Push once  enoxaparin Injectable 30 milliGRAM(s) SubCutaneous every 24 hours  insulin lispro (HumaLOG) corrective regimen sliding scale   SubCutaneous every 6 hours  methylPREDNISolone sodium succinate Injectable 40 milliGRAM(s) IV Push every 12 hours  pantoprazole   Suspension 40 milliGRAM(s) Enteral Tube daily  petrolatum Ophthalmic Ointment 1 Application(s) Both EYES three times a day  polyethylene glycol 3350 17 Gram(s) Oral daily    MEDICATIONS  (PRN):  dextrose 40% Gel 15 Gram(s) Oral once PRN Blood Glucose LESS THAN 70 milliGRAM(s)/deciliter  glucagon  Injectable 1 milliGRAM(s) IntraMuscular once PRN Glucose LESS THAN 70 milligrams/deciliter  ondansetron Injectable 4 milliGRAM(s) IV Push every 6 hours PRN Nausea      Weight: 50kg  Daily     Daily     Weight Change: No new weights recorded since admit     Nutrition Focused Physical Exam: Completed [ X-1/10, please see chart note  ]  Not Pertinent [   ]    Estimated energy needs: ActualBW used for calculations as pt between % of IBW. Needs estimated for age and increased for malnutrition, pna  Calories: 25-30 kcal/kg = 9430-0148 kcal/day  Protein: 1.2-1.4 g/kg = 60-70g protein/day  Fluids per team 2/2 PNA    Subjective: 75 yo/male with no significant PMHx presented with SOB and wheezing. Patient intubated and admitted to the MICU for hypercapnic respiratory failure due to RSV pneumonia. TTE hyperdynamic. Pt failed CPAP trial on 1/14 d/t tachypnea though successfully extubated to BiPAP this morning during rounds. Initially tachycardic and agitated after extubation, though calmed down by residents. Remains on precedex gtt. NPO for extubation; NGT removed as it appeared to be causing nausea/gagging. Pt awake, alert. He denied complaints of nausea at time of visit, no vomiting. Last BM this morning. No pain endorsed. Discussed diet advancement once stable off of BiPAP; pt understanding. Lytes WNL, POC , 131mg/dL. Will continue to follow per RD protocol.     Previous Nutrition Diagnosis:  Malnutrition RT suspected inadequate energy intake in the setting of increased nutrient needs AEB muscle loss, inadequate intake, weight loss.     Active [  X ]  Resolved [   ]    If resolved, new PES:     Goal: Pt will consistently meet % of EER; pt will show no further s/s malnutrition throughout admission     Recommendations:  1. Once stable off of BiPAP, recommend dysphagia screen. If passes, recommend Regular diet with Ensure Enlive BID (700 kcal, 40g protein, 360 mL free H2O)   2. Recommend MVI   3. Monitor lytes and replete prn.   4. Pain and bowel regimens per team     Education: Discussed diet advancement once off of BiPAP     Risk Level: High [  X ] Moderate [   ] Low [   ]
Admitting Diagnosis:   Patient is a 74y old  Male who presents with a chief complaint of Hypercapnic respiratory failure 2/2 to RSV (20 Jan 2020 11:58)      PAST MEDICAL & SURGICAL HISTORY:  No pertinent past medical history  No significant past surgical history      Current Nutrition Order: Dysphagia 2 MS, thin liquids, Ensure Enlive BID    PO Intake: Good (%) [ x  ]  Fair (50-75%) [   ] Poor (<25%) [   ]    GI Issues: Denies N/V/C/D with last BM today (formed)    Pain: Not reporting pain at this time     Skin Integrity: No edema, no pressure injury    Labs:   01-20    136  |  92<L>  |  17  ----------------------------<  113<H>  4.1   |  29  |  0.85    Ca    8.8      20 Jan 2020 06:50  Phos  2.6     01-19  Mg     1.9     01-20      CAPILLARY BLOOD GLUCOSE      POCT Blood Glucose.: 172 mg/dL (20 Jan 2020 12:04)  POCT Blood Glucose.: 96 mg/dL (20 Jan 2020 06:04)  POCT Blood Glucose.: 97 mg/dL (19 Jan 2020 23:52)  POCT Blood Glucose.: 104 mg/dL (19 Jan 2020 22:28)  POCT Blood Glucose.: 97 mg/dL (19 Jan 2020 18:13)      Medications:  MEDICATIONS  (STANDING):  albuterol/ipratropium for Nebulization 3 milliLiter(s) Nebulizer every 4 hours  amLODIPine   Tablet 10 milliGRAM(s) Oral daily  buDESOnide    Inhalation Suspension 0.5 milliGRAM(s) Inhalation every 12 hours  dextrose 5%. 1000 milliLiter(s) (50 mL/Hr) IV Continuous <Continuous>  dextrose 50% Injectable 12.5 Gram(s) IV Push once  dextrose 50% Injectable 25 Gram(s) IV Push once  dextrose 50% Injectable 25 Gram(s) IV Push once  enoxaparin Injectable 30 milliGRAM(s) SubCutaneous every 24 hours  insulin lispro (HumaLOG) corrective regimen sliding scale   SubCutaneous every 6 hours  lisinopril 5 milliGRAM(s) Oral every 24 hours  pantoprazole    Tablet 40 milliGRAM(s) Oral before breakfast  petrolatum Ophthalmic Ointment 1 Application(s) Both EYES three times a day  tamsulosin 0.4 milliGRAM(s) Oral at bedtime    MEDICATIONS  (PRN):  dextrose 40% Gel 15 Gram(s) Oral once PRN Blood Glucose LESS THAN 70 milliGRAM(s)/deciliter  glucagon  Injectable 1 milliGRAM(s) IntraMuscular once PRN Glucose LESS THAN 70 milligrams/deciliter  guaiFENesin   Syrup  (Sugar-Free) 100 milliGRAM(s) Oral every 6 hours PRN Cough  LORazepam   Injectable 0.5 milliGRAM(s) IV Push every 4 hours PRN Anxiety      Weight:  Admit wt- 50kg    Weight Change: No new wts to assess    Nutrition Focused Physical Exam: Completed [ X-1/10, please see chart note  ]  Not Pertinent [   ]    Estimated energy needs:   ActualBW used for calculations as pt between % of IBW. Needs estimated for age and increased for malnutrition, pna  Calories: 25-30 kcal/kg = 9112-5825 kcal/day  Protein: 1.2-1.4 g/kg = 60-70g protein/day  Fluids per team 2/2 PNA    Subjective:   Pt seen for nutrition follow up. 73 yo M, no pertinent PMHx, no allergies, no home medications BIBEMS for shortness of beath, found to be in hypercapnic respiratory failure 2/2 RSV in context of possible COPD and chronic smoking history. Required intubation (1/9). Extubated 1/15, and weaned to nasal cannula. Pt seen resting in bed, awake, alert. Pt is on dysphagia 2 MS, thin liquid diet w/ good PO intake, reports consuming most of food on meal tray. Also ordered for Ensure Enlive BID, which pt has been consuming and enjoying. Pt reports poor dentition however tolerating MS diet well. POCT: 1/20: 96, 1/19: , 1/18:  and HgbA1c 6.1% (1/19), monitor BG, pt could benefit from addition of cstCHO diet. Diet education provided. Nutrition recs below. RD to follow up per protocol.     Previous Nutrition Diagnosis:  Malnutrition RT suspected inadequate energy intake in the setting of increased nutrient needs AEB muscle loss, inadequate intake, weight loss.     Active [  X ]  Resolved [   ]    If resolved, new PES:     Goal: Pt will consistently meet % of EER; pt will show no further s/s malnutrition throughout admission     Recommendations:  1. Recommend c/w MS diet  2. Recommend c/w Ensure Enlive BID (700kcal and 40gms protein)  3. Monitor lytes and replete prn.   4. Pain and bowel regimens per team     Education: Discussed current diet order, encouraged PO intake     Risk Level: High [   ] Moderate [ x  ] Low [   ].
Upon Nutritional Assessment by the Registered Dietitian your patient was determined to meet criteria / has evidence of the following diagnosis/diagnoses:          [ ]  Mild Protein Calorie Malnutrition        [X ]  Moderate Protein Calorie Malnutrition        [ ] Severe Protein Calorie Malnutrition        [ ] Unspecified Protein Calorie Malnutrition        [X ] Underweight / BMI <19        [ ] Morbid Obesity / BMI > 40      Findings as based on:  •  Comprehensive nutrition assessment and consultation    Per physical assessment: Muscle Wasting- Temporal [  X ]  Clavicle/Pectoral [ X  ]  Shoulder/Deltoid [   ]  Scapula [   ]  Interosseous [   ]  Quadriceps [ X  ]  Gastrocnemius [   ]; Fat Wasting- Orbital [   ]  Buccal [   ]  Triceps [   ]  Rib [   ]--> Suspect moderate protein-calorie malnutrition 2/2 to physical assessment, inadequate energy intake of <75% for > 1 month, and weight loss of ~8% x 4-5 months     Treatment:    The following diet has been recommended:    Please see initial RD assessment for nutrition recommendations      PROVIDER Section:     By signing this assessment you are acknowledging and agree with the diagnosis/diagnoses assigned by the Registered Dietitian    Comments:
yes...

## 2023-12-18 ENCOUNTER — EMERGENCY (EMERGENCY)
Facility: HOSPITAL | Age: 78
LOS: 1 days | Discharge: ANOTHER HOSPITAL | End: 2023-12-18
Attending: EMERGENCY MEDICINE | Admitting: EMERGENCY MEDICINE
Payer: SELF-PAY

## 2023-12-18 VITALS — HEART RATE: 73 BPM | SYSTOLIC BLOOD PRESSURE: 108 MMHG | DIASTOLIC BLOOD PRESSURE: 60 MMHG

## 2023-12-18 VITALS
SYSTOLIC BLOOD PRESSURE: 122 MMHG | TEMPERATURE: 92 F | RESPIRATION RATE: 20 BRPM | DIASTOLIC BLOOD PRESSURE: 63 MMHG | HEART RATE: 74 BPM | OXYGEN SATURATION: 100 %

## 2023-12-18 DIAGNOSIS — J44.9 CHRONIC OBSTRUCTIVE PULMONARY DISEASE, UNSPECIFIED: ICD-10-CM

## 2023-12-18 DIAGNOSIS — Z20.822 CONTACT WITH AND (SUSPECTED) EXPOSURE TO COVID-19: ICD-10-CM

## 2023-12-18 DIAGNOSIS — I46.9 CARDIAC ARREST, CAUSE UNSPECIFIED: ICD-10-CM

## 2023-12-18 DIAGNOSIS — R06.02 SHORTNESS OF BREATH: ICD-10-CM

## 2023-12-18 LAB
ALBUMIN SERPL ELPH-MCNC: 2.9 G/DL — LOW (ref 3.4–5)
ALBUMIN SERPL ELPH-MCNC: 2.9 G/DL — LOW (ref 3.4–5)
ALP SERPL-CCNC: 97 U/L — SIGNIFICANT CHANGE UP (ref 40–120)
ALP SERPL-CCNC: 97 U/L — SIGNIFICANT CHANGE UP (ref 40–120)
ALT FLD-CCNC: >1000 U/L — HIGH (ref 12–42)
ALT FLD-CCNC: >1000 U/L — HIGH (ref 12–42)
ANION GAP SERPL CALC-SCNC: 22 MMOL/L — HIGH (ref 9–16)
ANION GAP SERPL CALC-SCNC: 22 MMOL/L — HIGH (ref 9–16)
APPEARANCE UR: CLEAR — SIGNIFICANT CHANGE UP
APPEARANCE UR: CLEAR — SIGNIFICANT CHANGE UP
AST SERPL-CCNC: >1000 U/L — HIGH (ref 15–37)
AST SERPL-CCNC: >1000 U/L — HIGH (ref 15–37)
BACTERIA # UR AUTO: ABNORMAL /HPF
BACTERIA # UR AUTO: ABNORMAL /HPF
BASOPHILS # BLD AUTO: 0 K/UL — SIGNIFICANT CHANGE UP (ref 0–0.2)
BASOPHILS # BLD AUTO: 0 K/UL — SIGNIFICANT CHANGE UP (ref 0–0.2)
BASOPHILS NFR BLD AUTO: 0 % — SIGNIFICANT CHANGE UP (ref 0–2)
BASOPHILS NFR BLD AUTO: 0 % — SIGNIFICANT CHANGE UP (ref 0–2)
BILIRUB SERPL-MCNC: 0.5 MG/DL — SIGNIFICANT CHANGE UP (ref 0.2–1.2)
BILIRUB SERPL-MCNC: 0.5 MG/DL — SIGNIFICANT CHANGE UP (ref 0.2–1.2)
BILIRUB UR-MCNC: NEGATIVE — SIGNIFICANT CHANGE UP
BILIRUB UR-MCNC: NEGATIVE — SIGNIFICANT CHANGE UP
BUN SERPL-MCNC: 20 MG/DL — SIGNIFICANT CHANGE UP (ref 7–23)
BUN SERPL-MCNC: 20 MG/DL — SIGNIFICANT CHANGE UP (ref 7–23)
CALCIUM SERPL-MCNC: 9.2 MG/DL — SIGNIFICANT CHANGE UP (ref 8.5–10.5)
CALCIUM SERPL-MCNC: 9.2 MG/DL — SIGNIFICANT CHANGE UP (ref 8.5–10.5)
CHLORIDE SERPL-SCNC: 87 MMOL/L — LOW (ref 96–108)
CHLORIDE SERPL-SCNC: 87 MMOL/L — LOW (ref 96–108)
CO2 SERPL-SCNC: 20 MMOL/L — LOW (ref 22–31)
CO2 SERPL-SCNC: 20 MMOL/L — LOW (ref 22–31)
COLOR SPEC: YELLOW — SIGNIFICANT CHANGE UP
COLOR SPEC: YELLOW — SIGNIFICANT CHANGE UP
CREAT SERPL-MCNC: 1.51 MG/DL — HIGH (ref 0.5–1.3)
CREAT SERPL-MCNC: 1.51 MG/DL — HIGH (ref 0.5–1.3)
DIFF PNL FLD: ABNORMAL
DIFF PNL FLD: ABNORMAL
EGFR: 47 ML/MIN/1.73M2 — LOW
EGFR: 47 ML/MIN/1.73M2 — LOW
EOSINOPHIL # BLD AUTO: 0 K/UL — SIGNIFICANT CHANGE UP (ref 0–0.5)
EOSINOPHIL # BLD AUTO: 0 K/UL — SIGNIFICANT CHANGE UP (ref 0–0.5)
EOSINOPHIL NFR BLD AUTO: 0 % — SIGNIFICANT CHANGE UP (ref 0–6)
EOSINOPHIL NFR BLD AUTO: 0 % — SIGNIFICANT CHANGE UP (ref 0–6)
EPI CELLS # UR: PRESENT
EPI CELLS # UR: PRESENT
FLUAV AG NPH QL: SIGNIFICANT CHANGE UP
FLUAV AG NPH QL: SIGNIFICANT CHANGE UP
FLUBV AG NPH QL: SIGNIFICANT CHANGE UP
FLUBV AG NPH QL: SIGNIFICANT CHANGE UP
GLUCOSE SERPL-MCNC: 198 MG/DL — HIGH (ref 70–99)
GLUCOSE SERPL-MCNC: 198 MG/DL — HIGH (ref 70–99)
GLUCOSE UR QL: NEGATIVE MG/DL — SIGNIFICANT CHANGE UP
GLUCOSE UR QL: NEGATIVE MG/DL — SIGNIFICANT CHANGE UP
GRAN CASTS # UR COMP ASSIST: PRESENT
GRAN CASTS # UR COMP ASSIST: PRESENT
HCT VFR BLD CALC: 47.5 % — SIGNIFICANT CHANGE UP (ref 39–50)
HCT VFR BLD CALC: 47.5 % — SIGNIFICANT CHANGE UP (ref 39–50)
HGB BLD-MCNC: 13.5 G/DL — SIGNIFICANT CHANGE UP (ref 13–17)
HGB BLD-MCNC: 13.5 G/DL — SIGNIFICANT CHANGE UP (ref 13–17)
HYPOSEGMENTATION: PRESENT — SIGNIFICANT CHANGE UP
HYPOSEGMENTATION: PRESENT — SIGNIFICANT CHANGE UP
KETONES UR-MCNC: NEGATIVE MG/DL — SIGNIFICANT CHANGE UP
KETONES UR-MCNC: NEGATIVE MG/DL — SIGNIFICANT CHANGE UP
LACTATE BLDV-MCNC: >17 MMOL/L — CRITICAL HIGH (ref 0.5–2)
LACTATE BLDV-MCNC: >17 MMOL/L — CRITICAL HIGH (ref 0.5–2)
LEUKOCYTE ESTERASE UR-ACNC: NEGATIVE — SIGNIFICANT CHANGE UP
LEUKOCYTE ESTERASE UR-ACNC: NEGATIVE — SIGNIFICANT CHANGE UP
LG PLATELETS BLD QL AUTO: SLIGHT — SIGNIFICANT CHANGE UP
LG PLATELETS BLD QL AUTO: SLIGHT — SIGNIFICANT CHANGE UP
LIDOCAIN IGE QN: 28 U/L — SIGNIFICANT CHANGE UP (ref 16–77)
LIDOCAIN IGE QN: 28 U/L — SIGNIFICANT CHANGE UP (ref 16–77)
LYMPHOCYTES # BLD AUTO: 0.72 K/UL — LOW (ref 1–3.3)
LYMPHOCYTES # BLD AUTO: 0.72 K/UL — LOW (ref 1–3.3)
LYMPHOCYTES # BLD AUTO: 16 % — SIGNIFICANT CHANGE UP (ref 13–44)
LYMPHOCYTES # BLD AUTO: 16 % — SIGNIFICANT CHANGE UP (ref 13–44)
MACROCYTES BLD QL: SIGNIFICANT CHANGE UP
MACROCYTES BLD QL: SIGNIFICANT CHANGE UP
MAGNESIUM SERPL-MCNC: 3.4 MG/DL — HIGH (ref 1.6–2.6)
MAGNESIUM SERPL-MCNC: 3.4 MG/DL — HIGH (ref 1.6–2.6)
MANUAL SMEAR VERIFICATION: SIGNIFICANT CHANGE UP
MANUAL SMEAR VERIFICATION: SIGNIFICANT CHANGE UP
MCHC RBC-ENTMCNC: 28.4 GM/DL — LOW (ref 32–36)
MCHC RBC-ENTMCNC: 28.4 GM/DL — LOW (ref 32–36)
MCHC RBC-ENTMCNC: 29.7 PG — SIGNIFICANT CHANGE UP (ref 27–34)
MCHC RBC-ENTMCNC: 29.7 PG — SIGNIFICANT CHANGE UP (ref 27–34)
MCV RBC AUTO: 104.6 FL — HIGH (ref 80–100)
MCV RBC AUTO: 104.6 FL — HIGH (ref 80–100)
METAMYELOCYTES # FLD: 6 % — HIGH (ref 0–0)
METAMYELOCYTES # FLD: 6 % — HIGH (ref 0–0)
MONOCYTES # BLD AUTO: 0.32 K/UL — SIGNIFICANT CHANGE UP (ref 0–0.9)
MONOCYTES # BLD AUTO: 0.32 K/UL — SIGNIFICANT CHANGE UP (ref 0–0.9)
MONOCYTES NFR BLD AUTO: 7 % — SIGNIFICANT CHANGE UP (ref 2–14)
MONOCYTES NFR BLD AUTO: 7 % — SIGNIFICANT CHANGE UP (ref 2–14)
MYELOCYTES NFR BLD: 2 % — HIGH (ref 0–0)
MYELOCYTES NFR BLD: 2 % — HIGH (ref 0–0)
NEUTROPHILS # BLD AUTO: 3.02 K/UL — SIGNIFICANT CHANGE UP (ref 1.8–7.4)
NEUTROPHILS # BLD AUTO: 3.02 K/UL — SIGNIFICANT CHANGE UP (ref 1.8–7.4)
NEUTROPHILS NFR BLD AUTO: 50 % — SIGNIFICANT CHANGE UP (ref 43–77)
NEUTROPHILS NFR BLD AUTO: 50 % — SIGNIFICANT CHANGE UP (ref 43–77)
NEUTS BAND # BLD: 17 % — HIGH (ref 0–8)
NEUTS BAND # BLD: 17 % — HIGH (ref 0–8)
NITRITE UR-MCNC: NEGATIVE — SIGNIFICANT CHANGE UP
NITRITE UR-MCNC: NEGATIVE — SIGNIFICANT CHANGE UP
NRBC # BLD: 0 /100 — SIGNIFICANT CHANGE UP (ref 0–0)
NRBC # BLD: 0 /100 — SIGNIFICANT CHANGE UP (ref 0–0)
NRBC # BLD: SIGNIFICANT CHANGE UP /100 WBCS (ref 0–0)
NRBC # BLD: SIGNIFICANT CHANGE UP /100 WBCS (ref 0–0)
NT-PROBNP SERPL-SCNC: 5610 PG/ML — HIGH
NT-PROBNP SERPL-SCNC: 5610 PG/ML — HIGH
OB PNL STL: NEGATIVE — SIGNIFICANT CHANGE UP
OB PNL STL: NEGATIVE — SIGNIFICANT CHANGE UP
PCO2 BLDV: 123 MMHG — HIGH (ref 42–55)
PCO2 BLDV: 123 MMHG — HIGH (ref 42–55)
PCO2 BLDV: 87 MMHG — HIGH (ref 42–55)
PCO2 BLDV: 87 MMHG — HIGH (ref 42–55)
PH BLDV: <6.93 — LOW (ref 7.32–7.43)
PH UR: 5.5 — SIGNIFICANT CHANGE UP (ref 5–8)
PH UR: 5.5 — SIGNIFICANT CHANGE UP (ref 5–8)
PLAT MORPH BLD: NORMAL — SIGNIFICANT CHANGE UP
PLAT MORPH BLD: NORMAL — SIGNIFICANT CHANGE UP
PLATELET # BLD AUTO: 115 K/UL — LOW (ref 150–400)
PLATELET # BLD AUTO: 115 K/UL — LOW (ref 150–400)
PLATELET COUNT - ESTIMATE: NORMAL — SIGNIFICANT CHANGE UP
PLATELET COUNT - ESTIMATE: NORMAL — SIGNIFICANT CHANGE UP
PO2 BLDV: 71 MMHG — HIGH (ref 25–45)
PO2 BLDV: 71 MMHG — HIGH (ref 25–45)
PO2 BLDV: 73 MMHG — HIGH (ref 25–45)
PO2 BLDV: 73 MMHG — HIGH (ref 25–45)
POTASSIUM SERPL-MCNC: 5.7 MMOL/L — HIGH (ref 3.5–5.3)
POTASSIUM SERPL-MCNC: 5.7 MMOL/L — HIGH (ref 3.5–5.3)
POTASSIUM SERPL-SCNC: 5.7 MMOL/L — HIGH (ref 3.5–5.3)
POTASSIUM SERPL-SCNC: 5.7 MMOL/L — HIGH (ref 3.5–5.3)
PROT SERPL-MCNC: 6.9 G/DL — SIGNIFICANT CHANGE UP (ref 6.4–8.2)
PROT SERPL-MCNC: 6.9 G/DL — SIGNIFICANT CHANGE UP (ref 6.4–8.2)
PROT UR-MCNC: 100 MG/DL
PROT UR-MCNC: 100 MG/DL
RBC # BLD: 4.54 M/UL — SIGNIFICANT CHANGE UP (ref 4.2–5.8)
RBC # BLD: 4.54 M/UL — SIGNIFICANT CHANGE UP (ref 4.2–5.8)
RBC # FLD: 14.6 % — HIGH (ref 10.3–14.5)
RBC # FLD: 14.6 % — HIGH (ref 10.3–14.5)
RBC BLD AUTO: ABNORMAL
RBC BLD AUTO: ABNORMAL
RBC CASTS # UR COMP ASSIST: 6 /HPF — SIGNIFICANT CHANGE UP (ref 0–4)
RBC CASTS # UR COMP ASSIST: 6 /HPF — SIGNIFICANT CHANGE UP (ref 0–4)
RSV RNA NPH QL NAA+NON-PROBE: SIGNIFICANT CHANGE UP
RSV RNA NPH QL NAA+NON-PROBE: SIGNIFICANT CHANGE UP
SAO2 % BLDV: 76.9 % — SIGNIFICANT CHANGE UP (ref 67–88)
SAO2 % BLDV: 76.9 % — SIGNIFICANT CHANGE UP (ref 67–88)
SAO2 % BLDV: 83.9 % — SIGNIFICANT CHANGE UP (ref 67–88)
SAO2 % BLDV: 83.9 % — SIGNIFICANT CHANGE UP (ref 67–88)
SARS-COV-2 RNA SPEC QL NAA+PROBE: SIGNIFICANT CHANGE UP
SARS-COV-2 RNA SPEC QL NAA+PROBE: SIGNIFICANT CHANGE UP
SODIUM SERPL-SCNC: 129 MMOL/L — LOW (ref 132–145)
SODIUM SERPL-SCNC: 129 MMOL/L — LOW (ref 132–145)
SP GR SPEC: 1.02 — SIGNIFICANT CHANGE UP (ref 1–1.03)
SP GR SPEC: 1.02 — SIGNIFICANT CHANGE UP (ref 1–1.03)
TROPONIN I, HIGH SENSITIVITY RESULT: 66.7 NG/L — SIGNIFICANT CHANGE UP
TROPONIN I, HIGH SENSITIVITY RESULT: 66.7 NG/L — SIGNIFICANT CHANGE UP
UROBILINOGEN FLD QL: 1 MG/DL — SIGNIFICANT CHANGE UP (ref 0.2–1)
UROBILINOGEN FLD QL: 1 MG/DL — SIGNIFICANT CHANGE UP (ref 0.2–1)
VARIANT LYMPHS # BLD: 2 % — SIGNIFICANT CHANGE UP (ref 0–6)
VARIANT LYMPHS # BLD: 2 % — SIGNIFICANT CHANGE UP (ref 0–6)
WBC # BLD: 4.51 K/UL — SIGNIFICANT CHANGE UP (ref 3.8–10.5)
WBC # BLD: 4.51 K/UL — SIGNIFICANT CHANGE UP (ref 3.8–10.5)
WBC # FLD AUTO: 4.51 K/UL — SIGNIFICANT CHANGE UP (ref 3.8–10.5)
WBC # FLD AUTO: 4.51 K/UL — SIGNIFICANT CHANGE UP (ref 3.8–10.5)
WBC UR QL: 1 /HPF — SIGNIFICANT CHANGE UP (ref 0–5)
WBC UR QL: 1 /HPF — SIGNIFICANT CHANGE UP (ref 0–5)

## 2023-12-18 PROCEDURE — 99291 CRITICAL CARE FIRST HOUR: CPT

## 2023-12-18 PROCEDURE — 71045 X-RAY EXAM CHEST 1 VIEW: CPT | Mod: 26

## 2023-12-18 RX ORDER — NOREPINEPHRINE BITARTRATE/D5W 8 MG/250ML
0.1 PLASTIC BAG, INJECTION (ML) INTRAVENOUS
Qty: 8 | Refills: 0 | Status: DISCONTINUED | OUTPATIENT
Start: 2023-12-18 | End: 2023-12-22

## 2023-12-18 RX ORDER — SODIUM BICARBONATE 1 MEQ/ML
50 SYRINGE (ML) INTRAVENOUS ONCE
Refills: 0 | Status: COMPLETED | OUTPATIENT
Start: 2023-12-18 | End: 2023-12-18

## 2023-12-18 RX ORDER — PIPERACILLIN AND TAZOBACTAM 4; .5 G/20ML; G/20ML
3.38 INJECTION, POWDER, LYOPHILIZED, FOR SOLUTION INTRAVENOUS ONCE
Refills: 0 | Status: COMPLETED | OUTPATIENT
Start: 2023-12-18 | End: 2023-12-18

## 2023-12-18 RX ORDER — SODIUM CHLORIDE 9 MG/ML
1000 INJECTION INTRAMUSCULAR; INTRAVENOUS; SUBCUTANEOUS ONCE
Refills: 0 | Status: COMPLETED | OUTPATIENT
Start: 2023-12-18 | End: 2023-12-18

## 2023-12-18 RX ORDER — VANCOMYCIN HCL 1 G
1000 VIAL (EA) INTRAVENOUS ONCE
Refills: 0 | Status: DISCONTINUED | OUTPATIENT
Start: 2023-12-18 | End: 2023-12-22

## 2023-12-18 RX ADMIN — Medication 50 MILLIEQUIVALENT(S): at 13:09

## 2023-12-18 RX ADMIN — Medication 50 MILLIEQUIVALENT(S): at 13:42

## 2023-12-18 RX ADMIN — SODIUM CHLORIDE 1000 MILLILITER(S): 9 INJECTION INTRAMUSCULAR; INTRAVENOUS; SUBCUTANEOUS at 13:43

## 2023-12-18 RX ADMIN — Medication 8.44 MICROGRAM(S)/KG/MIN: at 13:29

## 2023-12-18 RX ADMIN — PIPERACILLIN AND TAZOBACTAM 200 GRAM(S): 4; .5 INJECTION, POWDER, LYOPHILIZED, FOR SOLUTION INTRAVENOUS at 13:43

## 2023-12-18 NOTE — ED PROVIDER NOTE - OBJECTIVE STATEMENT
77 y/o M with Hx of COPD not on home O2, brought in by EMS s/p cardiac arrest. Wife states that over the past few days patient has been short of breath and not sleeping well due to shortness of breath. No fever or chills. Patient has been coughing but no productive cough. This morning patient woke up and wasn't feeling well so he went to rest. Wife then noticed he was more quiet than usual when resting and was unable to wake him so she called 911. Patient was in asystolic arrest on EMS arrival and was given 3 rounds of EPI and intubation before achieving ROSC. 79 y/o M with Hx of COPD not on home O2, brought in by EMS s/p cardiac arrest. Wife states that over the past few days patient has been short of breath and not sleeping well due to shortness of breath. No fever or chills. Patient has been coughing but no productive cough. This morning patient woke up and wasn't feeling well so he went to rest. Wife then noticed he was more quiet than usual when resting and was unable to wake him so she called 911. Patient was in asystolic arrest on EMS arrival and was given 3 rounds of EPI and intubation before achieving ROSC.

## 2023-12-18 NOTE — ED ADULT TRIAGE NOTE - CHIEF COMPLAINT QUOTE
BIBA  due to cardiac arrest. in astystole for 25 minutes/ 3 epi given in field. no shocks.  in field.

## 2023-12-18 NOTE — ED PROVIDER NOTE - PHYSICAL EXAMINATION
CONST: unresponsive  EYES: fixed and dilated pupils  SKIN: cool to touch  HEENT: ETT present, intubated.   HEAD: No signs of head trauma.   RESP: Equal breath sounds with BVM  GI: No distention or tenderness  MSK: No deformities  NEURO: Unresponsive

## 2023-12-18 NOTE — ED PROVIDER NOTE - CRITICAL CARE ATTENDING CONTRIBUTION TO CARE
The patient was seen immediately upon arrival due to a high probability of imminent or life-threatening deterioration secondary to cardiac arrest, which required my direct attention, intervention, and personal management at the bedside. I have personally provided critical care time exclusive of time spent on separately billable procedures. Time includes review of laboratory data, radiology results, discussion with consultants, discussion with the patient's family, and monitoring for potential decompensation.

## 2023-12-18 NOTE — ED ADULT NURSE NOTE - OBJECTIVE STATEMENT
pt bib ems s/p cardiac arrest with ROSC, wife states pt has been SOB and fatigued over last few days, not sleeping well  with +cough but denies fever/chills. pt went to lie down today and became unresponsive. pt down for 40 min prior to ROSC with +intubation with EMS. cold to the touch, pupils unresponsive, lung sounds CTA.

## 2023-12-18 NOTE — ED ADULT NURSE REASSESSMENT NOTE - NS ED NURSE REASSESS COMMENT FT1
et tube placement confirmed with xr, unk tube size (inserted by EMS). OG tube 16, 24 @ the lip. pt placed on vent, RR 20 tidal volume 420 fiO2 100 peep 5.

## 2023-12-18 NOTE — ED PROVIDER NOTE - CLINICAL SUMMARY MEDICAL DECISION MAKING FREE TEXT BOX
Patient with COPD with recent shortness of breath. Found in cardiac arrest, now with palpable pulses after Epi x3. Suspect respiratory arrest. Labs sent, empiric broad spectrum antibiotics started, Levophed started for blood pressure support. Emergent transfer to .

## 2024-02-18 NOTE — PATIENT PROFILE ADULT - HAS THE PATIENT BEEN ADMITTED FROM SHORT TERM REHAB?
[facility]                                                                       Query Response Note      PATIENT:               JUNAID NAVARRETE  ACCT #:                  [id number]  MRN:                     [id number]  :                      1962  ADMIT DATE:       2024 4:43 PM  DISCH DATE:        2024 2:30 PM  RESPONDING  PROVIDER #:        [id number]           QUERY TEXT:    There is conflicting documentation in the medical record.      Osteomyelitis is documented under the discharge summary diagnosis.      Negative for osteomyelitis is documented in discharge summary.    Based on treatment, clinical findings and risk factors, can this documentation be further clarified?    The patient's Clinical Indicators include:  Clinical indicators:    This is a 61 year old male with diabetic foot infection. There is conflicting documentation for osteomylitis being ruled in or ruled out.    Treatment and monitoring:  Xray concerning for possible osteomyelitis  S/p vancomycin and Unasyn in ED  -Admit to medicine  -MRI with contrast  -Wound care and limb preservation consult placed    Risk factors:  Diabetes, cellulitis, Hx smoking     information:  [email or web address]  Options provided:   -- Osteomyelitis ruled in   -- Osteomyelitis ruled out   -- Unable to determine      Query created by: Desire Schmitt on 2024 8:56 AM    RESPONSE TEXT:    Osteomyelitis ruled out          Electronically signed by:  VELASQUEZ JOY MD 2024 1:56 PM              
no

## 2024-11-04 NOTE — PHYSICAL THERAPY INITIAL EVALUATION ADULT - HEALTH SCREEN CRITERIA
yes
[email or web address]

## 2025-02-24 NOTE — PROGRESS NOTE ADULT - I WAS PHYSICALLY PRESENT FOR THE KEY PORTIONS OF THE EVALUATION AND MANAGEMENT (E/M) SERVICE PROVIDED.  I AGREE WITH THE ABOVE HISTORY, PHYSICAL, AND PLAN WHICH I HAVE REVIEWED AND EDITED WHERE APPROPRIATE
[facility] PODIATRY [address]  [address]  [place] [id number]  Dept: [phone]  Dept Fax: [phone]     PAIN PROGRESS NOTE  Date of patient's visit: 2/24/2025  Patient's Name:  Jonny Westbrook YOB: 1974            Patient Care Team:  Evita Mirza APRN - CNP as PCP - General (Nurse Practitioner)  Evita Mirza APRN - CNP as PCP - Empaneled Provider  Georgiana Fagan DPM as Physician (Podiatry)  Tramaine Mckeon MD as Consulting Physician (Urology)  Pietro Navarro PA-C as Physician Assistant (Dermatology)      Chief Complaint   Patient presents with    Nail Problem     Toenail trim    Foot Pain     Bilateral foot       Subjective:   This Jonny Westbrook comes to clinic for foot and nail care.  Pt currently has complaint of thickened, painful, elongated nails that he/she cannot manage by themselves.  Pt. Relates pain to nails with shoe gear.  Pt's primary care physician is Evita Mirza APRN - CNP last seen 1/3/25.    Pt has a new complaint of increased swelling and dry skin to michael feet..    Past Medical History:   Diagnosis Date    Arthritis     Caffeine use     2-3 cups coffee and 2-3 cups tea/day    Depression     Hypertriglyceridemia     Obesity     Schizophrenia (HCC)        Allergies   Allergen Reactions    Codeine Other (See Comments)     hallucinates      Current Outpatient Medications on File Prior to Visit   Medication Sig Dispense Refill    terbinafine (LAMISIL) 250 MG tablet       triamcinolone (KENALOG) 0.1 % cream Apply to affected areas twice daily, as needed, for itching (not face, armpit or groin). 454 g 2    Nutritional Supplements (ENSURE NUTRITION SHAKE) LIQD Take 2 Bottles by mouth daily (NUTRITIONAL DEFICIENCY) CHOCOLATE ONLY 30 each 3    Multiple Vitamin (MULTIVITAMIN) tablet TAKE 1 TAB BY MOUTH ONCE EVERY DAY 30 tablet 4    vitamin C (ASCORBIC ACID) 500 MG tablet TAKE 2 TABS BY MOUTH ONCE EVERY DAY 60 
Statement Selected